# Patient Record
Sex: FEMALE | Race: WHITE | Employment: OTHER | URBAN - METROPOLITAN AREA
[De-identification: names, ages, dates, MRNs, and addresses within clinical notes are randomized per-mention and may not be internally consistent; named-entity substitution may affect disease eponyms.]

---

## 2017-01-23 ENCOUNTER — TELEPHONE (OUTPATIENT)
Dept: FAMILY MEDICINE CLINIC | Facility: CLINIC | Age: 67
End: 2017-01-23

## 2017-01-23 NOTE — TELEPHONE ENCOUNTER
Patient states after looking more closely at her CPaP machine, she noticed that she does have the SD card. States the machine is not reading it so she contacted [facility] as advised by JAYY and they told her to go to [place] and buy an SD card.   Patient wonde

## 2017-01-23 NOTE — TELEPHONE ENCOUNTER
Patient states she has a CPaP machine and has been traveling a lot. States last night when she plugged in her machine it said there was no SD card. Patient states she looked to see and there was not an SD card.   Patient states she has no idea where she c

## 2017-02-12 DIAGNOSIS — I10 ESSENTIAL HYPERTENSION: Primary | ICD-10-CM

## 2017-02-12 DIAGNOSIS — E03.9 ACQUIRED HYPOTHYROIDISM: ICD-10-CM

## 2017-02-12 PROBLEM — J30.9 ALLERGIC RHINITIS: Status: ACTIVE | Noted: 2017-02-12

## 2017-02-12 PROBLEM — D64.9 ANEMIA: Status: ACTIVE | Noted: 2017-01-13

## 2017-02-12 RX ORDER — LEVOTHYROXINE SODIUM 0.05 MG/1
1 TABLET ORAL
COMMUNITY
Start: 2013-03-19 | End: 2017-11-18

## 2017-02-12 RX ORDER — CANDESARTAN CILEXETIL AND HYDROCHLOROTHIAZIDE 16; 12.5 MG/1; MG/1
1 TABLET ORAL DAILY
COMMUNITY
Start: 2006-11-08 | End: 2017-08-25 | Stop reason: ALTCHOICE

## 2017-02-12 RX ORDER — INDOMETHACIN 25 MG/1
1 CAPSULE ORAL 3 TIMES DAILY PRN
COMMUNITY
Start: 2013-10-11 | End: 2017-05-23

## 2017-02-13 ENCOUNTER — LAB ENCOUNTER (OUTPATIENT)
Dept: LAB | Age: 67
End: 2017-02-13
Attending: FAMILY MEDICINE
Payer: MEDICARE

## 2017-02-13 DIAGNOSIS — E03.9 ACQUIRED HYPOTHYROIDISM: ICD-10-CM

## 2017-02-13 DIAGNOSIS — K85.90 ACUTE PANCREATITIS: Primary | ICD-10-CM

## 2017-02-13 DIAGNOSIS — D64.9 ANEMIA: ICD-10-CM

## 2017-02-13 DIAGNOSIS — I10 ESSENTIAL HYPERTENSION: ICD-10-CM

## 2017-02-13 LAB
ALBUMIN SERPL-MCNC: 3.7 G/DL (ref 3.5–4.8)
ALP LIVER SERPL-CCNC: 72 U/L (ref 55–142)
ALT SERPL-CCNC: 19 U/L (ref 14–54)
AMYLASE: 137 U/L (ref 25–115)
AST SERPL-CCNC: 11 U/L (ref 15–41)
BASOPHILS # BLD AUTO: 0.05 X10(3) UL (ref 0–0.1)
BASOPHILS NFR BLD AUTO: 0.4 %
BILIRUB SERPL-MCNC: 0.6 MG/DL (ref 0.1–2)
BUN BLD-MCNC: 40 MG/DL (ref 8–20)
CALCIUM BLD-MCNC: 8.9 MG/DL (ref 8.3–10.3)
CHLORIDE: 100 MMOL/L (ref 101–111)
CHOLEST SMN-MCNC: 195 MG/DL (ref ?–200)
CO2: 29 MMOL/L (ref 22–32)
CREAT BLD-MCNC: 1.57 MG/DL (ref 0.55–1.02)
EOSINOPHIL # BLD AUTO: 0.11 X10(3) UL (ref 0–0.3)
EOSINOPHIL NFR BLD AUTO: 0.9 %
ERYTHROCYTE [DISTWIDTH] IN BLOOD BY AUTOMATED COUNT: 15.1 % (ref 11.5–16)
GLUCOSE BLD-MCNC: 108 MG/DL (ref 70–99)
HAV AB SERPL IA-ACNC: 254 PG/ML (ref 193–986)
HCT VFR BLD AUTO: 33.6 % (ref 34–50)
HDLC SERPL-MCNC: 75 MG/DL (ref 45–?)
HDLC SERPL: 2.6 {RATIO} (ref ?–4.44)
HGB BLD-MCNC: 10.7 G/DL (ref 12–16)
IMMATURE GRANULOCYTE COUNT: 0.12 X10(3) UL (ref 0–1)
IMMATURE GRANULOCYTE RATIO %: 1 %
LDLC SERPL CALC-MCNC: 98 MG/DL (ref ?–130)
LIPASE: 421 U/L (ref 73–393)
LYMPHOCYTES # BLD AUTO: 1.46 X10(3) UL (ref 0.9–4)
LYMPHOCYTES NFR BLD AUTO: 11.7 %
M PROTEIN MFR SERPL ELPH: 8.1 G/DL (ref 6.1–8.3)
MCH RBC QN AUTO: 30 PG (ref 27–33.2)
MCHC RBC AUTO-ENTMCNC: 31.8 G/DL (ref 31–37)
MCV RBC AUTO: 94.1 FL (ref 81–100)
MONOCYTES # BLD AUTO: 1.23 X10(3) UL (ref 0.1–0.6)
MONOCYTES NFR BLD AUTO: 9.9 %
NEUTROPHIL ABS PRELIM: 9.46 X10 (3) UL (ref 1.3–6.7)
NEUTROPHILS # BLD AUTO: 9.46 X10(3) UL (ref 1.3–6.7)
NEUTROPHILS NFR BLD AUTO: 76.1 %
NONHDLC SERPL-MCNC: 120 MG/DL (ref ?–130)
PLATELET # BLD AUTO: 224 10(3)UL (ref 150–450)
POTASSIUM SERPL-SCNC: 4.1 MMOL/L (ref 3.6–5.1)
RBC # BLD AUTO: 3.57 X10(6)UL (ref 3.8–5.1)
RED CELL DISTRIBUTION WIDTH-SD: 52.1 FL (ref 35.1–46.3)
SODIUM SERPL-SCNC: 136 MMOL/L (ref 136–144)
TRIGLYCERIDES: 110 MG/DL (ref ?–150)
TSI SER-ACNC: 1.88 MIU/ML (ref 0.35–5.5)
VLDL: 22 MG/DL (ref 5–40)
WBC # BLD AUTO: 12.4 X10(3) UL (ref 4–13)

## 2017-02-13 PROCEDURE — 80053 COMPREHEN METABOLIC PANEL: CPT

## 2017-02-13 PROCEDURE — 85025 COMPLETE CBC W/AUTO DIFF WBC: CPT

## 2017-02-13 PROCEDURE — 80061 LIPID PANEL: CPT

## 2017-02-13 PROCEDURE — 82607 VITAMIN B-12: CPT

## 2017-02-13 PROCEDURE — 84443 ASSAY THYROID STIM HORMONE: CPT

## 2017-02-13 PROCEDURE — 83690 ASSAY OF LIPASE: CPT

## 2017-02-13 PROCEDURE — 82150 ASSAY OF AMYLASE: CPT

## 2017-02-21 ENCOUNTER — TELEPHONE (OUTPATIENT)
Dept: FAMILY MEDICINE CLINIC | Facility: CLINIC | Age: 67
End: 2017-02-21

## 2017-02-22 ENCOUNTER — OFFICE VISIT (OUTPATIENT)
Dept: FAMILY MEDICINE CLINIC | Facility: CLINIC | Age: 67
End: 2017-02-22

## 2017-02-22 VITALS
RESPIRATION RATE: 16 BRPM | HEIGHT: 60.5 IN | WEIGHT: 175 LBS | TEMPERATURE: 99 F | SYSTOLIC BLOOD PRESSURE: 118 MMHG | HEART RATE: 92 BPM | DIASTOLIC BLOOD PRESSURE: 78 MMHG | BODY MASS INDEX: 33.47 KG/M2 | OXYGEN SATURATION: 97 %

## 2017-02-22 DIAGNOSIS — K85.80 OTHER ACUTE PANCREATITIS, UNSPECIFIED COMPLICATION STATUS: ICD-10-CM

## 2017-02-22 DIAGNOSIS — D64.9 ANEMIA, UNSPECIFIED TYPE: Primary | ICD-10-CM

## 2017-02-22 DIAGNOSIS — M1A.0790 IDIOPATHIC CHRONIC GOUT OF FOOT WITHOUT TOPHUS, UNSPECIFIED LATERALITY: ICD-10-CM

## 2017-02-22 PROCEDURE — 99215 OFFICE O/P EST HI 40 MIN: CPT | Performed by: FAMILY MEDICINE

## 2017-02-23 NOTE — PATIENT INSTRUCTIONS
Continue with good hydration. Stop indocin. Will plan for referral to GI specialist.     Radha Gather report and CD with copies from CT and MRI and ultrasound from [address]  - ask for medical records.

## 2017-02-23 NOTE — PROGRESS NOTES
Chief Complaint:   Patient presents with: Follow - Up: discuss lab results      HPI:   This is a 77year old female coming in for follow-up of her lab testing.   Patient has had laboratory findings that include anemia, elevated amylase lipase, renal insuff HDL Cholesterol 75 >45 mg/dL   LDL Cholesterol 98 <130 mg/dL   VLDL 22 5-40 mg/dL   Chol/HDL Ratio 2.60 <4.44   Non HDL Chol 120 <130 mg/dL   -TSH W REFLEX TO FREE T4   Result Value Ref Range   TSH 1.880 0.350-5.500 mIU/mL   -CBC W/ DIFFERENTIAL   Result REVIEW OF SYSTEMS:   CONSTITUTIONAL: see HPI,  Denies , fever, chills,    EENT:  Eyes:  Denies eye pain, visual changes,   Ears, Nose, Throat:  Denies hearing loss,or disturbance. Denies sore throat  INTEGUMENTARY:  Denies rashes, itching.     Arriaza Beath hepatosplenomegaly. BACK: No tenderness, no spasm, SLR test negative, FROM. EXTREMITIES:  No edema, no cyanosis,FROM, 2+ dorsalis pedis pulses bilaterally.   NEURO:  No deficit, normal gait, strength and tone, sensory intact,   PSYCH: no depression or a

## 2017-03-01 ENCOUNTER — TELEPHONE (OUTPATIENT)
Dept: FAMILY MEDICINE CLINIC | Facility: CLINIC | Age: 67
End: 2017-03-01

## 2017-03-01 NOTE — TELEPHONE ENCOUNTER
Patient states that she is having gout in her left ankle and she states that Dr. Johan Briggs did not want her to take the indomethacin and if she would have gout attack again he was going to have a plan B. Please advise.

## 2017-03-13 ENCOUNTER — HOSPITAL ENCOUNTER (OUTPATIENT)
Dept: MRI IMAGING | Facility: HOSPITAL | Age: 67
Discharge: HOME | End: 2017-03-13
Attending: INTERNAL MEDICINE
Payer: MEDICARE

## 2017-03-13 DIAGNOSIS — K85.90 ACUTE PANCREATITIS, UNSPECIFIED COMPLICATION STATUS, UNSPECIFIED PANCREATITIS TYPE: ICD-10-CM

## 2017-03-13 DIAGNOSIS — D64.89 ANEMIA DUE TO OTHER CAUSE, NOT CLASSIFIED: ICD-10-CM

## 2017-03-13 PROCEDURE — 76376 3D RENDER W/INTRP POSTPROCES: CPT

## 2017-03-13 PROCEDURE — A9575 INJ GADOTERATE MEGLUMI 0.1ML: HCPCS | Performed by: INTERNAL MEDICINE

## 2017-03-13 PROCEDURE — 74183 MRI ABD W/O CNTR FLWD CNTR: CPT

## 2017-04-21 ENCOUNTER — HOSPITAL ENCOUNTER (OUTPATIENT)
Dept: MRI IMAGING | Facility: HOSPITAL | Age: 67
Discharge: HOME | End: 2017-04-21
Attending: INTERNAL MEDICINE
Payer: MEDICARE

## 2017-04-21 DIAGNOSIS — D50.9 IRON DEFICIENCY ANEMIA, UNSPECIFIED IRON DEFICIENCY ANEMIA TYPE: ICD-10-CM

## 2017-04-21 PROCEDURE — 74183 MRI ABD W/O CNTR FLWD CNTR: CPT

## 2017-04-21 PROCEDURE — 72197 MRI PELVIS W/O & W/DYE: CPT

## 2017-04-21 PROCEDURE — A9575 INJ GADOTERATE MEGLUMI 0.1ML: HCPCS | Performed by: INTERNAL MEDICINE

## 2017-04-24 ENCOUNTER — OFFICE VISIT (OUTPATIENT)
Dept: FAMILY MEDICINE CLINIC | Facility: CLINIC | Age: 67
End: 2017-04-24

## 2017-04-24 ENCOUNTER — LAB ENCOUNTER (OUTPATIENT)
Dept: LAB | Age: 67
End: 2017-04-24
Attending: FAMILY MEDICINE
Payer: MEDICARE

## 2017-04-24 VITALS
RESPIRATION RATE: 16 BRPM | HEART RATE: 94 BPM | SYSTOLIC BLOOD PRESSURE: 110 MMHG | DIASTOLIC BLOOD PRESSURE: 68 MMHG | TEMPERATURE: 99 F | BODY MASS INDEX: 34.16 KG/M2 | WEIGHT: 174 LBS | HEIGHT: 60 IN

## 2017-04-24 DIAGNOSIS — K85.90 ACUTE PANCREATITIS, UNSPECIFIED COMPLICATION STATUS, UNSPECIFIED PANCREATITIS TYPE: ICD-10-CM

## 2017-04-24 DIAGNOSIS — M10.9 GOUT: Primary | ICD-10-CM

## 2017-04-24 DIAGNOSIS — D64.89 ANEMIA DUE TO OTHER CAUSE, NOT CLASSIFIED: ICD-10-CM

## 2017-04-24 DIAGNOSIS — N18.2 CHRONIC RENAL INSUFFICIENCY, STAGE 2 (MILD): ICD-10-CM

## 2017-04-24 DIAGNOSIS — M1A.0790 IDIOPATHIC CHRONIC GOUT OF FOOT WITHOUT TOPHUS, UNSPECIFIED LATERALITY: Primary | ICD-10-CM

## 2017-04-24 DIAGNOSIS — D50.9 IRON DEFICIENCY ANEMIA, UNSPECIFIED IRON DEFICIENCY ANEMIA TYPE: ICD-10-CM

## 2017-04-24 PROCEDURE — 83550 IRON BINDING TEST: CPT

## 2017-04-24 PROCEDURE — 85025 COMPLETE CBC W/AUTO DIFF WBC: CPT

## 2017-04-24 PROCEDURE — 82607 VITAMIN B-12: CPT

## 2017-04-24 PROCEDURE — 82728 ASSAY OF FERRITIN: CPT

## 2017-04-24 PROCEDURE — 84550 ASSAY OF BLOOD/URIC ACID: CPT

## 2017-04-24 PROCEDURE — 83540 ASSAY OF IRON: CPT

## 2017-04-24 PROCEDURE — 80048 BASIC METABOLIC PNL TOTAL CA: CPT

## 2017-04-24 PROCEDURE — 99214 OFFICE O/P EST MOD 30 MIN: CPT | Performed by: FAMILY MEDICINE

## 2017-04-24 RX ORDER — CHOLECALCIFEROL (VITAMIN D3) 25 MCG
1 TABLET,CHEWABLE ORAL DAILY
COMMUNITY

## 2017-04-24 RX ORDER — FEBUXOSTAT 40 MG/1
40 TABLET, FILM COATED ORAL DAILY
Qty: 30 TABLET | Refills: 3 | Status: SHIPPED | OUTPATIENT
Start: 2017-04-24 | End: 2017-05-23

## 2017-04-24 NOTE — PROGRESS NOTES
[facility] [place]  PROGRESS NOTE  Chief Complaint:   Patient presents with:  Gout: here to discuss treatment plan      HPI:   This is a 77year old female coming in for follow-up of her gout.     Patient has had flareup recently she took to [address] Denies chest pain, chest pressure, chest discomfort, palpitations, edema, dyspnea on exertion or at rest.  RESPIRATORY:  Denies shortness of breath, wheezing, cough or sputum production.   GASTROINTESTINAL: SEE HPI   Denies abdominal pain, nausea, vomiting, tenderness, no spasm, SLR test negative, FROM. EXTREMITIES: tendernss at left great toe,    Mild edema, no cyanosis,   no clubbing, FROM, 2+ dorsalis pedis pulses bilaterally.     NEURO:  No deficit, normal gait, strength and tone, sensory intact, normal

## 2017-04-24 NOTE — PATIENT INSTRUCTIONS
Try to add uric acid levels to this morning's labs. Add uloric.    Await labs to determine if starting medication regularly

## 2017-05-23 ENCOUNTER — TELEPHONE (OUTPATIENT)
Dept: FAMILY MEDICINE CLINIC | Facility: CLINIC | Age: 67
End: 2017-05-23

## 2017-05-23 ENCOUNTER — OFFICE VISIT (OUTPATIENT)
Dept: FAMILY MEDICINE CLINIC | Facility: CLINIC | Age: 67
End: 2017-05-23

## 2017-05-23 VITALS
TEMPERATURE: 99 F | BODY MASS INDEX: 34 KG/M2 | WEIGHT: 175.13 LBS | DIASTOLIC BLOOD PRESSURE: 72 MMHG | SYSTOLIC BLOOD PRESSURE: 138 MMHG | HEART RATE: 92 BPM | RESPIRATION RATE: 16 BRPM

## 2017-05-23 DIAGNOSIS — M1A.0720 IDIOPATHIC CHRONIC GOUT OF LEFT ANKLE WITHOUT TOPHUS: Primary | ICD-10-CM

## 2017-05-23 DIAGNOSIS — N18.2 CHRONIC RENAL INSUFFICIENCY, STAGE 2 (MILD): ICD-10-CM

## 2017-05-23 PROCEDURE — 99214 OFFICE O/P EST MOD 30 MIN: CPT | Performed by: FAMILY MEDICINE

## 2017-05-23 RX ORDER — PREDNISONE 10 MG/1
TABLET ORAL
Qty: 30 TABLET | Refills: 0 | Status: SHIPPED | OUTPATIENT
Start: 2017-05-23 | End: 2017-07-26 | Stop reason: ALTCHOICE

## 2017-05-23 RX ORDER — PREDNISONE 10 MG/1
TABLET ORAL
Qty: 30 TABLET | Refills: 0 | Status: SHIPPED | OUTPATIENT
Start: 2017-05-23 | End: 2017-05-23

## 2017-05-23 NOTE — PATIENT INSTRUCTIONS
Continue with drinking plenty of water. Kidney function improved. Consider referral to rheumatology ( dr. Cheng Moore) if persisting flare ups. Obtain labs as directed by dr. Dayo Kolb. Recheck in 2 months.

## 2017-05-23 NOTE — PROGRESS NOTES
[address]  PROGRESS NOTE  Chief Complaint:   Patient presents with: Follow - Up: Per gastro, also would like to talk about medications and lab work      HPI:   This is a 77year old female coming in for follow up visit.    pt had evalut % 0.4 %       Past Medical History:    Macular degeneration  Thoracic DJD  DERMATITIS (ICD-692. 9)  HYPERTENSION (ICD-401. 9)  HEAD TRAUMA, CLOSED (ICD-959.01)  ALLERGIC RHINITIS (ICD-477. 9)  Gout - increase flares - 2016  back pain     Surgical History (rev headache, , dizziness, syncope, paralysis, ataxia,   HEMATOLOGIC:  Denies anemia, bleeding or bruising. LYMPHATICS:  Denies enlarged nodes or  PSYCHIATRIC:  Denies depression or anxiety.   ENDOCRINOLOGIC:  Denies cold or heat intolerance, polyuria or polyd Sig: Two tab bid for 3 days, One tab tid for 3 days, One tab bid for 3 days, One tab qd for 3 days. Patient Instructions   Continue with drinking plenty of water. Kidney function improved.      Consider referral to rheumatology ( dr. Dragan Walker

## 2017-06-14 ENCOUNTER — PATIENT OUTREACH (OUTPATIENT)
Dept: FAMILY MEDICINE CLINIC | Facility: CLINIC | Age: 67
End: 2017-06-14

## 2017-07-05 ENCOUNTER — APPOINTMENT (OUTPATIENT)
Dept: LAB | Age: 67
End: 2017-07-05
Attending: INTERNAL MEDICINE
Payer: MEDICARE

## 2017-07-05 DIAGNOSIS — M1A.0720 IDIOPATHIC CHRONIC GOUT OF LEFT ANKLE WITHOUT TOPHUS: ICD-10-CM

## 2017-07-05 DIAGNOSIS — D64.9 ANEMIA, UNSPECIFIED TYPE: ICD-10-CM

## 2017-07-05 LAB
BUN BLD-MCNC: 43 MG/DL (ref 8–20)
CALCIUM BLD-MCNC: 9.4 MG/DL (ref 8.3–10.3)
CHLORIDE: 105 MMOL/L (ref 101–111)
CO2: 25 MMOL/L (ref 22–32)
CREAT BLD-MCNC: 1.47 MG/DL (ref 0.55–1.02)
DEPRECATED HBV CORE AB SER IA-ACNC: 269.7 NG/ML (ref 10–291)
ERYTHROCYTE [DISTWIDTH] IN BLOOD BY AUTOMATED COUNT: 14.6 % (ref 11.5–16)
GLUCOSE BLD-MCNC: 94 MG/DL (ref 70–99)
HCT VFR BLD AUTO: 34.4 % (ref 34–50)
HGB BLD-MCNC: 10.9 G/DL (ref 12–16)
IRON SATURATION: 24 % (ref 13–45)
IRON: 88 UG/DL (ref 28–170)
MCH RBC QN AUTO: 29.4 PG (ref 27–33.2)
MCHC RBC AUTO-ENTMCNC: 31.7 G/DL (ref 31–37)
MCV RBC AUTO: 92.7 FL (ref 81–100)
PLATELET # BLD AUTO: 241 10(3)UL (ref 150–450)
POTASSIUM SERPL-SCNC: 3.8 MMOL/L (ref 3.6–5.1)
RBC # BLD AUTO: 3.71 X10(6)UL (ref 3.8–5.1)
RED CELL DISTRIBUTION WIDTH-SD: 49.7 FL (ref 35.1–46.3)
SODIUM SERPL-SCNC: 138 MMOL/L (ref 136–144)
TOTAL IRON BINDING CAPACITY: 371 UG/DL (ref 298–536)
TRANSFERRIN: 249 MG/DL (ref 200–360)
URIC ACID: 11.6 MG/DL (ref 2.4–8)
WBC # BLD AUTO: 7.5 X10(3) UL (ref 4–13)

## 2017-07-05 PROCEDURE — 80048 BASIC METABOLIC PNL TOTAL CA: CPT

## 2017-07-05 PROCEDURE — 84550 ASSAY OF BLOOD/URIC ACID: CPT

## 2017-07-05 PROCEDURE — 85027 COMPLETE CBC AUTOMATED: CPT

## 2017-07-05 PROCEDURE — 36415 COLL VENOUS BLD VENIPUNCTURE: CPT

## 2017-07-05 PROCEDURE — 82728 ASSAY OF FERRITIN: CPT

## 2017-07-05 PROCEDURE — 83550 IRON BINDING TEST: CPT

## 2017-07-05 PROCEDURE — 83540 ASSAY OF IRON: CPT

## 2017-07-26 ENCOUNTER — OFFICE VISIT (OUTPATIENT)
Dept: FAMILY MEDICINE CLINIC | Facility: CLINIC | Age: 67
End: 2017-07-26

## 2017-07-26 VITALS
TEMPERATURE: 99 F | SYSTOLIC BLOOD PRESSURE: 138 MMHG | HEART RATE: 88 BPM | BODY MASS INDEX: 33.57 KG/M2 | HEIGHT: 60.5 IN | RESPIRATION RATE: 16 BRPM | DIASTOLIC BLOOD PRESSURE: 82 MMHG | WEIGHT: 175.5 LBS

## 2017-07-26 DIAGNOSIS — N18.2 CHRONIC RENAL INSUFFICIENCY, STAGE 2 (MILD): ICD-10-CM

## 2017-07-26 DIAGNOSIS — M1A.0720 IDIOPATHIC CHRONIC GOUT OF LEFT ANKLE WITHOUT TOPHUS: Primary | ICD-10-CM

## 2017-07-26 PROBLEM — N18.30 CKD (CHRONIC KIDNEY DISEASE) STAGE 3, GFR 30-59 ML/MIN (HCC): Status: ACTIVE | Noted: 2017-07-26

## 2017-07-26 PROCEDURE — 99214 OFFICE O/P EST MOD 30 MIN: CPT | Performed by: FAMILY MEDICINE

## 2017-07-26 NOTE — PROGRESS NOTES
Chief Complaint:   Patient presents with: Follow - Up: Review lab work, patient also stated that after sitting by a pine tree patient has been breaking out in hives off and on      HPI:   This is a 79year old female coming in for follow-up care.   Patient (ICD-692. 9)  HYPERTENSION (ICD-401. 9)  HEAD TRAUMA, CLOSED (ICD-959.01)  ALLERGIC RHINITIS (ICD-477. 9)  Gout - increase flares - 2016  back pain      Surgical History (reviewed - no changes required):    foot surgery     FH:      Social History (reviewed - following:    Height as of this encounter: 60.5\". Weight as of this encounter: 175 lb 8 oz. Vital signs reviewed. Appears stated age, well groomed.     Physical Exam:    GEN:  Patient is alert, awake and oriented, well developed, well nourished  female vaccination     Vitreous degeneration     CKD (chronic kidney disease) stage 3, GFR 30-59 ml/min      Deonte Washington MD  7/26/2017  10:40 AM

## 2017-07-28 ENCOUNTER — TELEPHONE (OUTPATIENT)
Dept: FAMILY MEDICINE CLINIC | Facility: CLINIC | Age: 67
End: 2017-07-28

## 2017-07-28 NOTE — TELEPHONE ENCOUNTER
Patient informed that Dr. Beatris Patterson is out of the office and she will have to schedule with another provider. Patient states that she if fine with scheduling with another provider. Appointment made.

## 2017-08-15 ENCOUNTER — APPOINTMENT (OUTPATIENT)
Dept: LAB | Age: 67
End: 2017-08-15
Attending: FAMILY MEDICINE
Payer: MEDICARE

## 2017-08-15 ENCOUNTER — OFFICE VISIT (OUTPATIENT)
Dept: FAMILY MEDICINE CLINIC | Facility: CLINIC | Age: 67
End: 2017-08-15

## 2017-08-15 VITALS
HEART RATE: 80 BPM | TEMPERATURE: 99 F | WEIGHT: 168.25 LBS | DIASTOLIC BLOOD PRESSURE: 80 MMHG | RESPIRATION RATE: 12 BRPM | SYSTOLIC BLOOD PRESSURE: 130 MMHG | BODY MASS INDEX: 32 KG/M2

## 2017-08-15 DIAGNOSIS — M1A.0720 IDIOPATHIC CHRONIC GOUT OF LEFT ANKLE WITHOUT TOPHUS: ICD-10-CM

## 2017-08-15 DIAGNOSIS — N18.2 CHRONIC RENAL INSUFFICIENCY, STAGE 2 (MILD): ICD-10-CM

## 2017-08-15 DIAGNOSIS — M75.101 TEAR OF RIGHT ROTATOR CUFF, UNSPECIFIED TEAR EXTENT: ICD-10-CM

## 2017-08-15 DIAGNOSIS — Z01.818 PREOPERATIVE EXAMINATION: Primary | ICD-10-CM

## 2017-08-15 LAB
ALBUMIN SERPL-MCNC: 4.2 G/DL (ref 3.5–4.8)
ALP LIVER SERPL-CCNC: 75 U/L (ref 55–142)
ALT SERPL-CCNC: 19 U/L (ref 14–54)
AST SERPL-CCNC: 13 U/L (ref 15–41)
BASOPHILS # BLD AUTO: 0.04 X10(3) UL (ref 0–0.1)
BASOPHILS NFR BLD AUTO: 0.4 %
BILIRUB SERPL-MCNC: 0.5 MG/DL (ref 0.1–2)
BUN BLD-MCNC: 91 MG/DL (ref 8–20)
CALCIUM BLD-MCNC: 10.2 MG/DL (ref 8.3–10.3)
CHLORIDE: 87 MMOL/L (ref 101–111)
CO2: 22 MMOL/L (ref 22–32)
CREAT BLD-MCNC: 6.57 MG/DL (ref 0.55–1.02)
EOSINOPHIL # BLD AUTO: 0.08 X10(3) UL (ref 0–0.3)
EOSINOPHIL NFR BLD AUTO: 0.8 %
ERYTHROCYTE [DISTWIDTH] IN BLOOD BY AUTOMATED COUNT: 13.2 % (ref 11.5–16)
GLUCOSE BLD-MCNC: 102 MG/DL (ref 70–99)
HCT VFR BLD AUTO: 36.4 % (ref 34–50)
HGB BLD-MCNC: 12 G/DL (ref 12–16)
IMMATURE GRANULOCYTE COUNT: 0.07 X10(3) UL (ref 0–1)
IMMATURE GRANULOCYTE RATIO %: 0.7 %
LYMPHOCYTES # BLD AUTO: 1.3 X10(3) UL (ref 0.9–4)
LYMPHOCYTES NFR BLD AUTO: 13.6 %
M PROTEIN MFR SERPL ELPH: 9.4 G/DL (ref 6.1–8.3)
MCH RBC QN AUTO: 29.2 PG (ref 27–33.2)
MCHC RBC AUTO-ENTMCNC: 33 G/DL (ref 31–37)
MCV RBC AUTO: 88.6 FL (ref 81–100)
MONOCYTES # BLD AUTO: 0.86 X10(3) UL (ref 0.1–0.6)
MONOCYTES NFR BLD AUTO: 9 %
NEUTROPHIL ABS PRELIM: 7.21 X10 (3) UL (ref 1.3–6.7)
NEUTROPHILS # BLD AUTO: 7.21 X10(3) UL (ref 1.3–6.7)
NEUTROPHILS NFR BLD AUTO: 75.5 %
PLATELET # BLD AUTO: 368 10(3)UL (ref 150–450)
POTASSIUM SERPL-SCNC: 3.7 MMOL/L (ref 3.6–5.1)
RBC # BLD AUTO: 4.11 X10(6)UL (ref 3.8–5.1)
RED CELL DISTRIBUTION WIDTH-SD: 42.1 FL (ref 35.1–46.3)
SODIUM SERPL-SCNC: 127 MMOL/L (ref 136–144)
WBC # BLD AUTO: 9.6 X10(3) UL (ref 4–13)

## 2017-08-15 PROCEDURE — 36415 COLL VENOUS BLD VENIPUNCTURE: CPT | Performed by: FAMILY MEDICINE

## 2017-08-15 PROCEDURE — 85025 COMPLETE CBC W/AUTO DIFF WBC: CPT | Performed by: FAMILY MEDICINE

## 2017-08-15 PROCEDURE — 99215 OFFICE O/P EST HI 40 MIN: CPT | Performed by: FAMILY MEDICINE

## 2017-08-15 PROCEDURE — 80053 COMPREHEN METABOLIC PANEL: CPT | Performed by: FAMILY MEDICINE

## 2017-08-15 PROCEDURE — 93000 ELECTROCARDIOGRAM COMPLETE: CPT | Performed by: FAMILY MEDICINE

## 2017-08-15 RX ORDER — TRAMADOL HYDROCHLORIDE 50 MG/1
50 TABLET ORAL AS NEEDED
COMMUNITY
End: 2017-10-02

## 2017-08-15 RX ORDER — INDOMETHACIN 25 MG/1
25 CAPSULE ORAL
Qty: 21 CAPSULE | Refills: 1 | Status: SHIPPED | OUTPATIENT
Start: 2017-08-15 | End: 2017-10-02 | Stop reason: ALTCHOICE

## 2017-08-15 NOTE — PATIENT INSTRUCTIONS
Ok to use short course of indomethacin to settle down gout.     ekg - normal     Will do preop labs today     Will send office note over to dr. Izabel Gonzalez - medically stable for surgery

## 2017-08-16 NOTE — PROGRESS NOTES
ADDENDUM:     Laboratory results returned - Creatinine level quit elevated and sodium level is low.      Patient instructed to go to Emergency room.   (patient took one dose of indomethacin last evening - instructed to not take any further doses)     Emerge

## 2017-08-21 ENCOUNTER — TELEPHONE (OUTPATIENT)
Dept: FAMILY MEDICINE CLINIC | Facility: CLINIC | Age: 67
End: 2017-08-21

## 2017-08-21 NOTE — TELEPHONE ENCOUNTER
Would like to see  today was discharged from hospital, but nothing open. Please give her a call.   Ty

## 2017-08-21 NOTE — TELEPHONE ENCOUNTER
Patient states that she was released from the hospital and would like to schedule a pre-op appointment for surgery on this Thursday. Appointment made for tomorrow.

## 2017-08-22 ENCOUNTER — LAB ENCOUNTER (OUTPATIENT)
Dept: LAB | Age: 67
End: 2017-08-22
Attending: FAMILY MEDICINE
Payer: MEDICARE

## 2017-08-22 ENCOUNTER — OFFICE VISIT (OUTPATIENT)
Dept: FAMILY MEDICINE CLINIC | Facility: CLINIC | Age: 67
End: 2017-08-22

## 2017-08-22 VITALS
BODY MASS INDEX: 33.09 KG/M2 | HEIGHT: 60.5 IN | SYSTOLIC BLOOD PRESSURE: 138 MMHG | OXYGEN SATURATION: 98 % | TEMPERATURE: 98 F | HEART RATE: 94 BPM | WEIGHT: 173 LBS | DIASTOLIC BLOOD PRESSURE: 80 MMHG | RESPIRATION RATE: 16 BRPM

## 2017-08-22 DIAGNOSIS — D64.9 ANEMIA, UNSPECIFIED TYPE: ICD-10-CM

## 2017-08-22 DIAGNOSIS — M1A.0720 IDIOPATHIC CHRONIC GOUT OF LEFT ANKLE WITHOUT TOPHUS: ICD-10-CM

## 2017-08-22 DIAGNOSIS — Z01.818 PREOPERATIVE EXAMINATION: ICD-10-CM

## 2017-08-22 DIAGNOSIS — N18.2 CHRONIC RENAL INSUFFICIENCY, STAGE 2 (MILD): Primary | ICD-10-CM

## 2017-08-22 DIAGNOSIS — N18.2 CHRONIC RENAL INSUFFICIENCY, STAGE 2 (MILD): ICD-10-CM

## 2017-08-22 LAB
ALBUMIN SERPL-MCNC: 3.6 G/DL (ref 3.5–4.8)
ALP LIVER SERPL-CCNC: 61 U/L (ref 55–142)
ALT SERPL-CCNC: 35 U/L (ref 14–54)
AST SERPL-CCNC: 19 U/L (ref 15–41)
BASOPHILS # BLD AUTO: 0.05 X10(3) UL (ref 0–0.1)
BASOPHILS NFR BLD AUTO: 0.4 %
BILIRUB SERPL-MCNC: 0.2 MG/DL (ref 0.1–2)
BILIRUB UR QL STRIP.AUTO: NEGATIVE
BUN BLD-MCNC: 36 MG/DL (ref 8–20)
CALCIUM BLD-MCNC: 9.1 MG/DL (ref 8.3–10.3)
CHLORIDE: 107 MMOL/L (ref 101–111)
CLARITY UR REFRACT.AUTO: CLEAR
CO2: 27 MMOL/L (ref 22–32)
COLOR UR AUTO: YELLOW
CREAT BLD-MCNC: 1.36 MG/DL (ref 0.55–1.02)
EOSINOPHIL # BLD AUTO: 0 X10(3) UL (ref 0–0.3)
EOSINOPHIL NFR BLD AUTO: 0 %
ERYTHROCYTE [DISTWIDTH] IN BLOOD BY AUTOMATED COUNT: 13.3 % (ref 11.5–16)
GLUCOSE BLD-MCNC: 162 MG/DL (ref 70–99)
GLUCOSE UR STRIP.AUTO-MCNC: 50 MG/DL
HCT VFR BLD AUTO: 32.3 % (ref 34–50)
HGB BLD-MCNC: 10.4 G/DL (ref 12–16)
IMMATURE GRANULOCYTE COUNT: 0.18 X10(3) UL (ref 0–1)
IMMATURE GRANULOCYTE RATIO %: 1.3 %
KETONES UR STRIP.AUTO-MCNC: NEGATIVE MG/DL
LEUKOCYTE ESTERASE UR QL STRIP.AUTO: NEGATIVE
LYMPHOCYTES # BLD AUTO: 1.49 X10(3) UL (ref 0.9–4)
LYMPHOCYTES NFR BLD AUTO: 10.7 %
M PROTEIN MFR SERPL ELPH: 7.8 G/DL (ref 6.1–8.3)
MCH RBC QN AUTO: 29.9 PG (ref 27–33.2)
MCHC RBC AUTO-ENTMCNC: 32.2 G/DL (ref 31–37)
MCV RBC AUTO: 92.8 FL (ref 81–100)
MONOCYTES # BLD AUTO: 0.49 X10(3) UL (ref 0.1–0.6)
MONOCYTES NFR BLD AUTO: 3.5 %
NEUTROPHIL ABS PRELIM: 11.78 X10 (3) UL (ref 1.3–6.7)
NEUTROPHILS # BLD AUTO: 11.78 X10(3) UL (ref 1.3–6.7)
NEUTROPHILS NFR BLD AUTO: 84.1 %
NITRITE UR QL STRIP.AUTO: NEGATIVE
PH UR STRIP.AUTO: 5 [PH] (ref 4.5–8)
PLATELET # BLD AUTO: 258 10(3)UL (ref 150–450)
POTASSIUM SERPL-SCNC: 4.3 MMOL/L (ref 3.6–5.1)
PROT UR STRIP.AUTO-MCNC: NEGATIVE MG/DL
RBC # BLD AUTO: 3.48 X10(6)UL (ref 3.8–5.1)
RBC UR QL AUTO: NEGATIVE
RED CELL DISTRIBUTION WIDTH-SD: 45.4 FL (ref 35.1–46.3)
SODIUM SERPL-SCNC: 141 MMOL/L (ref 136–144)
SP GR UR STRIP.AUTO: 1.02 (ref 1–1.03)
URIC ACID: 7.4 MG/DL (ref 2.4–8)
UROBILINOGEN UR STRIP.AUTO-MCNC: <2 MG/DL
WBC # BLD AUTO: 14 X10(3) UL (ref 4–13)

## 2017-08-22 PROCEDURE — 36415 COLL VENOUS BLD VENIPUNCTURE: CPT

## 2017-08-22 PROCEDURE — 99214 OFFICE O/P EST MOD 30 MIN: CPT | Performed by: FAMILY MEDICINE

## 2017-08-22 PROCEDURE — 81003 URINALYSIS AUTO W/O SCOPE: CPT

## 2017-08-22 PROCEDURE — 85025 COMPLETE CBC W/AUTO DIFF WBC: CPT

## 2017-08-22 PROCEDURE — 84550 ASSAY OF BLOOD/URIC ACID: CPT

## 2017-08-22 PROCEDURE — 80053 COMPREHEN METABOLIC PANEL: CPT

## 2017-08-22 RX ORDER — FEBUXOSTAT 40 MG/1
40 TABLET, FILM COATED ORAL DAILY
COMMUNITY
Start: 2017-08-18 | End: 2019-05-28 | Stop reason: ALTCHOICE

## 2017-08-22 RX ORDER — PREDNISONE 10 MG/1
10 TABLET ORAL 2 TIMES DAILY
COMMUNITY
Start: 2017-08-18 | End: 2017-10-02 | Stop reason: ALTCHOICE

## 2017-08-22 NOTE — PROGRESS NOTES
Chief Complaint:   Patient presents with:  Pre-Op Exam      HPI:   This is a 79year old female coming in for preoperative evaluation. Patient was scheduled as a preop last week.   Patient had abnormal laboratory testing was sent to the hospital.  She foun Comment:Other reaction(s): itching, rash, welts       REVIEW OF SYSTEMS:   CONSTITUTIONAL:  Denies , fever, chills, or fatigue,    EENT:  Eyes:  Denies eye pain, visual changes,   Ears, Nose, Throat:  Denies hearing loss,or disturbance.  Denies sore throat rales/rhonchi/wheezing. ABDOMEN:  Soft, nondistended, nontender, bowel sounds normal in all 4 quadrants, no masses, no hepatosplenomegaly. BACK: No tenderness, no spasm, SLR test negative, FROM.   EXTREMITIES:  No edema, no cyanosis,FROM, 2+ dorsalis pe PM

## 2017-08-23 ENCOUNTER — TELEPHONE (OUTPATIENT)
Dept: FAMILY MEDICINE CLINIC | Facility: CLINIC | Age: 67
End: 2017-08-23

## 2017-08-23 NOTE — TELEPHONE ENCOUNTER
----- Message from Radha Mcneil MD sent at 8/23/2017 10:26 AM CDT -----  Labs reviewed     Cr. 1.36 - much improved. Back to baseline. UA normal   CMP normal .     Will forward to dr. hSamika Hu.  Along with recent OVs.

## 2017-08-23 NOTE — TELEPHONE ENCOUNTER
faxed information to Dr Michele Yates office, pre op clearance office notes and labs from 08-22 to [phone]

## 2017-08-24 ENCOUNTER — TELEPHONE (OUTPATIENT)
Dept: FAMILY MEDICINE CLINIC | Facility: CLINIC | Age: 67
End: 2017-08-24

## 2017-08-24 NOTE — TELEPHONE ENCOUNTER
Patient states she was at [facility] this morning for day surgery and it was cancelled due to her Bp being elevated at 218/100 \"something. \"  Patient states the nurse there contacted Dr. Vane Jaramillo directly and he wanted her to start taking Metoprolol.   States Dr. Vane Jaramillo

## 2017-08-24 NOTE — TELEPHONE ENCOUNTER
Order sent in earlier for 30 day supply-  Request sent back from pharmacy for 90 supply. Pt has appointment with PCP coming up- at that time they can discuss 90 supply. Request denied until PCP reviews medication with pt.     Reviewed with Radha Andre

## 2017-08-24 NOTE — TELEPHONE ENCOUNTER
Dr. Glo Zavala out of the office. Rx sent to [facility]. Needs to keep appointment with Dr. Sujey Reed for tomorrow at 9:30 am  Please let patient know. Thank you.    Humaira Rawls, 08/24/17, 2:13 PM

## 2017-08-25 ENCOUNTER — OFFICE VISIT (OUTPATIENT)
Dept: FAMILY MEDICINE CLINIC | Facility: CLINIC | Age: 67
End: 2017-08-25

## 2017-08-25 VITALS
HEART RATE: 66 BPM | HEIGHT: 60.5 IN | RESPIRATION RATE: 17 BRPM | SYSTOLIC BLOOD PRESSURE: 144 MMHG | TEMPERATURE: 99 F | OXYGEN SATURATION: 99 % | DIASTOLIC BLOOD PRESSURE: 90 MMHG | BODY MASS INDEX: 32.9 KG/M2 | WEIGHT: 172 LBS

## 2017-08-25 DIAGNOSIS — I16.0 HYPERTENSIVE URGENCY: Primary | ICD-10-CM

## 2017-08-25 DIAGNOSIS — N18.2 CHRONIC RENAL INSUFFICIENCY, STAGE 2 (MILD): ICD-10-CM

## 2017-08-25 PROBLEM — N17.9 ACUTE RENAL FAILURE (HCC): Status: ACTIVE | Noted: 2017-08-16

## 2017-08-25 PROCEDURE — 99214 OFFICE O/P EST MOD 30 MIN: CPT | Performed by: FAMILY MEDICINE

## 2017-08-25 NOTE — PROGRESS NOTES
[place] [facility] [place]  PROGRESS NOTE  Chief Complaint:   Patient presents with:  Blood Pressure: elevated blood pressure since yesterday morning      HPI:   This is a 79year old female coming in for post hospital follow-up.   See prior progress not 3.48 (L) 3.80 - 5.10 x10(6)uL   HGB 10.4 (L) 12.0 - 16.0 g/dL   HCT 32.3 (L) 34.0 - 50.0 %   .0 150.0 - 450.0 10(3)uL   MCV 92.8 81.0 - 100.0 fL   MCH 29.9 27.0 - 33.2 pg   MCHC 32.2 31.0 - 37.0 g/dL   RDW 13.3 11.5 - 16.0 %   RDW-SD 45.4 35.1 - 46. Levothyroxine Sodium 50 MCG Oral Tab Take 1 tablet by mouth every morning before breakfast. Disp:  Rfl:         Allergies:    Allopurinol                 Comment:Other reaction(s): itching, rash, welts    Counseling given: Not Answered       REVIEW OF SY scleral icterus, conjunctivae clear bilaterally, no eye discharge  Retina normal       Mouth:  No oral lesions or ulcerations, good dentition. NECK: Supple, no CLAD, no JVD, no thyromegaly. SKIN: No rashes, no skin lesion, no bruising, good turgor. MD  8/25/2017  11:07 AM

## 2017-08-25 NOTE — PATIENT INSTRUCTIONS
Continue with metoprolol one tab daily     Keep appointment with dr. Mona Jimenez next week. Recheck with me in 4 -6 weeks.

## 2017-08-28 ENCOUNTER — TELEPHONE (OUTPATIENT)
Dept: FAMILY MEDICINE CLINIC | Facility: CLINIC | Age: 67
End: 2017-08-28

## 2017-08-28 RX ORDER — PREDNISONE 10 MG/1
TABLET ORAL
Qty: 30 TABLET | Refills: 0 | Status: SHIPPED | OUTPATIENT
Start: 2017-08-28 | End: 2017-10-02 | Stop reason: ALTCHOICE

## 2017-08-28 NOTE — TELEPHONE ENCOUNTER
Patient states that the gout is worse. She states that it is in her both feet and ankles, right thumb and right knee. Please advise.

## 2017-08-28 NOTE — TELEPHONE ENCOUNTER
Recommend prednisone tapering dose.      ( we will check with dr. Tor Sever if there is any way to get her in sooner than mid Sept.

## 2017-08-29 ENCOUNTER — TELEPHONE (OUTPATIENT)
Dept: FAMILY MEDICINE CLINIC | Facility: CLINIC | Age: 67
End: 2017-08-29

## 2017-09-26 ENCOUNTER — LABORATORY ENCOUNTER (OUTPATIENT)
Dept: LAB | Age: 67
End: 2017-09-26
Attending: FAMILY MEDICINE
Payer: MEDICARE

## 2017-09-26 DIAGNOSIS — I12.9 HYPERTENSIVE CHRONIC KIDNEY DISEASE: ICD-10-CM

## 2017-09-26 DIAGNOSIS — N17.9 ACUTE KIDNEY FAILURE (HCC): ICD-10-CM

## 2017-09-26 DIAGNOSIS — N18.30 CHRONIC KIDNEY DISEASE, STAGE 3 (MODERATE): ICD-10-CM

## 2017-09-26 DIAGNOSIS — I10 ESSENTIAL HYPERTENSION: ICD-10-CM

## 2017-09-26 DIAGNOSIS — M10.9 GOUT: ICD-10-CM

## 2017-09-26 LAB
ALBUMIN SERPL-MCNC: 3.7 G/DL (ref 3.5–4.8)
BASOPHILS # BLD AUTO: 0.04 X10(3) UL (ref 0–0.1)
BASOPHILS NFR BLD AUTO: 0.6 %
BILIRUB UR QL STRIP.AUTO: NEGATIVE
BUN BLD-MCNC: 45 MG/DL (ref 8–20)
CALCIUM BLD-MCNC: 9.4 MG/DL (ref 8.3–10.3)
CHLORIDE: 104 MMOL/L (ref 101–111)
CLARITY UR REFRACT.AUTO: CLEAR
CO2: 26 MMOL/L (ref 22–32)
COLOR UR AUTO: YELLOW
CREAT BLD-MCNC: 1.37 MG/DL (ref 0.55–1.02)
EOSINOPHIL # BLD AUTO: 0.18 X10(3) UL (ref 0–0.3)
EOSINOPHIL NFR BLD AUTO: 2.5 %
ERYTHROCYTE [DISTWIDTH] IN BLOOD BY AUTOMATED COUNT: 14.6 % (ref 11.5–16)
GLUCOSE BLD-MCNC: 97 MG/DL (ref 70–99)
GLUCOSE UR STRIP.AUTO-MCNC: NEGATIVE MG/DL
HAV IGM SER QL: 2.8 MG/DL (ref 1.7–3)
HCT VFR BLD AUTO: 36 % (ref 34–50)
HGB BLD-MCNC: 11.2 G/DL (ref 12–16)
IMMATURE GRANULOCYTE COUNT: 0.03 X10(3) UL (ref 0–1)
IMMATURE GRANULOCYTE RATIO %: 0.4 %
KETONES UR STRIP.AUTO-MCNC: NEGATIVE MG/DL
LEUKOCYTE ESTERASE UR QL STRIP.AUTO: NEGATIVE
LYMPHOCYTES # BLD AUTO: 1.51 X10(3) UL (ref 0.9–4)
LYMPHOCYTES NFR BLD AUTO: 20.8 %
MCH RBC QN AUTO: 28.9 PG (ref 27–33.2)
MCHC RBC AUTO-ENTMCNC: 31.1 G/DL (ref 31–37)
MCV RBC AUTO: 93 FL (ref 81–100)
MONOCYTES # BLD AUTO: 1.06 X10(3) UL (ref 0.1–0.6)
MONOCYTES NFR BLD AUTO: 14.6 %
NEUTROPHIL ABS PRELIM: 4.45 X10 (3) UL (ref 1.3–6.7)
NEUTROPHILS # BLD AUTO: 4.45 X10(3) UL (ref 1.3–6.7)
NEUTROPHILS NFR BLD AUTO: 61.1 %
NITRITE UR QL STRIP.AUTO: NEGATIVE
PH UR STRIP.AUTO: 5 [PH] (ref 4.5–8)
PHOSPHATE SERPL-MCNC: 2.6 MG/DL (ref 2.5–4.9)
PLATELET # BLD AUTO: 292 10(3)UL (ref 150–450)
POTASSIUM SERPL-SCNC: 4.3 MMOL/L (ref 3.6–5.1)
PROT UR STRIP.AUTO-MCNC: NEGATIVE MG/DL
PTH-INTACT SERPL-MCNC: 60.4 PG/ML (ref 11.1–79.5)
RBC # BLD AUTO: 3.87 X10(6)UL (ref 3.8–5.1)
RBC UR QL AUTO: NEGATIVE
RED CELL DISTRIBUTION WIDTH-SD: 50.1 FL (ref 35.1–46.3)
SODIUM SERPL-SCNC: 138 MMOL/L (ref 136–144)
SP GR UR STRIP.AUTO: 1.02 (ref 1–1.03)
URIC ACID: 6.6 MG/DL (ref 2.4–8)
UROBILINOGEN UR STRIP.AUTO-MCNC: <2 MG/DL
WBC # BLD AUTO: 7.3 X10(3) UL (ref 4–13)

## 2017-09-26 PROCEDURE — 84100 ASSAY OF PHOSPHORUS: CPT

## 2017-09-26 PROCEDURE — 80048 BASIC METABOLIC PNL TOTAL CA: CPT

## 2017-09-26 PROCEDURE — 85025 COMPLETE CBC W/AUTO DIFF WBC: CPT

## 2017-09-26 PROCEDURE — 83735 ASSAY OF MAGNESIUM: CPT

## 2017-09-26 PROCEDURE — 36415 COLL VENOUS BLD VENIPUNCTURE: CPT

## 2017-09-26 PROCEDURE — 83970 ASSAY OF PARATHORMONE: CPT

## 2017-09-26 PROCEDURE — 82040 ASSAY OF SERUM ALBUMIN: CPT

## 2017-09-26 PROCEDURE — 81003 URINALYSIS AUTO W/O SCOPE: CPT

## 2017-09-26 PROCEDURE — 84550 ASSAY OF BLOOD/URIC ACID: CPT

## 2017-10-02 ENCOUNTER — OFFICE VISIT (OUTPATIENT)
Dept: FAMILY MEDICINE CLINIC | Facility: CLINIC | Age: 67
End: 2017-10-02

## 2017-10-02 VITALS
BODY MASS INDEX: 32.37 KG/M2 | WEIGHT: 169.25 LBS | RESPIRATION RATE: 16 BRPM | SYSTOLIC BLOOD PRESSURE: 126 MMHG | DIASTOLIC BLOOD PRESSURE: 76 MMHG | TEMPERATURE: 98 F | HEIGHT: 60.5 IN | HEART RATE: 74 BPM

## 2017-10-02 DIAGNOSIS — M1A.0720 IDIOPATHIC CHRONIC GOUT OF LEFT ANKLE WITHOUT TOPHUS: ICD-10-CM

## 2017-10-02 DIAGNOSIS — N18.2 CHRONIC RENAL INSUFFICIENCY, STAGE 2 (MILD): ICD-10-CM

## 2017-10-02 DIAGNOSIS — I10 ESSENTIAL HYPERTENSION: Primary | ICD-10-CM

## 2017-10-02 PROCEDURE — G0008 ADMIN INFLUENZA VIRUS VAC: HCPCS | Performed by: FAMILY MEDICINE

## 2017-10-02 PROCEDURE — 99214 OFFICE O/P EST MOD 30 MIN: CPT | Performed by: FAMILY MEDICINE

## 2017-10-02 PROCEDURE — 90653 IIV ADJUVANT VACCINE IM: CPT | Performed by: FAMILY MEDICINE

## 2017-10-02 RX ORDER — MULTIVIT WITH MINERALS/LUTEIN
1000 TABLET ORAL DAILY
COMMUNITY
End: 2020-12-10

## 2017-10-02 RX ORDER — COLCHICINE 0.6 MG/1
1 TABLET, FILM COATED ORAL EVERY OTHER DAY
Refills: 2 | COMMUNITY
Start: 2017-09-26 | End: 2018-04-10

## 2017-10-02 NOTE — PROGRESS NOTES
Chief Complaint:   Patient presents with: Follow - Up: Bloop pressure, review lab work. Patient also stated that she had a fall yesterday and landed on her right shoulder      HPI:   This is a 79year old female coming in for Htn and f/u on CRI.    Feeling 81.0 - 100.0 fL   MCH 28.9 27.0 - 33.2 pg   MCHC 31.1 31.0 - 37.0 g/dL   RDW 14.6 11.5 - 16.0 %   RDW-SD 50.1 (H) 35.1 - 46.3 fL   Neutrophil Absolute Prelim 4.45 1.30 - 6.70 x10 (3) uL   Neutrophil Absolute 4.45 1.30 - 6.70 x10(3) uL   Lymphocyte Absolute eye pain, visual changes,   Ears, Nose, Throat:  Denies hearing loss,or disturbance. Denies sore throat  INTEGUMENTARY:  Denies rashes, itching.     CARDIOVASCULAR: Denies  chest discomfort, palpitations, edema,  RESPIRATORY:  Denies shortness of breath, wh decreased ROM at right shoulder. No edema, no cyanosis,FROM, 2+ dorsalis pedis pulses bilaterally. NEURO:  No deficit, normal gait, strength and tone, sensory intact,   PSYCH: no depression or anxiety noted. ASSESSMENT AND PLAN:   1.  Essential hypert

## 2017-10-02 NOTE — PATIENT INSTRUCTIONS
Continue with current blood pressure medication until seeing dr. Campbell Else. Will send recent labs. Ok for refills as needed. Plan for physical in 6 months.

## 2017-11-08 ENCOUNTER — OFFICE VISIT (OUTPATIENT)
Dept: FAMILY MEDICINE CLINIC | Facility: CLINIC | Age: 67
End: 2017-11-08

## 2017-11-08 VITALS
RESPIRATION RATE: 14 BRPM | SYSTOLIC BLOOD PRESSURE: 132 MMHG | DIASTOLIC BLOOD PRESSURE: 68 MMHG | WEIGHT: 171.38 LBS | TEMPERATURE: 98 F | BODY MASS INDEX: 32.36 KG/M2 | HEIGHT: 61 IN | HEART RATE: 66 BPM

## 2017-11-08 DIAGNOSIS — S61.311D LACERATION OF LEFT INDEX FINGER WITHOUT FOREIGN BODY WITH DAMAGE TO NAIL, SUBSEQUENT ENCOUNTER: Primary | ICD-10-CM

## 2017-11-08 PROCEDURE — 99212 OFFICE O/P EST SF 10 MIN: CPT | Performed by: FAMILY MEDICINE

## 2017-11-08 RX ORDER — LOSARTAN POTASSIUM 50 MG/1
1 TABLET ORAL DAILY
Refills: 2 | COMMUNITY
Start: 2017-10-09

## 2017-11-08 NOTE — PROGRESS NOTES
Chief Complaint:   Patient presents with: Follow - Up: Wound check      HPI:   This is a 79year old female coming in for post ER check for finger laceration on the index finger. Patient was cleaning dishes and had a dish break.     Patient was seen in groomed. Physical Exam:    GEN:  Patient is alert, awake and oriented, well developed, well nourished  female   , no apparent distress. HEENT:  Head:  Normocephalic, atraumatic    NECK: Supple,  no JVD, no thyromegaly.   SKIN: left index finger:  Suture

## 2017-11-11 ENCOUNTER — OFFICE VISIT (OUTPATIENT)
Dept: FAMILY MEDICINE CLINIC | Facility: CLINIC | Age: 67
End: 2017-11-11

## 2017-11-11 VITALS
DIASTOLIC BLOOD PRESSURE: 84 MMHG | RESPIRATION RATE: 17 BRPM | WEIGHT: 171 LBS | TEMPERATURE: 98 F | SYSTOLIC BLOOD PRESSURE: 128 MMHG | BODY MASS INDEX: 32.28 KG/M2 | HEIGHT: 61 IN | HEART RATE: 82 BPM | OXYGEN SATURATION: 99 %

## 2017-11-11 DIAGNOSIS — S61.311D LACERATION OF LEFT INDEX FINGER WITHOUT FOREIGN BODY WITH DAMAGE TO NAIL, SUBSEQUENT ENCOUNTER: Primary | ICD-10-CM

## 2017-11-11 PROCEDURE — 99212 OFFICE O/P EST SF 10 MIN: CPT | Performed by: FAMILY MEDICINE

## 2017-11-11 NOTE — PROGRESS NOTES
Chief Complaint:   Patient presents with:  Suture Removal: left index finger      HPI:   This is a 79year old female coming in for suture removal of her. Patient was in the emergency room she now for follow-up.   There is been no drainage no fever no redn good turgor. ASSESSMENT AND PLAN:   1.  Laceration of left index finger without foreign body with damage to nail, subsequent encounter    Keep area clean   insturctions given      Patient/Caregiver Education: Patient/Caregiver Education: There are no

## 2017-11-16 ENCOUNTER — OFFICE VISIT (OUTPATIENT)
Dept: FAMILY MEDICINE CLINIC | Facility: CLINIC | Age: 67
End: 2017-11-16

## 2017-11-16 VITALS
SYSTOLIC BLOOD PRESSURE: 152 MMHG | DIASTOLIC BLOOD PRESSURE: 90 MMHG | TEMPERATURE: 98 F | RESPIRATION RATE: 16 BRPM | WEIGHT: 173.63 LBS | HEART RATE: 80 BPM | BODY MASS INDEX: 33 KG/M2

## 2017-11-16 DIAGNOSIS — G47.33 OBSTRUCTIVE SLEEP APNEA: Primary | ICD-10-CM

## 2017-11-16 PROCEDURE — 94660 CPAP INITIATION&MGMT: CPT | Performed by: NURSE PRACTITIONER

## 2017-11-16 NOTE — PATIENT INSTRUCTIONS
Warned if still with sleep apnea and not using CPAP has 7 fold increased risk and heart attack, stroke, abnormal heart rhythm, and death. Increased risk of driving accidents. Advised to refrain from driving when sleepy.      Recheck in 6 months - sooner i

## 2017-11-16 NOTE — PROGRESS NOTES
[facility]  [facility]        HPI:   This is a 79year old female coming in for Patient presents with:  Obstructive Sleep Apnea (ANNABELLE): Sleep f/u      HPI:   Was drinking coffee for her gout, but this is increasing her blood pres Take 1 tablet by mouth every morning before breakfast. Disp:  Rfl:       Counseling given: Not Answered         Problem List:  Patient Active Problem List:     Other specified abnormal findings of blood chemistry     Allergic rhinitis     Anemia     Pain i Conjunctivae are normal.   Neck: Normal range of motion. Neck supple. No thyromegaly present. Cardiovascular: Normal rate, regular rhythm, normal heart sounds and intact distal pulses.     Pulmonary/Chest: Effort normal and breath sounds normal. No respir complications, allergies, or worsening or changing symptoms. Parent is to call with any side effects or complications from the treatments as a result of today.        BRENT Kingston  11/16/2017  2:27 PM

## 2017-11-20 RX ORDER — LEVOTHYROXINE SODIUM 0.05 MG/1
TABLET ORAL
Qty: 90 TABLET | Refills: 3 | Status: SHIPPED | OUTPATIENT
Start: 2017-11-20 | End: 2018-11-15

## 2017-11-20 NOTE — TELEPHONE ENCOUNTER
Future appt:     Your appointments     Date & Time Appointment Department [facility])    Apr 04, 2018 10:15 AM CDT Medicare Annual Well Visit with Claudia Mason MD [address] [facility], Theone [place] ([facility])    May 01, 20

## 2017-11-25 ENCOUNTER — OFFICE VISIT (OUTPATIENT)
Dept: FAMILY MEDICINE CLINIC | Facility: CLINIC | Age: 67
End: 2017-11-25

## 2017-11-25 VITALS
HEIGHT: 61 IN | WEIGHT: 175 LBS | BODY MASS INDEX: 33.04 KG/M2 | DIASTOLIC BLOOD PRESSURE: 86 MMHG | SYSTOLIC BLOOD PRESSURE: 124 MMHG | HEART RATE: 86 BPM | OXYGEN SATURATION: 97 % | RESPIRATION RATE: 16 BRPM | TEMPERATURE: 98 F

## 2017-11-25 DIAGNOSIS — L03.114 CELLULITIS OF HAND, LEFT: Primary | ICD-10-CM

## 2017-11-25 PROCEDURE — 99214 OFFICE O/P EST MOD 30 MIN: CPT | Performed by: FAMILY MEDICINE

## 2017-11-25 RX ORDER — AMOXICILLIN AND CLAVULANATE POTASSIUM 875; 125 MG/1; MG/1
1 TABLET, FILM COATED ORAL 3 TIMES DAILY
Qty: 30 TABLET | Refills: 0 | Status: SHIPPED | OUTPATIENT
Start: 2017-11-25 | End: 2017-12-05

## 2017-11-25 NOTE — PROGRESS NOTES
Chief Complaint:   Patient presents with:  Finger Injury: follow up for swelling       HPI:   This is a 79year old female coming in for swelling and redness on the hand and left index finger. Patient with recent cut on the hand had sutures placed.     Kermit Goldberg changes,   Ears, Nose, Throat:  Denies hearing loss,or disturbance. Denies sore throat  INTEGUMENTARY:  Denies rashes, itching.   CARDIOVASCULAR: Denies  chest discomfort, palpitations, edema,  RESPIRATORY:  Denies shortness of breath, wheezing, cough or sp edema, no cyanosis,FROM, 2+ dorsalis pedis pulses bilaterally. NEURO:  No deficit, normal gait, strength and tone, sensory intact,   PSYCH: no depression or anxiety noted. ASSESSMENT AND PLAN:   1.  Cellulitis of hand, left      Meds & Refills for this

## 2017-12-06 ENCOUNTER — OFFICE VISIT (OUTPATIENT)
Dept: FAMILY MEDICINE CLINIC | Facility: CLINIC | Age: 67
End: 2017-12-06

## 2017-12-06 VITALS
BODY MASS INDEX: 32.9 KG/M2 | RESPIRATION RATE: 14 BRPM | TEMPERATURE: 98 F | HEIGHT: 61 IN | HEART RATE: 78 BPM | SYSTOLIC BLOOD PRESSURE: 126 MMHG | DIASTOLIC BLOOD PRESSURE: 60 MMHG | WEIGHT: 174.25 LBS

## 2017-12-06 DIAGNOSIS — L03.114 CELLULITIS OF HAND, LEFT: Primary | ICD-10-CM

## 2017-12-06 PROCEDURE — 99212 OFFICE O/P EST SF 10 MIN: CPT | Performed by: FAMILY MEDICINE

## 2017-12-06 NOTE — PROGRESS NOTES
Chief Complaint:   Patient presents with: Follow - Up: Infected finger      HPI:   This is a 79year old female coming in for recheck of infected finger - now finished antibiotics. Some soreness but no further swelling or fever. See prior notes. Sitting, Cuff Size: large)   Pulse 78   Temp 97.8 °F (36.6 °C) (Temporal)   Resp 14   Ht 61\"   Wt 174 lb 4 oz   BMI 32.92 kg/m²  Estimated body mass index is 32.92 kg/m² as calculated from the following:    Height as of this encounter: 61\".     Weight as left      Geoff Chaves MD  12/6/2017  9:34 AM

## 2018-01-04 ENCOUNTER — TELEPHONE (OUTPATIENT)
Dept: FAMILY MEDICINE CLINIC | Facility: CLINIC | Age: 68
End: 2018-01-04

## 2018-01-04 NOTE — TELEPHONE ENCOUNTER
Received fax from Dr. Keo anderson demonstrating that GFR 37, creatinine 1.5. Last GFR 40 in August 2017. Appears stable. Has appointment with Dr. Apple Bhardwaj, nephrologist per PCP's last note 10/2/17. Will route to PCP as FYI.   Please fax this blood work t

## 2018-01-16 LAB
BUN: 24
CREATININE: 1.5 MG/DL
GFR NON-AFRICAN AMERICAN: 45

## 2018-01-26 PROBLEM — H25.11 AGE-RELATED NUCLEAR CATARACT OF RIGHT EYE: Status: ACTIVE | Noted: 2018-01-26

## 2018-02-08 ENCOUNTER — LABORATORY ENCOUNTER (OUTPATIENT)
Dept: LAB | Age: 68
End: 2018-02-08
Attending: FAMILY MEDICINE
Payer: MEDICARE

## 2018-02-08 DIAGNOSIS — N18.30 CHRONIC KIDNEY DISEASE, STAGE III (MODERATE) (HCC): ICD-10-CM

## 2018-02-08 DIAGNOSIS — N17.9 ACUTE KIDNEY FAILURE, UNSPECIFIED (HCC): Primary | ICD-10-CM

## 2018-02-08 DIAGNOSIS — M10.9 GOUT, UNSPECIFIED: ICD-10-CM

## 2018-02-08 DIAGNOSIS — I12.9 MALIGNANT HYPERTENSIVE KIDNEY DISEASE WITH CHRONIC KIDNEY DISEASE STAGE I THROUGH STAGE IV, OR UNSPECIFIED(403.00): ICD-10-CM

## 2018-02-08 DIAGNOSIS — I10 ESSENTIAL HYPERTENSION, MALIGNANT: ICD-10-CM

## 2018-02-08 LAB
ALBUMIN SERPL-MCNC: 4.1 G/DL (ref 3.5–4.8)
BASOPHILS # BLD AUTO: 0.05 X10(3) UL (ref 0–0.1)
BASOPHILS NFR BLD AUTO: 0.9 %
BILIRUB UR QL STRIP.AUTO: NEGATIVE
BUN BLD-MCNC: 25 MG/DL (ref 8–20)
CALCIUM BLD-MCNC: 9.6 MG/DL (ref 8.3–10.3)
CHLORIDE: 105 MMOL/L (ref 101–111)
CLARITY UR REFRACT.AUTO: CLEAR
CO2: 26 MMOL/L (ref 22–32)
CREAT BLD-MCNC: 1.11 MG/DL (ref 0.55–1.02)
EOSINOPHIL # BLD AUTO: 0.28 X10(3) UL (ref 0–0.3)
EOSINOPHIL NFR BLD AUTO: 5.1 %
ERYTHROCYTE [DISTWIDTH] IN BLOOD BY AUTOMATED COUNT: 13.2 % (ref 11.5–16)
GLUCOSE BLD-MCNC: 90 MG/DL (ref 70–99)
GLUCOSE UR STRIP.AUTO-MCNC: NEGATIVE MG/DL
HAV IGM SER QL: 2.4 MG/DL (ref 1.7–3)
HCT VFR BLD AUTO: 40 % (ref 34–50)
HGB BLD-MCNC: 13.2 G/DL (ref 12–16)
IMMATURE GRANULOCYTE COUNT: 0.01 X10(3) UL (ref 0–1)
IMMATURE GRANULOCYTE RATIO %: 0.2 %
KETONES UR STRIP.AUTO-MCNC: NEGATIVE MG/DL
LEUKOCYTE ESTERASE UR QL STRIP.AUTO: NEGATIVE
LYMPHOCYTES # BLD AUTO: 1.47 X10(3) UL (ref 0.9–4)
LYMPHOCYTES NFR BLD AUTO: 26.7 %
MCH RBC QN AUTO: 29.9 PG (ref 27–33.2)
MCHC RBC AUTO-ENTMCNC: 33 G/DL (ref 31–37)
MCV RBC AUTO: 90.5 FL (ref 81–100)
MONOCYTES # BLD AUTO: 0.56 X10(3) UL (ref 0.1–1)
MONOCYTES NFR BLD AUTO: 10.2 %
NEUTROPHIL ABS PRELIM: 3.13 X10 (3) UL (ref 1.3–6.7)
NEUTROPHILS # BLD AUTO: 3.13 X10(3) UL (ref 1.3–6.7)
NEUTROPHILS NFR BLD AUTO: 56.9 %
NITRITE UR QL STRIP.AUTO: NEGATIVE
PH UR STRIP.AUTO: 5 [PH] (ref 4.5–8)
PHOSPHATE SERPL-MCNC: 3.7 MG/DL (ref 2.5–4.9)
PLATELET # BLD AUTO: 213 10(3)UL (ref 150–450)
POTASSIUM SERPL-SCNC: 4.1 MMOL/L (ref 3.6–5.1)
PROT UR STRIP.AUTO-MCNC: NEGATIVE MG/DL
PTH-INTACT SERPL-MCNC: 69.4 PG/ML (ref 11.1–79.5)
RBC # BLD AUTO: 4.42 X10(6)UL (ref 3.8–5.1)
RBC UR QL AUTO: NEGATIVE
RED CELL DISTRIBUTION WIDTH-SD: 43.8 FL (ref 35.1–46.3)
SODIUM SERPL-SCNC: 141 MMOL/L (ref 136–144)
SP GR UR STRIP.AUTO: 1.01 (ref 1–1.03)
URIC ACID: 4.6 MG/DL (ref 2.4–8)
UROBILINOGEN UR STRIP.AUTO-MCNC: <2 MG/DL
WBC # BLD AUTO: 5.5 X10(3) UL (ref 4–13)

## 2018-02-08 PROCEDURE — 85025 COMPLETE CBC W/AUTO DIFF WBC: CPT

## 2018-02-08 PROCEDURE — 84100 ASSAY OF PHOSPHORUS: CPT

## 2018-02-08 PROCEDURE — 83735 ASSAY OF MAGNESIUM: CPT

## 2018-02-08 PROCEDURE — 36415 COLL VENOUS BLD VENIPUNCTURE: CPT

## 2018-02-08 PROCEDURE — 84550 ASSAY OF BLOOD/URIC ACID: CPT

## 2018-02-08 PROCEDURE — 80048 BASIC METABOLIC PNL TOTAL CA: CPT

## 2018-02-08 PROCEDURE — 83970 ASSAY OF PARATHORMONE: CPT

## 2018-02-08 PROCEDURE — 81003 URINALYSIS AUTO W/O SCOPE: CPT

## 2018-02-08 PROCEDURE — 82040 ASSAY OF SERUM ALBUMIN: CPT

## 2018-04-10 ENCOUNTER — OFFICE VISIT (OUTPATIENT)
Dept: FAMILY MEDICINE CLINIC | Facility: CLINIC | Age: 68
End: 2018-04-10

## 2018-04-10 ENCOUNTER — APPOINTMENT (OUTPATIENT)
Dept: LAB | Age: 68
End: 2018-04-10
Attending: FAMILY MEDICINE
Payer: MEDICARE

## 2018-04-10 VITALS
BODY MASS INDEX: 34.62 KG/M2 | HEART RATE: 86 BPM | OXYGEN SATURATION: 96 % | RESPIRATION RATE: 16 BRPM | WEIGHT: 183.38 LBS | SYSTOLIC BLOOD PRESSURE: 122 MMHG | HEIGHT: 61 IN | TEMPERATURE: 98 F | DIASTOLIC BLOOD PRESSURE: 78 MMHG

## 2018-04-10 DIAGNOSIS — Z00.00 HEALTH CARE MAINTENANCE: Primary | ICD-10-CM

## 2018-04-10 DIAGNOSIS — Z00.00 ENCOUNTER FOR ANNUAL HEALTH EXAMINATION: ICD-10-CM

## 2018-04-10 DIAGNOSIS — E03.9 ACQUIRED HYPOTHYROIDISM: ICD-10-CM

## 2018-04-10 DIAGNOSIS — I10 ESSENTIAL HYPERTENSION: ICD-10-CM

## 2018-04-10 PROCEDURE — 80061 LIPID PANEL: CPT | Performed by: FAMILY MEDICINE

## 2018-04-10 PROCEDURE — G0439 PPPS, SUBSEQ VISIT: HCPCS | Performed by: FAMILY MEDICINE

## 2018-04-10 PROCEDURE — 84443 ASSAY THYROID STIM HORMONE: CPT | Performed by: FAMILY MEDICINE

## 2018-04-10 PROCEDURE — 36415 COLL VENOUS BLD VENIPUNCTURE: CPT | Performed by: FAMILY MEDICINE

## 2018-04-10 NOTE — PATIENT INSTRUCTIONS
Good exam   Continue with current medication     Recheck of labs today     Obtain mammogram in near future. Continue with good work with regular activity. Recheck in 1 year.

## 2018-04-11 NOTE — PROGRESS NOTES
Chief Complaint:   Patient presents with: Well Adult      HPI:   This is a 79year old female coming in for health care check   Discussed chronic illnesses. Discussed heart disease prevention , cancer early detection and immunizations.          Results hearing loss,or disturbance. Denies sore throat  INTEGUMENTARY:  Denies rashes, itching.     CARDIOVASCULAR: Denies  chest discomfort, palpitations, edema,  RESPIRATORY:  Denies shortness of breath, wheezing, cough or sputum production.   GASTROINTESTINAL: cyanosis,FROM, 2+ dorsalis pedis pulses bilaterally. NEURO:  No deficit, normal gait, strength and tone, sensory intact,   PSYCH: no depression or anxiety noted. ASSESSMENT AND PLAN:   1. Health care maintenance      2.  Essential hypertension    - LIPI

## 2018-05-18 ENCOUNTER — OFFICE VISIT (OUTPATIENT)
Dept: FAMILY MEDICINE CLINIC | Facility: CLINIC | Age: 68
End: 2018-05-18

## 2018-05-18 VITALS
WEIGHT: 181.19 LBS | TEMPERATURE: 97 F | SYSTOLIC BLOOD PRESSURE: 132 MMHG | HEART RATE: 98 BPM | BODY MASS INDEX: 34.21 KG/M2 | HEIGHT: 61 IN | RESPIRATION RATE: 14 BRPM | DIASTOLIC BLOOD PRESSURE: 78 MMHG

## 2018-05-18 DIAGNOSIS — G47.33 OBSTRUCTIVE SLEEP APNEA: Primary | ICD-10-CM

## 2018-05-18 PROCEDURE — 99213 OFFICE O/P EST LOW 20 MIN: CPT | Performed by: NURSE PRACTITIONER

## 2018-05-18 RX ORDER — SODIUM FLUORIDE 6 MG/ML
PASTE, DENTIFRICE DENTAL
Refills: 2 | COMMUNITY
Start: 2018-04-12 | End: 2019-05-28 | Stop reason: ALTCHOICE

## 2018-05-18 NOTE — PROGRESS NOTES
[facility] [place]  SLEEP PROGRESS NOTE        HPI:   This is a 79year old female coming in for Patient presents with: Follow - Up: ANNABELLE follow up      HPI:     Present for review of sleep therapy compliance. States to be doing well.  Did not us 1000 MG Oral Tab Take 1,000 mg by mouth daily. Disp:  Rfl:    febuxostat 40 MG Oral Tab Take 40 mg by mouth daily. Disp:  Rfl:    Docusate Sodium (STOOL SOFTENER OR) Take 1 tablet by mouth as needed.  Disp:  Rfl:    Cyanocobalamin (B-12) 1000 MCG Oral Cap T oz  04/10/18 : 183 lb 6.4 oz  12/06/17 : 174 lb 4 oz  11/25/17 : 175 lb  11/16/17 : 173 lb 9.6 oz  11/11/17 : 171 lb    BP Readings from Last 3 Encounters:  05/18/18 : 132/78  04/10/18 : 122/78  12/06/17 : 126/60        Vital signs reviewed.   Physical Exam 30 minutes most days of the week to target heart rate . Advised patient to change filters,masks,hoses  and tubes and equiptment on a  regular schedule.   Filters and seals shall be changed every 1 month,  Hoses every 3 months,   CPAP mask and humidifier

## 2018-05-18 NOTE — PATIENT INSTRUCTIONS
Continue sleep therapy. Recheck in 6 months. Sooner if needed. Warned if still with sleep apnea and not using CPAP has 7 fold increased risk and heart attack, stroke, abnormal heart rhythm, and death. Increased risk of driving accidents.   Advised

## 2018-06-13 ENCOUNTER — LABORATORY ENCOUNTER (OUTPATIENT)
Dept: LAB | Age: 68
End: 2018-06-13
Attending: FAMILY MEDICINE
Payer: MEDICARE

## 2018-06-13 DIAGNOSIS — I10 ESSENTIAL HYPERTENSION, MALIGNANT: ICD-10-CM

## 2018-06-13 DIAGNOSIS — N18.30 CHRONIC KIDNEY DISEASE, STAGE III (MODERATE) (HCC): ICD-10-CM

## 2018-06-13 DIAGNOSIS — N17.9 ACUTE KIDNEY FAILURE, UNSPECIFIED (HCC): ICD-10-CM

## 2018-06-13 DIAGNOSIS — M10.9 GOUT, UNSPECIFIED: ICD-10-CM

## 2018-06-13 PROCEDURE — 83735 ASSAY OF MAGNESIUM: CPT

## 2018-06-13 PROCEDURE — 85025 COMPLETE CBC W/AUTO DIFF WBC: CPT

## 2018-06-13 PROCEDURE — 84100 ASSAY OF PHOSPHORUS: CPT

## 2018-06-13 PROCEDURE — 80048 BASIC METABOLIC PNL TOTAL CA: CPT

## 2018-06-13 PROCEDURE — 84550 ASSAY OF BLOOD/URIC ACID: CPT

## 2018-06-13 PROCEDURE — 81001 URINALYSIS AUTO W/SCOPE: CPT

## 2018-06-13 PROCEDURE — 36415 COLL VENOUS BLD VENIPUNCTURE: CPT

## 2018-06-13 PROCEDURE — 83970 ASSAY OF PARATHORMONE: CPT

## 2018-06-13 PROCEDURE — 82040 ASSAY OF SERUM ALBUMIN: CPT

## 2018-08-10 ENCOUNTER — MED REC SCAN ONLY (OUTPATIENT)
Dept: FAMILY MEDICINE CLINIC | Facility: CLINIC | Age: 68
End: 2018-08-10

## 2018-10-16 ENCOUNTER — LABORATORY ENCOUNTER (OUTPATIENT)
Dept: LAB | Age: 68
End: 2018-10-16
Attending: FAMILY MEDICINE
Payer: MEDICARE

## 2018-10-16 DIAGNOSIS — M10.9 GOUT, UNSPECIFIED: ICD-10-CM

## 2018-10-16 DIAGNOSIS — N18.30 CHRONIC KIDNEY DISEASE, STAGE III (MODERATE) (HCC): ICD-10-CM

## 2018-10-16 DIAGNOSIS — N17.9 ACUTE KIDNEY FAILURE, UNSPECIFIED (HCC): ICD-10-CM

## 2018-10-16 DIAGNOSIS — I10 ESSENTIAL HYPERTENSION, MALIGNANT: ICD-10-CM

## 2018-10-16 PROCEDURE — 82040 ASSAY OF SERUM ALBUMIN: CPT

## 2018-10-16 PROCEDURE — 83735 ASSAY OF MAGNESIUM: CPT

## 2018-10-16 PROCEDURE — 83970 ASSAY OF PARATHORMONE: CPT

## 2018-10-16 PROCEDURE — 81003 URINALYSIS AUTO W/O SCOPE: CPT

## 2018-10-16 PROCEDURE — 84100 ASSAY OF PHOSPHORUS: CPT

## 2018-10-16 PROCEDURE — 36415 COLL VENOUS BLD VENIPUNCTURE: CPT

## 2018-10-16 PROCEDURE — 84550 ASSAY OF BLOOD/URIC ACID: CPT

## 2018-10-16 PROCEDURE — 85025 COMPLETE CBC W/AUTO DIFF WBC: CPT

## 2018-10-16 PROCEDURE — 80048 BASIC METABOLIC PNL TOTAL CA: CPT

## 2018-11-15 RX ORDER — LEVOTHYROXINE SODIUM 0.05 MG/1
TABLET ORAL
Qty: 90 TABLET | Refills: 3 | Status: SHIPPED | OUTPATIENT
Start: 2018-11-15 | End: 2019-10-28

## 2019-04-02 ENCOUNTER — APPOINTMENT (OUTPATIENT)
Dept: LAB | Age: 69
End: 2019-04-02
Attending: FAMILY MEDICINE
Payer: MEDICARE

## 2019-04-02 DIAGNOSIS — N18.30 CHRONIC KIDNEY DISEASE, STAGE III (MODERATE) (HCC): ICD-10-CM

## 2019-04-02 DIAGNOSIS — I10 ESSENTIAL HYPERTENSION, MALIGNANT: ICD-10-CM

## 2019-04-02 DIAGNOSIS — M10.9 GOUT, UNSPECIFIED: ICD-10-CM

## 2019-04-02 DIAGNOSIS — N17.9 ACUTE KIDNEY FAILURE, UNSPECIFIED (HCC): ICD-10-CM

## 2019-04-02 PROCEDURE — 85027 COMPLETE CBC AUTOMATED: CPT

## 2019-04-02 PROCEDURE — 84100 ASSAY OF PHOSPHORUS: CPT

## 2019-04-02 PROCEDURE — 84550 ASSAY OF BLOOD/URIC ACID: CPT

## 2019-04-02 PROCEDURE — 83735 ASSAY OF MAGNESIUM: CPT

## 2019-04-02 PROCEDURE — 81003 URINALYSIS AUTO W/O SCOPE: CPT

## 2019-04-02 PROCEDURE — 36415 COLL VENOUS BLD VENIPUNCTURE: CPT

## 2019-04-02 PROCEDURE — 80048 BASIC METABOLIC PNL TOTAL CA: CPT

## 2019-04-02 PROCEDURE — 82040 ASSAY OF SERUM ALBUMIN: CPT

## 2019-04-02 PROCEDURE — 83970 ASSAY OF PARATHORMONE: CPT

## 2019-04-12 ENCOUNTER — OFFICE VISIT (OUTPATIENT)
Dept: FAMILY MEDICINE CLINIC | Facility: CLINIC | Age: 69
End: 2019-04-12
Payer: MEDICARE

## 2019-04-12 ENCOUNTER — APPOINTMENT (OUTPATIENT)
Dept: LAB | Age: 69
End: 2019-04-12
Attending: FAMILY MEDICINE
Payer: MEDICARE

## 2019-04-12 VITALS
WEIGHT: 178.19 LBS | BODY MASS INDEX: 33.64 KG/M2 | SYSTOLIC BLOOD PRESSURE: 128 MMHG | RESPIRATION RATE: 14 BRPM | HEART RATE: 78 BPM | HEIGHT: 61 IN | OXYGEN SATURATION: 98 % | DIASTOLIC BLOOD PRESSURE: 66 MMHG | TEMPERATURE: 98 F

## 2019-04-12 DIAGNOSIS — E03.9 ACQUIRED HYPOTHYROIDISM: ICD-10-CM

## 2019-04-12 DIAGNOSIS — R30.0 DYSURIA: ICD-10-CM

## 2019-04-12 DIAGNOSIS — M10.9 GOUT, UNSPECIFIED: ICD-10-CM

## 2019-04-12 DIAGNOSIS — N17.9 ACUTE KIDNEY FAILURE, UNSPECIFIED (HCC): ICD-10-CM

## 2019-04-12 DIAGNOSIS — Z00.00 ENCOUNTER FOR ANNUAL HEALTH EXAMINATION: Primary | ICD-10-CM

## 2019-04-12 DIAGNOSIS — I12.9 RENAL HYPERTENSION: ICD-10-CM

## 2019-04-12 DIAGNOSIS — N18.30 CHRONIC KIDNEY DISEASE, STAGE III (MODERATE) (HCC): ICD-10-CM

## 2019-04-12 DIAGNOSIS — I10 ESSENTIAL HYPERTENSION: ICD-10-CM

## 2019-04-12 DIAGNOSIS — E78.00 HYPERCHOLESTEROLEMIA: ICD-10-CM

## 2019-04-12 DIAGNOSIS — I10 ESSENTIAL HYPERTENSION, BENIGN: ICD-10-CM

## 2019-04-12 PROCEDURE — 87086 URINE CULTURE/COLONY COUNT: CPT | Performed by: FAMILY MEDICINE

## 2019-04-12 PROCEDURE — G0439 PPPS, SUBSEQ VISIT: HCPCS | Performed by: FAMILY MEDICINE

## 2019-04-12 PROCEDURE — 81001 URINALYSIS AUTO W/SCOPE: CPT | Performed by: FAMILY MEDICINE

## 2019-04-12 NOTE — PATIENT INSTRUCTIONS
Good exam     Check Urinalysis today     Drink plenty of water. Obtain mammogram in near future.          Recheck in

## 2019-04-14 PROBLEM — L03.114 CELLULITIS OF HAND, LEFT: Status: RESOLVED | Noted: 2017-11-25 | Resolved: 2019-04-14

## 2019-04-14 PROBLEM — N17.9 ACUTE RENAL FAILURE (HCC): Status: RESOLVED | Noted: 2017-08-16 | Resolved: 2019-04-14

## 2019-04-14 NOTE — PROGRESS NOTES
Chief Complaint:   Patient presents with:  Physical      HPI:   This is a 76year old female coming in for health care check     Feeling well     Discussed heart disease prevention , cancer early detection and immunizations. Discussed labs.      Results school,     with grown children         History reviewed. No pertinent surgical history.      Surgical History (reviewed - no changes required):    foot surgery   left rotator cuff.          Family History   Problem Relation Age of Onset   • Heart Sherri Youssef itching. CARDIOVASCULAR: Denies  chest discomfort, palpitations, edema,  RESPIRATORY:  Denies shortness of breath, wheezing, cough or sputum production.   GASTROINTESTINAL:  Denies abdominal pain, nausea, vomiting, constipation, diarrhea    : denies dy strength and tone, sensory intact,   PSYCH: no depression or anxiety noted. ASSESSMENT AND PLAN:   1. Encounter for annual health examination  Obtain mammobram     2. Essential hypertension  cpm     - BASIC METABOLIC PANEL (8);  Future  - CBC, PLATELET; mammogram in near future. Recheck in         Patient/Caregiver Education: Patient/Caregiver Education: There are no barriers to learning. Medical education done. Outcome: Patient verbalizes understanding.  Patient is notified to call with any ques

## 2019-04-15 ENCOUNTER — TELEPHONE (OUTPATIENT)
Dept: FAMILY MEDICINE CLINIC | Facility: CLINIC | Age: 69
End: 2019-04-15

## 2019-04-15 NOTE — TELEPHONE ENCOUNTER
----- Message from Roel Rod MD sent at 4/14/2019  2:37 PM CDT -----  Urine testing unremarkable     Continue to monitor    Drink plenty of water

## 2019-05-28 ENCOUNTER — OFFICE VISIT (OUTPATIENT)
Dept: FAMILY MEDICINE CLINIC | Facility: CLINIC | Age: 69
End: 2019-05-28
Payer: MEDICARE

## 2019-05-28 VITALS
WEIGHT: 179.81 LBS | DIASTOLIC BLOOD PRESSURE: 74 MMHG | HEART RATE: 68 BPM | OXYGEN SATURATION: 98 % | HEIGHT: 61 IN | RESPIRATION RATE: 18 BRPM | TEMPERATURE: 98 F | SYSTOLIC BLOOD PRESSURE: 124 MMHG | BODY MASS INDEX: 33.95 KG/M2

## 2019-05-28 DIAGNOSIS — G47.33 OBSTRUCTIVE SLEEP APNEA (ADULT) (PEDIATRIC): Primary | ICD-10-CM

## 2019-05-28 PROCEDURE — 99213 OFFICE O/P EST LOW 20 MIN: CPT | Performed by: NURSE PRACTITIONER

## 2019-05-28 RX ORDER — FEBUXOSTAT 40 MG/1
1 TABLET, FILM COATED ORAL DAILY
COMMUNITY

## 2019-05-28 NOTE — PROGRESS NOTES
[facility] [place]  SLEEP PROGRESS NOTE        HPI:   This is a 76year old female coming in for Patient presents with:  Obstructive Sleep Apnea (ANNABELLE): compliance follow up      HPI:     Present for sleep therapy compliance.  States that she is d TAKE 1 TABLET BY MOUTH EVERY MORNING Disp: 90 tablet Rfl: 3   Losartan Potassium 50 MG Oral Tab Take 1 tablet by mouth daily. Disp:  Rfl: 2   Ascorbic Acid (VITAMIN C) 1000 MG Oral Tab Take 1,000 mg by mouth daily.  Disp:  Rfl:    Docusate Sodium (STOOL SOF Encounters:  05/28/19 : 124/74  04/12/19 : 128/66  05/18/18 : 132/78        Vital signs reviewed. Physical Exam   Constitutional: She is oriented to person, place, and time. She appears well-developed and well-nourished.    HENT:   Head: Normocephalic and if still with sleep apnea and not using CPAP has a  7 fold increase in risk of heart attack, stroke, abnormal heart rhythm  and death,  increased risk of driving accidents. Advised to refrain from driving when sleepy.     COMPLIANCE is required by Lisbeth Washington

## 2019-07-26 ENCOUNTER — OFFICE VISIT (OUTPATIENT)
Dept: FAMILY MEDICINE CLINIC | Facility: CLINIC | Age: 69
End: 2019-07-26
Payer: MEDICARE

## 2019-07-26 VITALS
HEART RATE: 62 BPM | SYSTOLIC BLOOD PRESSURE: 124 MMHG | TEMPERATURE: 98 F | RESPIRATION RATE: 16 BRPM | HEIGHT: 61 IN | DIASTOLIC BLOOD PRESSURE: 82 MMHG | WEIGHT: 179.63 LBS | BODY MASS INDEX: 33.91 KG/M2 | OXYGEN SATURATION: 99 %

## 2019-07-26 DIAGNOSIS — L30.9 DERMATITIS: Primary | ICD-10-CM

## 2019-07-26 PROCEDURE — 99213 OFFICE O/P EST LOW 20 MIN: CPT | Performed by: NURSE PRACTITIONER

## 2019-07-26 RX ORDER — PREDNISONE 20 MG/1
20 TABLET ORAL 2 TIMES DAILY
Qty: 10 TABLET | Refills: 0 | Status: SHIPPED | OUTPATIENT
Start: 2019-07-26 | End: 2019-07-31

## 2019-07-26 NOTE — PATIENT INSTRUCTIONS
Prednisone 20mg twice a day for five days. Zyrtec 10mg at night. Topical triamcinolone ointment twice a day for seven days. Avoid scratching which could lead to secondary infection. Apply cool packs to sites for comfort.

## 2019-07-26 NOTE — PROGRESS NOTES
[address]  PROGRESS NOTE  Chief Complaint:   Patient presents with:  Bite Sting,Insect (integumentary): Pt believes that she was bit by a bug cutting the grass about 5 days ago.  She says that her rash is spreading and so she thinks it m Meds:    Current Outpatient Medications:  predniSONE 20 MG Oral Tab Take 1 tablet (20 mg total) by mouth 2 (two) times daily for 5 days.  Disp: 10 tablet Rfl: 0   triamcinolone acetonide 0.1 % External Ointment Apply 1 Application topically 2 (two) times da (BP Location: Left arm, Patient Position: Sitting, Cuff Size: adult)   Pulse 62   Temp 98.3 °F (36.8 °C) (Temporal)   Resp 16   Ht 61\"   Wt 179 lb 9.6 oz   SpO2 99%   BMI 33.94 kg/m²  Estimated body mass index is 33.94 kg/m² as calculated from the followi times daily for 7 days. Dispense: 1 Tube; Refill: 0      Patient/Caregiver Education: Patient/Caregiver Education: There are no barriers to learning. Medical education done. Outcome: Patient verbalizes understanding.  Patient is notified to call with any

## 2019-10-21 ENCOUNTER — LABORATORY ENCOUNTER (OUTPATIENT)
Dept: LAB | Age: 69
End: 2019-10-21
Attending: FAMILY MEDICINE
Payer: MEDICARE

## 2019-10-21 DIAGNOSIS — I10 ESSENTIAL HYPERTENSION: ICD-10-CM

## 2019-10-21 DIAGNOSIS — M10.9 GOUT, UNSPECIFIED: ICD-10-CM

## 2019-10-21 DIAGNOSIS — E78.00 HYPERCHOLESTEROLEMIA: ICD-10-CM

## 2019-10-21 DIAGNOSIS — I10 ESSENTIAL HYPERTENSION, BENIGN: ICD-10-CM

## 2019-10-21 DIAGNOSIS — N17.9 ACUTE KIDNEY FAILURE, UNSPECIFIED (HCC): ICD-10-CM

## 2019-10-21 DIAGNOSIS — N18.30 CHRONIC KIDNEY DISEASE, STAGE III (MODERATE) (HCC): ICD-10-CM

## 2019-10-21 DIAGNOSIS — E03.9 ACQUIRED HYPOTHYROIDISM: ICD-10-CM

## 2019-10-21 PROCEDURE — 84550 ASSAY OF BLOOD/URIC ACID: CPT

## 2019-10-21 PROCEDURE — 80048 BASIC METABOLIC PNL TOTAL CA: CPT

## 2019-10-21 PROCEDURE — 82040 ASSAY OF SERUM ALBUMIN: CPT

## 2019-10-21 PROCEDURE — 81001 URINALYSIS AUTO W/SCOPE: CPT

## 2019-10-21 PROCEDURE — 83735 ASSAY OF MAGNESIUM: CPT

## 2019-10-21 PROCEDURE — 83970 ASSAY OF PARATHORMONE: CPT

## 2019-10-21 PROCEDURE — 84100 ASSAY OF PHOSPHORUS: CPT

## 2019-10-21 PROCEDURE — 84443 ASSAY THYROID STIM HORMONE: CPT

## 2019-10-21 PROCEDURE — 36415 COLL VENOUS BLD VENIPUNCTURE: CPT

## 2019-10-21 PROCEDURE — 84439 ASSAY OF FREE THYROXINE: CPT

## 2019-10-21 PROCEDURE — 80061 LIPID PANEL: CPT

## 2019-10-21 PROCEDURE — 85027 COMPLETE CBC AUTOMATED: CPT

## 2019-10-24 ENCOUNTER — TELEPHONE (OUTPATIENT)
Dept: FAMILY MEDICINE CLINIC | Facility: CLINIC | Age: 69
End: 2019-10-24

## 2019-10-24 NOTE — TELEPHONE ENCOUNTER
----- Message from Aaliyah Baron MD sent at 10/24/2019  5:33 AM CDT -----  Labs reviewed     Thyroid testing with elevated TSH . Recommend increase dose of levothyroxine. Levothyroxine 75 mcg daily on empty stomach. Cholesterol profile stable .   H

## 2019-10-25 ENCOUNTER — TELEPHONE (OUTPATIENT)
Dept: FAMILY MEDICINE CLINIC | Facility: CLINIC | Age: 69
End: 2019-10-25

## 2019-10-25 RX ORDER — LEVOTHYROXINE SODIUM 75 UG/1
75 CAPSULE ORAL DAILY
Qty: 30 CAPSULE | Refills: 3 | Status: SHIPPED | OUTPATIENT
Start: 2019-10-25 | End: 2021-09-01 | Stop reason: ALTCHOICE

## 2019-10-25 RX ORDER — LEVOTHYROXINE SODIUM 0.07 MG/1
75 TABLET ORAL
Qty: 90 TABLET | Refills: 0 | Status: CANCELLED | OUTPATIENT
Start: 2019-10-25

## 2019-10-25 NOTE — TELEPHONE ENCOUNTER
See other phone note from today. [facility] and spoke with Samayoa Geronimo. Levothyroxine was sent as capsules which insurance will not cover unless tablet version has proven to be unsuccessful. Gave verbal order to Samayoa Geronimo to change to tablets.      I ca

## 2019-10-25 NOTE — TELEPHONE ENCOUNTER
Patient states she received a call from [address] [place] [address] regarding her Levothyroxine, states she was told the brand and dosage changed states insurance will not cover it and she was not aware of the change on higher dosage. Patient requested a call back.

## 2019-10-28 ENCOUNTER — TELEPHONE (OUTPATIENT)
Dept: FAMILY MEDICINE CLINIC | Facility: CLINIC | Age: 69
End: 2019-10-28

## 2019-10-28 DIAGNOSIS — I10 ESSENTIAL HYPERTENSION, BENIGN: ICD-10-CM

## 2019-10-28 DIAGNOSIS — N17.9 ACUTE KIDNEY FAILURE, UNSPECIFIED (HCC): Primary | ICD-10-CM

## 2019-10-28 DIAGNOSIS — M10.9 GOUT, UNSPECIFIED: ICD-10-CM

## 2019-10-28 DIAGNOSIS — I12.9 HYPERTENSIVE RENAL DISEASE WITHOUT FAILURE: ICD-10-CM

## 2019-10-28 DIAGNOSIS — N18.30 CHRONIC KIDNEY DISEASE, STAGE III (MODERATE) (HCC): ICD-10-CM

## 2019-10-28 RX ORDER — LEVOTHYROXINE SODIUM 0.05 MG/1
TABLET ORAL
Qty: 90 TABLET | Refills: 0 | OUTPATIENT
Start: 2019-10-28

## 2019-10-28 NOTE — TELEPHONE ENCOUNTER
See phone notes from 10/25/19. Called [facility] in [place] again and spoke with the pharmacist. He states Ruby Villafuerte still had the note in the chart that they had not heard back from us regarding switching from capsules to tablets.      I informed pharmacist

## 2019-10-28 NOTE — TELEPHONE ENCOUNTER
Received e-scribe refill request for levothyroxine 50 mcg. Patient was increased to 75 mcg daily after recent laboratory testing. Refill denied with the above note.

## 2019-10-28 NOTE — TELEPHONE ENCOUNTER
Patient states that she still needs a refill on her Levothyroxine 75mg to [address] in [place], pt states that she called [facility] to make sure her prescription was ready and they said they haven't heard from the doctor and they still have the pre

## 2019-11-25 ENCOUNTER — OFFICE VISIT (OUTPATIENT)
Dept: FAMILY MEDICINE CLINIC | Facility: CLINIC | Age: 69
End: 2019-11-25
Payer: MEDICARE

## 2019-11-25 VITALS
OXYGEN SATURATION: 98 % | WEIGHT: 180.63 LBS | HEART RATE: 78 BPM | RESPIRATION RATE: 18 BRPM | BODY MASS INDEX: 34.1 KG/M2 | SYSTOLIC BLOOD PRESSURE: 128 MMHG | HEIGHT: 61 IN | DIASTOLIC BLOOD PRESSURE: 78 MMHG | TEMPERATURE: 97 F

## 2019-11-25 DIAGNOSIS — G47.33 OBSTRUCTIVE SLEEP APNEA (ADULT) (PEDIATRIC): Primary | ICD-10-CM

## 2019-11-25 PROCEDURE — 99214 OFFICE O/P EST MOD 30 MIN: CPT | Performed by: NURSE PRACTITIONER

## 2019-11-29 NOTE — PATIENT INSTRUCTIONS
Continue sleep therapy. Order for new machine to [facility]   Follow-up in 32 - 45 days on the new machine - sooner if needed.      Advised if still with sleep apnea and not using CPAP has a  7 fold increase in risk of heart attack, stroke, abnormal heart rh

## 2019-11-29 NOTE — PROGRESS NOTES
[facility]  [facility]        HPI:   This is a 71year old female coming in for Patient presents with:  Obstructive Sleep Apnea (ANNABELLE): compliance f/u      HPI:     Patient is present to review her sleep therapy.   States she is d Use      Smoking status: Never Smoker      Smokeless tobacco: Never Used    Alcohol use: No      Alcohol/week: 0.0 standard drinks    Drug use: No    Family History:  Family History   Problem Relation Age of Onset   • Heart Disorder Father    • Cancer Jose Enrique Nelson 9.6 oz (81.9 kg)   SpO2 98%   BMI 34.12 kg/m²  Estimated body mass index is 34.12 kg/m² as calculated from the following:    Height as of this encounter: 61\". Weight as of this encounter: 180 lb 9.6 oz (81.9 kg). Neck in inches:       Wt Readings from death,  increased risk of driving accidents. Advised to refrain from driving when sleepy. COMPLIANCE is required by insurance for 4 hours a night most nights of the week.   Recommend weight loss, and maintain and optimal BMI with Exercise 30 minutes mo

## 2019-12-05 ENCOUNTER — TELEPHONE (OUTPATIENT)
Dept: FAMILY MEDICINE CLINIC | Facility: CLINIC | Age: 69
End: 2019-12-05

## 2019-12-05 DIAGNOSIS — G47.33 OSA (OBSTRUCTIVE SLEEP APNEA): Primary | ICD-10-CM

## 2019-12-05 NOTE — TELEPHONE ENCOUNTER
Call to Criss Guillen from [facility] is incomplete and missing signature. Initial psg is not sufficient with medicare's guidelines. Recommends repeat diagnostic study. Please call patient and offer in lab study with [facility] or at [place].      Call

## 2019-12-06 ENCOUNTER — OFFICE VISIT (OUTPATIENT)
Dept: FAMILY MEDICINE CLINIC | Facility: CLINIC | Age: 69
End: 2019-12-06
Payer: MEDICARE

## 2019-12-06 VITALS
BODY MASS INDEX: 33.33 KG/M2 | HEIGHT: 60.25 IN | SYSTOLIC BLOOD PRESSURE: 110 MMHG | HEART RATE: 115 BPM | OXYGEN SATURATION: 98 % | DIASTOLIC BLOOD PRESSURE: 70 MMHG | WEIGHT: 172 LBS | TEMPERATURE: 99 F

## 2019-12-06 DIAGNOSIS — H65.92 LEFT NON-SUPPURATIVE OTITIS MEDIA: ICD-10-CM

## 2019-12-06 DIAGNOSIS — J02.9 SORE THROAT: Primary | ICD-10-CM

## 2019-12-06 DIAGNOSIS — R05.9 COUGH: ICD-10-CM

## 2019-12-06 PROCEDURE — 87081 CULTURE SCREEN ONLY: CPT | Performed by: NURSE PRACTITIONER

## 2019-12-06 PROCEDURE — 87880 STREP A ASSAY W/OPTIC: CPT | Performed by: NURSE PRACTITIONER

## 2019-12-06 PROCEDURE — 99214 OFFICE O/P EST MOD 30 MIN: CPT | Performed by: NURSE PRACTITIONER

## 2019-12-06 RX ORDER — PROMETHAZINE HYDROCHLORIDE AND CODEINE PHOSPHATE 6.25; 1 MG/5ML; MG/5ML
5 SOLUTION ORAL
Qty: 180 ML | Refills: 0 | Status: SHIPPED | OUTPATIENT
Start: 2019-12-06 | End: 2019-12-20

## 2019-12-06 RX ORDER — AMOXICILLIN AND CLAVULANATE POTASSIUM 875; 125 MG/1; MG/1
1 TABLET, FILM COATED ORAL 2 TIMES DAILY
Qty: 20 TABLET | Refills: 0 | Status: SHIPPED | OUTPATIENT
Start: 2019-12-06 | End: 2019-12-16

## 2019-12-06 NOTE — PATIENT INSTRUCTIONS
Strep test done during exam, negative. Will begin antibiotic therapy for 10 days. Advised to take entire course of antibiotic even if she begins to feel better. Given taking cough medication, especially at night will help with sleeping.     Patient patches or sores in the mouth or throat  Side effects that usually do not require medical attention (report to your doctor or health care professional if they continue or are bothersome):  · diarrhea  · dizziness  · headache  · nausea, vomiting  · stomach your doctor, pharmacist, or health care provider. Copyright© 2019 Elsevier        Amoxicillin; Clavulanic Acid tablets  Brand Name: Augmentin  What is this medicine?   AMOXICILLIN; CLAVULANIC ACID (a mox i JANIE in; NORBERTO vega AS id) is a penicillin anti pills  · methotrexate  · probenecid    What if I miss a dose? If you miss a dose, take it as soon as you can. If it is almost time for your next dose, take only that dose. Do not take double or extra doses. Where should I keep my medicine?   Keep out of t

## 2019-12-06 NOTE — PROGRESS NOTES
HPI:    Patient ID: Cinthya Sylvester is a 71year old female. Patient presents to the clinic today with complaints of cough, congestion, bilateral ear pain, headache, sore throat, and body chills and aches that began about 5 days ago.   Patient states sh appears ill. HENT:   Head: Normocephalic and atraumatic. Right Ear: Hearing, external ear and ear canal normal. A middle ear effusion is present. Left Ear: Hearing normal. There is tenderness. Tympanic membrane is injected.  A middle ear effusion is p 6.25-10 MG/5ML Oral Solution 180 mL 0     Sig: Take 5 mL by mouth every 4 to 6 hours for 14 days.        Imaging & Referrals:  None       KA#[id number]

## 2019-12-09 ENCOUNTER — TELEPHONE (OUTPATIENT)
Dept: FAMILY MEDICINE CLINIC | Facility: CLINIC | Age: 69
End: 2019-12-09

## 2019-12-09 NOTE — TELEPHONE ENCOUNTER
----- Message from HARJINDER Casas sent at 12/9/2019 12:51 PM CST -----  Reviewed lab, culture is normal, negative for strep A culture. Hope she feels better.  Please inform patient   Thank you    Katarina Barrera

## 2020-01-06 ENCOUNTER — OFFICE VISIT (OUTPATIENT)
Dept: FAMILY MEDICINE CLINIC | Facility: CLINIC | Age: 70
End: 2020-01-06
Payer: MEDICARE

## 2020-01-06 ENCOUNTER — HOSPITAL ENCOUNTER (OUTPATIENT)
Dept: MAMMOGRAPHY | Age: 70
Discharge: HOME | End: 2020-01-06
Attending: FAMILY MEDICINE
Payer: MEDICARE

## 2020-01-06 VITALS
WEIGHT: 174 LBS | RESPIRATION RATE: 14 BRPM | HEART RATE: 76 BPM | SYSTOLIC BLOOD PRESSURE: 174 MMHG | DIASTOLIC BLOOD PRESSURE: 98 MMHG | BODY MASS INDEX: 34 KG/M2 | TEMPERATURE: 98 F

## 2020-01-06 DIAGNOSIS — J40 SINOBRONCHITIS: ICD-10-CM

## 2020-01-06 DIAGNOSIS — Z12.31 BREAST CANCER SCREENING BY MAMMOGRAM: ICD-10-CM

## 2020-01-06 DIAGNOSIS — J32.9 SINOBRONCHITIS: ICD-10-CM

## 2020-01-06 DIAGNOSIS — B37.9 YEAST INFECTION: ICD-10-CM

## 2020-01-06 DIAGNOSIS — R05.9 COUGH: Primary | ICD-10-CM

## 2020-01-06 PROBLEM — I10 ESSENTIAL HYPERTENSION: Status: RESOLVED | Noted: 2017-02-12 | Resolved: 2020-01-06

## 2020-01-06 PROCEDURE — 77063 BREAST TOMOSYNTHESIS BI: CPT | Performed by: FAMILY MEDICINE

## 2020-01-06 PROCEDURE — 99214 OFFICE O/P EST MOD 30 MIN: CPT | Performed by: FAMILY MEDICINE

## 2020-01-06 PROCEDURE — 77067 SCR MAMMO BI INCL CAD: CPT | Performed by: FAMILY MEDICINE

## 2020-01-06 RX ORDER — BENZONATATE 200 MG/1
200 CAPSULE ORAL 3 TIMES DAILY PRN
Qty: 30 CAPSULE | Refills: 1 | Status: SHIPPED | OUTPATIENT
Start: 2020-01-06 | End: 2020-02-05 | Stop reason: ALTCHOICE

## 2020-01-06 RX ORDER — FLUCONAZOLE 200 MG/1
200 TABLET ORAL DAILY
Qty: 2 TABLET | Refills: 0 | Status: SHIPPED | OUTPATIENT
Start: 2020-01-06 | End: 2020-02-05 | Stop reason: ALTCHOICE

## 2020-01-06 NOTE — PATIENT INSTRUCTIONS
Cough medication as needed for 1 -2 weeks.      Antifungal pill  - take one now - take one in a week if symptoms still present

## 2020-01-07 NOTE — PROGRESS NOTES
Chief Complaint:   Patient presents with:  Cough: since beginning of December  Vaginal Problem: itching x 10 days       HPI:   This is a 71year old female coming in for two issues   Recently with bronchitis   Still with cough  - has improved.    Took all o (VITAMIN C) 1000 MG Oral Tab Take 1,000 mg by mouth daily. • Docusate Sodium (STOOL SOFTENER OR) Take 1 tablet by mouth as needed. • Cyanocobalamin (B-12) 1000 MCG Oral Cap Take 1 tablet by mouth daily. • IRON OR Take 1 tablet by mouth daily. no JVD, no thyromegaly. SKIN: No rashes, no skin lesion, no bruising, good turgor. HEART:  Regular rate and rhythm, no murmurs,     LUNGS: Clear to auscultation bilterally, no rales/rhonchi/wheezing.     ABDOMEN:  Soft, nondistended, nontender, bowel so

## 2020-01-31 ENCOUNTER — SLEEP STUDY (OUTPATIENT)
Dept: FAMILY MEDICINE CLINIC | Facility: CLINIC | Age: 70
End: 2020-01-31
Payer: MEDICARE

## 2020-01-31 ENCOUNTER — TELEPHONE (OUTPATIENT)
Dept: FAMILY MEDICINE CLINIC | Facility: CLINIC | Age: 70
End: 2020-01-31

## 2020-01-31 DIAGNOSIS — G47.19 EXCESSIVE DAYTIME SLEEPINESS: Primary | ICD-10-CM

## 2020-01-31 PROCEDURE — 95810 POLYSOM 6/> YRS 4/> PARAM: CPT | Performed by: FAMILY MEDICINE

## 2020-02-05 ENCOUNTER — OFFICE VISIT (OUTPATIENT)
Dept: FAMILY MEDICINE CLINIC | Facility: CLINIC | Age: 70
End: 2020-02-05
Payer: MEDICARE

## 2020-02-05 VITALS
WEIGHT: 173 LBS | OXYGEN SATURATION: 97 % | HEART RATE: 86 BPM | BODY MASS INDEX: 33.09 KG/M2 | DIASTOLIC BLOOD PRESSURE: 80 MMHG | SYSTOLIC BLOOD PRESSURE: 126 MMHG | HEIGHT: 60.5 IN | TEMPERATURE: 98 F

## 2020-02-05 DIAGNOSIS — G25.81 RESTLESS LEGS SYNDROME: ICD-10-CM

## 2020-02-05 DIAGNOSIS — G47.33 OBSTRUCTIVE SLEEP APNEA (ADULT) (PEDIATRIC): Primary | ICD-10-CM

## 2020-02-05 DIAGNOSIS — I12.9 HYPERTENSIVE RENAL DISEASE WITHOUT FAILURE: ICD-10-CM

## 2020-02-05 PROCEDURE — 99213 OFFICE O/P EST LOW 20 MIN: CPT | Performed by: NURSE PRACTITIONER

## 2020-02-05 RX ORDER — ESZOPICLONE 2 MG/1
2 TABLET, FILM COATED ORAL ONCE
Qty: 1 TABLET | Refills: 0 | Status: SHIPPED | OUTPATIENT
Start: 2020-02-05 | End: 2020-02-05

## 2020-02-05 NOTE — PATIENT INSTRUCTIONS
Order for split night sleep study will be sent to Sleep Lab at [facility].  They will call to set up an appointment in the next week. If not heard from them, please call them at [phone]. If any problems, please call us at [phone].       Ta

## 2020-02-05 NOTE — PROGRESS NOTES
[facility]  SLEEP PROGRESS NOTE        HPI:   This is a 71year old female coming in for Patient presents with:  Sleep Problem: f/u Sleep Study results      HPI:     Patient is present to follow-up on her sleep study.   The study showed i • Acute renal failure ([facility] [address] 8/16/2017   • Cellulitis of hand, left 11/25/2017   • CKD (chronic kidney disease) stage 3, GFR 30-59 ml/min ([facility]) 7/26/2017   • Essential hypertension    • Gout 3/12/2013   • Sleep apnea 2/16/2013   • Vitreous degeneration 2/ Obstructive sleep apnea (adult) (pediatric)     Vitreous degeneration     CKD (chronic kidney disease) stage 3, GFR 30-59 ml/min ([facility])     Age-related nuclear cataract of right eye     Acquired hypothyroidism     Hypercholesterolemia     Yeast infection MEDICINE - EXTERNAL    3. Restless legs syndrome  - SLEEP MEDICINE - EXTERNAL    Patient Instructions   Order for split night sleep study will be sent to Sleep Lab at [facility].  They will call to set up an appointment in the next week.   If not heard f Prescriptions     Signed Prescriptions Disp Refills   • Eszopiclone 2 MG Oral Tab 1 tablet 0     Sig: Take 1 tablet (2 mg total) by mouth one time for 1 dose. Take when get to the sleep lab. Outcome: Parent verbalizes understanding.  Parent is notifie

## 2020-02-24 ENCOUNTER — TELEPHONE (OUTPATIENT)
Dept: FAMILY MEDICINE CLINIC | Facility: CLINIC | Age: 70
End: 2020-02-24

## 2020-02-24 NOTE — TELEPHONE ENCOUNTER
Spoke to patient regarding recommendation that patient needs a home sleep study.  Patient is going to schedule a follow up sleep study with [facility] lab instead

## 2020-02-25 RX ORDER — LEVOTHYROXINE SODIUM 0.07 MG/1
TABLET ORAL
Qty: 30 TABLET | Refills: 2 | Status: SHIPPED | OUTPATIENT
Start: 2020-02-25 | End: 2020-05-27

## 2020-02-25 NOTE — TELEPHONE ENCOUNTER
Future appt:     Your appointments     Date & Time Appointment Department [facility])    Apr 06, 2020  1:15 PM CDT Laboratory Visit with REF R[facility] ([facility] Ref [facility] [place])        Apr 22, 2020  9:00 AM CDT Medicare Annual Well Visit with ESTHER

## 2020-05-07 ENCOUNTER — APPOINTMENT (OUTPATIENT)
Dept: LAB | Facility: HOSPITAL | Age: 70
End: 2020-05-07
Attending: INTERNAL MEDICINE
Payer: MEDICARE

## 2020-05-07 DIAGNOSIS — I10 ESSENTIAL HYPERTENSION, BENIGN: ICD-10-CM

## 2020-05-07 DIAGNOSIS — N18.30 CHRONIC KIDNEY DISEASE, STAGE III (MODERATE) (HCC): ICD-10-CM

## 2020-05-07 DIAGNOSIS — M10.9 GOUT, UNSPECIFIED: ICD-10-CM

## 2020-05-07 DIAGNOSIS — N17.9 ACUTE KIDNEY FAILURE, UNSPECIFIED (HCC): ICD-10-CM

## 2020-05-07 DIAGNOSIS — I12.9 HYPERTENSIVE RENAL DISEASE WITHOUT FAILURE: ICD-10-CM

## 2020-05-07 PROCEDURE — 83970 ASSAY OF PARATHORMONE: CPT

## 2020-05-07 PROCEDURE — 80048 BASIC METABOLIC PNL TOTAL CA: CPT

## 2020-05-07 PROCEDURE — 36415 COLL VENOUS BLD VENIPUNCTURE: CPT

## 2020-05-07 PROCEDURE — 85027 COMPLETE CBC AUTOMATED: CPT

## 2020-05-07 PROCEDURE — 82306 VITAMIN D 25 HYDROXY: CPT

## 2020-05-07 PROCEDURE — 81001 URINALYSIS AUTO W/SCOPE: CPT

## 2020-05-07 PROCEDURE — 82040 ASSAY OF SERUM ALBUMIN: CPT

## 2020-05-07 PROCEDURE — 83735 ASSAY OF MAGNESIUM: CPT

## 2020-05-07 PROCEDURE — 84550 ASSAY OF BLOOD/URIC ACID: CPT

## 2020-05-07 PROCEDURE — 84100 ASSAY OF PHOSPHORUS: CPT

## 2020-05-27 RX ORDER — LEVOTHYROXINE SODIUM 0.07 MG/1
TABLET ORAL
Qty: 30 TABLET | Refills: 2 | Status: SHIPPED | OUTPATIENT
Start: 2020-05-27 | End: 2020-08-24

## 2020-05-27 NOTE — TELEPHONE ENCOUNTER
Future appt: Your appointments     Date & Time Appointment Department [facility])    Tima 10, 2020  9:00 AM CDT Virtual Phone Visit with Antonietta Clark, [address] [place] [address] ([facility])    You have been scheduled for a Virtual Telephone visit with your provider. Please be available at your phone so that your physician can contact you, and be prepared with any questions or concerns. You may be billed a copay at a later time, depending upon your insurance. As always, your health is our priority. Aug 04, 2020 10:00 AM CDT Medicare Annual Well Visit with Wali Spears MD [address] [place] [address] ([facility])            [address] [place] [address] Kelly  Jeffrey Howard [address] [id number] [phone]        Last Appointment with provider:  4/12/19 physical  Last appointment at [facility]:  2/5/2020  Cholesterol, Total (mg/dL)   Date Value   10/21/2019 214 (H)     HDL Cholesterol (mg/dL)   Date Value   10/21/2019 68 (H)     LDL Cholesterol (mg/dL)   Date Value   10/21/2019 116 (H)     Triglycerides (mg/dL)   Date Value   10/21/2019 150 (H)     No results found for: EAG, A1C  Lab Results   Component Value Date    T4F 0.8 10/21/2019    TSH 4.100 (H) 10/21/2019       No follow-ups on file.

## 2020-06-09 ENCOUNTER — TELEPHONE (OUTPATIENT)
Dept: FAMILY MEDICINE CLINIC | Facility: CLINIC | Age: 70
End: 2020-06-09

## 2020-06-09 NOTE — TELEPHONE ENCOUNTER
Elise Roach  verbally consents to a Virtual/Telephone Check-In service on 6/9/2020. Patient understands and accepts financial responsibility for any deductible, co-insurance and/or co-pays associated with this service.     .  Future Appointments

## 2020-06-10 ENCOUNTER — VIRTUAL PHONE E/M (OUTPATIENT)
Dept: FAMILY MEDICINE CLINIC | Facility: CLINIC | Age: 70
End: 2020-06-10

## 2020-06-10 VITALS — BODY MASS INDEX: 32 KG/M2 | WEIGHT: 169 LBS

## 2020-06-10 DIAGNOSIS — G47.33 OBSTRUCTIVE SLEEP APNEA (ADULT) (PEDIATRIC): Primary | ICD-10-CM

## 2020-06-10 PROCEDURE — 99442 PHONE E/M BY PHYS 11-20 MIN: CPT | Performed by: NURSE PRACTITIONER

## 2020-06-10 RX ORDER — ESZOPICLONE 2 MG/1
TABLET, FILM COATED ORAL
COMMUNITY
Start: 2020-02-06 | End: 2020-10-07

## 2020-06-10 NOTE — PROGRESS NOTES
[address] [place] [address]  SLEEP PROGRESS NOTE    [place]  verbally consents to a Virtual/Telephone Check-In service on 6/10/2020.     Patient understands and accepts financial responsibility for any deductible, co-insurance and/or co-pays asso STUDY - 96%   AHI  (/hr) - -   RDI (/hr) - -   PLM INDEX (/hr) - 16.4   SS Facility - [facility]   Sleep Study CM (CM H2O) - -   Sleep EFFC (%) - -   Sleep REM (%) - -   TOT Sleep TM (min) - -         Past Medical History:   Diagnosis Date   • daily.          Counseling given: No         Problem List:  Patient Active Problem List:     Other specified abnormal findings of blood chemistry     Allergic rhinitis     Anemia     Pain in thoracic spine     Inflammatory disease of breast     Gout     Ac schedule. Filters and seals shall be changed every 1 month,  Hoses every 3 months,   CPAP mask and humidifier  chamber changed every 6 month  with the Durable medical equipment provider.          Advised if still with sleep apnea and not using CPAP has a

## 2020-06-10 NOTE — PATIENT INSTRUCTIONS
Continue sleep therapy. Follow-up in 6 months (in person) - sooner if needed.      Advised if still with sleep apnea and not using CPAP has a  7 fold increase in risk of heart attack, stroke, abnormal heart rhythm  and death,  increased risk of driving ac

## 2020-06-10 NOTE — PROGRESS NOTES
[address] [place] [address]  SLEEP PROGRESS NOTE    Frannie Vivienne  verbally consents to a Virtual/Telephone Check-In service on 6/10/2020.     Patient understands and accepts financial responsibility for any deductible, co-insurance and/or co-pays asso (/hr) 16.4 15.5   [facility]   Sleep Study CM (CM H2O) - -   Sleep EFFC (%) - -   Sleep REM (%) - -   TOT Sleep TM (min) - -         Past Medical History:   Diagnosis Date   • Acquired hypothyroidism 4/10/2018 given:  No         Problem List:  Patient Active Problem List:     Other specified abnormal findings of blood chemistry     Allergic rhinitis     Anemia     Pain in thoracic spine     Inflammatory disease of breast     Gout     Acute pancreatitis     Restle using CPAP has a  7 fold increase in risk of heart attack, stroke, abnormal heart rhythm  and death,  increased risk of driving accidents. Advised to refrain from driving when sleepy.     COMPLIANCE is required by insurance for 4 hours a night most nights

## 2020-07-10 ENCOUNTER — TELEPHONE (OUTPATIENT)
Dept: FAMILY MEDICINE CLINIC | Facility: CLINIC | Age: 70
End: 2020-07-10

## 2020-07-10 RX ORDER — PREDNISONE 10 MG/1
TABLET ORAL
Qty: 30 TABLET | Refills: 0 | Status: SHIPPED | OUTPATIENT
Start: 2020-07-10 | End: 2020-10-07

## 2020-07-10 NOTE — TELEPHONE ENCOUNTER
Patient states she has a rash like she has had before and Dr. Ana María Wright is very familiar with these rashes. States that its itchy and she's had it for days now and cannot take it any longer.  States Dr. Ana María Wright usually prescribes her prednisone when this happens to \

## 2020-08-14 ENCOUNTER — TELEPHONE (OUTPATIENT)
Dept: FAMILY MEDICINE CLINIC | Facility: CLINIC | Age: 70
End: 2020-08-14

## 2020-08-14 NOTE — TELEPHONE ENCOUNTER
sleep lab test    OTW for Humaira to be in the office      Future Appointments   Date Time Provider Delia Trudi   10/7/2020 10:00 AM Izabel Gaviria MD [facility]

## 2020-08-17 NOTE — TELEPHONE ENCOUNTER
Patient was calling because she was upset about receiving a postcard from [facility] sleep lab. Was told that she needed to schedule her study and had not even though they had tried to reach her. Reviewed the flowsheet and the last sleep study.   The apolonia

## 2020-08-23 DIAGNOSIS — M10.9 GOUT, UNSPECIFIED: ICD-10-CM

## 2020-08-23 DIAGNOSIS — I12.9 RENAL HYPERTENSION: ICD-10-CM

## 2020-08-23 DIAGNOSIS — N17.9 ACUTE KIDNEY FAILURE, UNSPECIFIED (HCC): Primary | ICD-10-CM

## 2020-08-23 DIAGNOSIS — N18.30 CHRONIC KIDNEY DISEASE, STAGE III (MODERATE) (HCC): ICD-10-CM

## 2020-08-23 DIAGNOSIS — E66.3 SEVERELY OVERWEIGHT: ICD-10-CM

## 2020-08-23 DIAGNOSIS — I10 ESSENTIAL HYPERTENSION, MALIGNANT: ICD-10-CM

## 2020-08-24 RX ORDER — LEVOTHYROXINE SODIUM 0.07 MG/1
TABLET ORAL
Qty: 30 TABLET | Refills: 2 | Status: SHIPPED | OUTPATIENT
Start: 2020-08-24 | End: 2020-11-18

## 2020-08-24 NOTE — TELEPHONE ENCOUNTER
Future appt:     Your appointments     Date & Time Appointment Department [facility])    Oct 07, 2020 10:00 AM CDT Medicare Annual Well Visit with Chase Mackay MD [facility], Maggie Sigala ([facility])        Dec 10,

## 2020-09-14 ENCOUNTER — TELEPHONE (OUTPATIENT)
Dept: FAMILY MEDICINE CLINIC | Facility: CLINIC | Age: 70
End: 2020-09-14

## 2020-09-14 NOTE — TELEPHONE ENCOUNTER
Patient would like to know if [facility]'s [facility]. Would like a call back.

## 2020-09-15 NOTE — TELEPHONE ENCOUNTER
Patient returned call and was informed of two dermatologists within the [facility] that Dr. Chaim Silva refers to: Dr. Bonny Dodson and Dr. Ann Simpson. Gave patient numbers to both the [place] office as well as the [place] office.

## 2020-09-22 ENCOUNTER — LAB ENCOUNTER (OUTPATIENT)
Dept: LAB | Age: 70
End: 2020-09-22
Attending: FAMILY MEDICINE
Payer: MEDICARE

## 2020-09-22 DIAGNOSIS — E66.3 SEVERELY OVERWEIGHT: ICD-10-CM

## 2020-09-22 DIAGNOSIS — N17.9 ACUTE KIDNEY FAILURE, UNSPECIFIED (HCC): ICD-10-CM

## 2020-09-22 DIAGNOSIS — I12.9 RENAL HYPERTENSION: ICD-10-CM

## 2020-09-22 DIAGNOSIS — I10 ESSENTIAL HYPERTENSION, MALIGNANT: ICD-10-CM

## 2020-09-22 DIAGNOSIS — M10.9 GOUT, UNSPECIFIED: ICD-10-CM

## 2020-09-22 DIAGNOSIS — N18.30 CHRONIC KIDNEY DISEASE, STAGE III (MODERATE) (HCC): ICD-10-CM

## 2020-09-22 LAB
ALBUMIN SERPL-MCNC: 3.9 G/DL (ref 3.4–5)
ANION GAP SERPL CALC-SCNC: 4 MMOL/L (ref 0–18)
BILIRUB UR QL STRIP.AUTO: NEGATIVE
BUN BLD-MCNC: 31 MG/DL (ref 7–18)
BUN/CREAT SERPL: 27.7 (ref 10–20)
CALCIUM BLD-MCNC: 9.4 MG/DL (ref 8.5–10.1)
CHLORIDE SERPL-SCNC: 108 MMOL/L (ref 98–112)
CO2 SERPL-SCNC: 26 MMOL/L (ref 21–32)
COLOR UR AUTO: YELLOW
CREAT BLD-MCNC: 1.12 MG/DL
DEPRECATED RDW RBC AUTO: 46.7 FL (ref 35.1–46.3)
ERYTHROCYTE [DISTWIDTH] IN BLOOD BY AUTOMATED COUNT: 13.2 % (ref 11–15)
GLUCOSE BLD-MCNC: 108 MG/DL (ref 70–99)
GLUCOSE UR STRIP.AUTO-MCNC: NEGATIVE MG/DL
HAV IGM SER QL: 2.3 MG/DL (ref 1.6–2.6)
HCT VFR BLD AUTO: 39.4 %
HGB BLD-MCNC: 12.4 G/DL
HYALINE CASTS #/AREA URNS AUTO: PRESENT /LPF
KETONES UR STRIP.AUTO-MCNC: NEGATIVE MG/DL
MCH RBC QN AUTO: 30.2 PG (ref 26–34)
MCHC RBC AUTO-ENTMCNC: 31.5 G/DL (ref 31–37)
MCV RBC AUTO: 95.9 FL
NITRITE UR QL STRIP.AUTO: NEGATIVE
OSMOLALITY SERPL CALC.SUM OF ELEC: 293 MOSM/KG (ref 275–295)
PATIENT FASTING Y/N/NP: YES
PH UR STRIP.AUTO: 5 [PH] (ref 4.5–8)
PHOSPHATE SERPL-MCNC: 2.8 MG/DL (ref 2.5–4.9)
PLATELET # BLD AUTO: 176 10(3)UL (ref 150–450)
POTASSIUM SERPL-SCNC: 4 MMOL/L (ref 3.5–5.1)
PROT UR STRIP.AUTO-MCNC: NEGATIVE MG/DL
PTH-INTACT SERPL-MCNC: 41.1 PG/ML (ref 18.5–88)
RBC # BLD AUTO: 4.11 X10(6)UL
RBC UR QL AUTO: NEGATIVE
SODIUM SERPL-SCNC: 138 MMOL/L (ref 136–145)
SP GR UR STRIP.AUTO: 1.02 (ref 1–1.03)
URATE SERPL-MCNC: 4.8 MG/DL
UROBILINOGEN UR STRIP.AUTO-MCNC: <2 MG/DL
WBC # BLD AUTO: 6.1 X10(3) UL (ref 4–11)

## 2020-09-22 PROCEDURE — 83970 ASSAY OF PARATHORMONE: CPT

## 2020-09-22 PROCEDURE — 82040 ASSAY OF SERUM ALBUMIN: CPT

## 2020-09-22 PROCEDURE — 84550 ASSAY OF BLOOD/URIC ACID: CPT

## 2020-09-22 PROCEDURE — 85027 COMPLETE CBC AUTOMATED: CPT

## 2020-09-22 PROCEDURE — 36415 COLL VENOUS BLD VENIPUNCTURE: CPT

## 2020-09-22 PROCEDURE — 81001 URINALYSIS AUTO W/SCOPE: CPT

## 2020-09-22 PROCEDURE — 80048 BASIC METABOLIC PNL TOTAL CA: CPT

## 2020-09-22 PROCEDURE — 83735 ASSAY OF MAGNESIUM: CPT

## 2020-09-22 PROCEDURE — 84100 ASSAY OF PHOSPHORUS: CPT

## 2020-10-07 ENCOUNTER — OFFICE VISIT (OUTPATIENT)
Dept: FAMILY MEDICINE CLINIC | Facility: CLINIC | Age: 70
End: 2020-10-07
Payer: MEDICARE

## 2020-10-07 VITALS
RESPIRATION RATE: 16 BRPM | DIASTOLIC BLOOD PRESSURE: 86 MMHG | HEIGHT: 60.5 IN | HEART RATE: 82 BPM | TEMPERATURE: 97 F | SYSTOLIC BLOOD PRESSURE: 124 MMHG | WEIGHT: 180.63 LBS | OXYGEN SATURATION: 98 % | BODY MASS INDEX: 34.55 KG/M2

## 2020-10-07 DIAGNOSIS — Z00.00 ENCOUNTER FOR ANNUAL HEALTH EXAMINATION: Primary | ICD-10-CM

## 2020-10-07 DIAGNOSIS — I10 BENIGN HYPERTENSION: ICD-10-CM

## 2020-10-07 DIAGNOSIS — N18.30 STAGE 3 CHRONIC KIDNEY DISEASE, UNSPECIFIED WHETHER STAGE 3A OR 3B CKD (HCC): ICD-10-CM

## 2020-10-07 DIAGNOSIS — E03.9 ACQUIRED HYPOTHYROIDISM: ICD-10-CM

## 2020-10-07 DIAGNOSIS — E78.00 HYPERCHOLESTEROLEMIA: ICD-10-CM

## 2020-10-07 DIAGNOSIS — M1A.0790 IDIOPATHIC CHRONIC GOUT OF FOOT WITHOUT TOPHUS, UNSPECIFIED LATERALITY: ICD-10-CM

## 2020-10-07 PROBLEM — H43.12 VITREOUS HEMORRHAGE, LEFT EYE (HCC): Status: ACTIVE | Noted: 2020-10-07

## 2020-10-07 PROBLEM — J30.9 ALLERGIC RHINITIS: Status: RESOLVED | Noted: 2017-02-12 | Resolved: 2020-10-07

## 2020-10-07 PROBLEM — H43.819 POSTERIOR VITREOUS DETACHMENT: Status: RESOLVED | Noted: 2018-06-11 | Resolved: 2020-10-07

## 2020-10-07 PROBLEM — B37.9 YEAST INFECTION: Status: RESOLVED | Noted: 2020-01-06 | Resolved: 2020-10-07

## 2020-10-07 PROBLEM — D64.9 ANEMIA: Status: RESOLVED | Noted: 2017-01-13 | Resolved: 2020-10-07

## 2020-10-07 PROBLEM — H43.819 POSTERIOR VITREOUS DETACHMENT: Status: ACTIVE | Noted: 2018-06-11

## 2020-10-07 PROBLEM — H25.11 AGE-RELATED NUCLEAR CATARACT OF RIGHT EYE: Status: RESOLVED | Noted: 2018-01-26 | Resolved: 2020-10-07

## 2020-10-07 PROBLEM — H43.12 VITREOUS HEMORRHAGE, LEFT EYE (HCC): Status: RESOLVED | Noted: 2020-10-07 | Resolved: 2020-10-07

## 2020-10-07 PROCEDURE — G0439 PPPS, SUBSEQ VISIT: HCPCS | Performed by: FAMILY MEDICINE

## 2020-10-07 PROCEDURE — 99213 OFFICE O/P EST LOW 20 MIN: CPT | Performed by: FAMILY MEDICINE

## 2020-10-07 RX ORDER — BIOTIN 1 MG
1 TABLET ORAL DAILY
COMMUNITY
End: 2020-12-10

## 2020-10-08 NOTE — PROGRESS NOTES
Chief Complaint:   Patient presents with: Well Adult      HPI:   This is a 79year old female coming in for health care check   Feeling well   A lot of stresses this year with poor health of . Reviewed recent labs.          Results for orders pl 2.0 mg/dL    Nitrite Urine Negative Negative    Leukocyte Esterase Urine Large (A) Negative    WBC Urine 11-20 (A) <5 /HPF    RBC URINE 0-2 0 - 2 /HPF    Bacteria Urine Rare (A) None Seen    Squamous Epi.  Cells Large (A) Small /LPF    Renal Tubular Epithel Drug use: No    Other Topics      Concerns:           Current Meds:  Current Outpatient Medications   Medication Sig Dispense Refill   • Cholecalciferol (VITAMIN D3) 25 MCG (1000 UT) Oral Cap Take 1 tablet by mouth daily.      • LEVOTHYROXINE SODIUM 75 M body mass index is 34.69 kg/m² as calculated from the following:    Height as of this encounter: 60.5\". Weight as of this encounter: 180 lb 9.6 oz (81.9 kg). Vital signs reviewed. Appears stated age, well groomed.     Physical Exam:    GEN:  Patient is questions, complications, allergies, or worsening or changing symptoms. Patient is to call with any side effects or complications from the treatments as a result of today.      Problem List:  Patient Active Problem List:     Gout     Sciatica     Obstructi

## 2020-11-17 NOTE — TELEPHONE ENCOUNTER
Future appt: Your appointments     Date & Time Appointment Department [facility])    Dec 10, 2020  1:15 PM CST Sleep Follow Up with [address] [facility] [address], [address], [address], [place] ([place])        Jan 21, 2021  9:45 AM CST Laboratory Visit with REF Andreas K[facility] ([facility] Ref [facility] [place])            José Restorationist, [facility] [place]  [address] [place] [address]  [place] Rasha[place] [id number]  [phone] [facility]  [facility] Ref Lab [address] [facility]         Last Appointment with provider:   10/7/2020 Well adult; recheck in 1 year  Last appointment at [facility]:  10/7/2020  Cholesterol, Total (mg/dL)   Date Value   10/21/2019 214 (H)     HDL Cholesterol (mg/dL)   Date Value   10/21/2019 68 (H)     LDL Cholesterol (mg/dL)   Date Value   10/21/2019 116 (H)     Triglycerides (mg/dL)   Date Value   10/21/2019 150 (H)     No results found for: EAG, A1C  Lab Results   Component Value Date    T4F 0.8 10/21/2019    TSH 4.100 (H) 10/21/2019       No follow-ups on file.

## 2020-11-18 RX ORDER — LEVOTHYROXINE SODIUM 0.07 MG/1
75 TABLET ORAL DAILY
Qty: 30 TABLET | Refills: 11 | Status: SHIPPED | OUTPATIENT
Start: 2020-11-18 | End: 2021-04-22

## 2020-12-10 ENCOUNTER — OFFICE VISIT (OUTPATIENT)
Dept: FAMILY MEDICINE CLINIC | Facility: CLINIC | Age: 70
End: 2020-12-10
Payer: MEDICARE

## 2020-12-10 VITALS
BODY MASS INDEX: 34.43 KG/M2 | RESPIRATION RATE: 16 BRPM | TEMPERATURE: 98 F | OXYGEN SATURATION: 95 % | WEIGHT: 180 LBS | SYSTOLIC BLOOD PRESSURE: 116 MMHG | HEIGHT: 60.5 IN | HEART RATE: 82 BPM | DIASTOLIC BLOOD PRESSURE: 84 MMHG

## 2020-12-10 DIAGNOSIS — G47.33 OBSTRUCTIVE SLEEP APNEA (ADULT) (PEDIATRIC): Primary | ICD-10-CM

## 2020-12-10 PROCEDURE — 99214 OFFICE O/P EST MOD 30 MIN: CPT | Performed by: NURSE PRACTITIONER

## 2020-12-10 RX ORDER — ASCORBIC ACID 500 MG
500 TABLET ORAL DAILY
COMMUNITY

## 2020-12-10 NOTE — PROGRESS NOTES
[facility]  [facility]LE[facility]GR[facility]        HPI:   This is a 79year old female coming in for Patient presents with:  Obstructive Sleep Apnea (ANNABELLE)      HPI:   Present for sleep therapy review. States that sleep is going well.    Reuben Aden ml/min 7/26/2017   • Essential hypertension    • Gout 3/12/2013   • Inflammatory disease of breast 12/29/2015   • Other specified abnormal findings of blood chemistry 6/1/2015   • Pain in thoracic spine 12/2/2016   • Posterior vitreous detachment 6/11/2018 hypothyroidism     Hypercholesterolemia     Benign hypertension      REVIEW OF SYSTEMS:   Review of Systems   Constitutional: Negative. HENT: Negative. Eyes: Negative. Respiratory: Negative. Cardiovascular: Negative.     Musculoskeletal: Fabrice Sox behavior is normal. Judgment and thought content normal.   Nursing note and vitals reviewed. ASSESSMENT AND PLAN:   1. Obstructive sleep apnea (adult) (pediatric)    Patient Instructions   Continue sleep therapy.    Follow-up in 6 months - sooner if ne worsening or changing symptoms. Parent is to call with any side effects or complications from the treatments as a result of today.        Taylor Vega, HARJINDER  12/10/2020  1:30 PM

## 2021-01-01 RX ORDER — DOCUSATE SODIUM 100 MG/1
CAPSULE, LIQUID FILLED ORAL
Qty: 60 CAPSULE | Refills: 0 | OUTPATIENT
Start: 2021-01-01

## 2021-01-20 ENCOUNTER — TELEPHONE (OUTPATIENT)
Dept: FAMILY MEDICINE CLINIC | Facility: CLINIC | Age: 71
End: 2021-01-20

## 2021-01-20 NOTE — TELEPHONE ENCOUNTER
pt has lab appt 1/21/21.  was positive for Covid on 1/5/21. has been quarantining since then. has no symptoms.  said yes on TS for being exposed within month

## 2021-01-20 NOTE — TELEPHONE ENCOUNTER
Spoke with patient's  Abelardo Hollingsworth as I received a note on him regarding his upcoming appointments. Abelardo Hollingsworth tested positive for covid on 1/2/21 after positive exposure on 12/26. Abelardo Hollingsworth remained asymptomatic and greater than 14 days has passed.

## 2021-01-21 ENCOUNTER — LAB ENCOUNTER (OUTPATIENT)
Dept: LAB | Age: 71
End: 2021-01-21
Attending: FAMILY MEDICINE
Payer: MEDICARE

## 2021-01-21 DIAGNOSIS — M10.9 GOUT, UNSPECIFIED: ICD-10-CM

## 2021-01-21 DIAGNOSIS — I12.9 HYPERTENSIVE CHRONIC KIDNEY DISEASE W STG 1-4/UNSP CHR KDNY: ICD-10-CM

## 2021-01-21 DIAGNOSIS — N17.9 ACUTE KIDNEY FAILURE (HCC): ICD-10-CM

## 2021-01-21 DIAGNOSIS — E66.3 OVERWEIGHT (BMI 25.0-29.9): ICD-10-CM

## 2021-01-21 DIAGNOSIS — N18.30 CHRONIC KIDNEY DISEASE, STAGE 3 (HCC): ICD-10-CM

## 2021-01-21 LAB
ALBUMIN SERPL-MCNC: 4 G/DL (ref 3.4–5)
ANION GAP SERPL CALC-SCNC: 5 MMOL/L (ref 0–18)
BILIRUB UR QL STRIP.AUTO: NEGATIVE
BUN BLD-MCNC: 23 MG/DL (ref 7–18)
BUN/CREAT SERPL: 21.9 (ref 10–20)
CALCIUM BLD-MCNC: 9.4 MG/DL (ref 8.5–10.1)
CHLORIDE SERPL-SCNC: 108 MMOL/L (ref 98–112)
CO2 SERPL-SCNC: 26 MMOL/L (ref 21–32)
COLOR UR AUTO: YELLOW
CREAT BLD-MCNC: 1.05 MG/DL
DEPRECATED RDW RBC AUTO: 47 FL (ref 35.1–46.3)
ERYTHROCYTE [DISTWIDTH] IN BLOOD BY AUTOMATED COUNT: 13.2 % (ref 11–15)
GLUCOSE BLD-MCNC: 105 MG/DL (ref 70–99)
GLUCOSE UR STRIP.AUTO-MCNC: NEGATIVE MG/DL
HAV IGM SER QL: 2.3 MG/DL (ref 1.6–2.6)
HCT VFR BLD AUTO: 41.4 %
HGB BLD-MCNC: 13.1 G/DL
KETONES UR STRIP.AUTO-MCNC: NEGATIVE MG/DL
MCH RBC QN AUTO: 30.5 PG (ref 26–34)
MCHC RBC AUTO-ENTMCNC: 31.6 G/DL (ref 31–37)
MCV RBC AUTO: 96.3 FL
NITRITE UR QL STRIP.AUTO: NEGATIVE
OSMOLALITY SERPL CALC.SUM OF ELEC: 292 MOSM/KG (ref 275–295)
PATIENT FASTING Y/N/NP: YES
PH UR STRIP.AUTO: 5 [PH] (ref 4.5–8)
PHOSPHATE SERPL-MCNC: 2.7 MG/DL (ref 2.5–4.9)
PLATELET # BLD AUTO: 169 10(3)UL (ref 150–450)
POTASSIUM SERPL-SCNC: 4.2 MMOL/L (ref 3.5–5.1)
PROT UR STRIP.AUTO-MCNC: NEGATIVE MG/DL
PTH-INTACT SERPL-MCNC: 52.4 PG/ML (ref 18.5–88)
RBC # BLD AUTO: 4.3 X10(6)UL
RBC UR QL AUTO: NEGATIVE
SODIUM SERPL-SCNC: 139 MMOL/L (ref 136–145)
SP GR UR STRIP.AUTO: 1.02 (ref 1–1.03)
URATE SERPL-MCNC: 4.4 MG/DL
UROBILINOGEN UR STRIP.AUTO-MCNC: <2 MG/DL
WBC # BLD AUTO: 6.4 X10(3) UL (ref 4–11)

## 2021-01-21 PROCEDURE — 83970 ASSAY OF PARATHORMONE: CPT

## 2021-01-21 PROCEDURE — 80048 BASIC METABOLIC PNL TOTAL CA: CPT

## 2021-01-21 PROCEDURE — 83735 ASSAY OF MAGNESIUM: CPT

## 2021-01-21 PROCEDURE — 82040 ASSAY OF SERUM ALBUMIN: CPT

## 2021-01-21 PROCEDURE — 81001 URINALYSIS AUTO W/SCOPE: CPT

## 2021-01-21 PROCEDURE — 85027 COMPLETE CBC AUTOMATED: CPT

## 2021-01-21 PROCEDURE — 84100 ASSAY OF PHOSPHORUS: CPT

## 2021-01-21 PROCEDURE — 84550 ASSAY OF BLOOD/URIC ACID: CPT

## 2021-01-21 PROCEDURE — 36415 COLL VENOUS BLD VENIPUNCTURE: CPT

## 2021-03-12 DIAGNOSIS — Z23 NEED FOR VACCINATION: ICD-10-CM

## 2021-04-21 ENCOUNTER — TELEPHONE (OUTPATIENT)
Dept: FAMILY MEDICINE CLINIC | Facility: CLINIC | Age: 71
End: 2021-04-21

## 2021-04-21 NOTE — TELEPHONE ENCOUNTER
LM for patient to return call.   Will need an appt for an acute visit if having concerns and wanting a referral.

## 2021-04-22 ENCOUNTER — OFFICE VISIT (OUTPATIENT)
Dept: FAMILY MEDICINE CLINIC | Facility: CLINIC | Age: 71
End: 2021-04-22
Payer: MEDICARE

## 2021-04-22 VITALS
HEIGHT: 60.5 IN | WEIGHT: 181.38 LBS | SYSTOLIC BLOOD PRESSURE: 130 MMHG | OXYGEN SATURATION: 98 % | RESPIRATION RATE: 16 BRPM | BODY MASS INDEX: 34.69 KG/M2 | TEMPERATURE: 97 F | HEART RATE: 75 BPM | DIASTOLIC BLOOD PRESSURE: 78 MMHG

## 2021-04-22 DIAGNOSIS — Z86.69 HISTORY OF SCIATICA: ICD-10-CM

## 2021-04-22 DIAGNOSIS — M25.552 ACUTE HIP PAIN, LEFT: Primary | ICD-10-CM

## 2021-04-22 PROCEDURE — 99213 OFFICE O/P EST LOW 20 MIN: CPT | Performed by: NURSE PRACTITIONER

## 2021-04-22 NOTE — PROGRESS NOTES
HPI:    Patient ID: Trini Iris is a 79year old female. HPI     Patient is present with complaints of left sciatica pain that started again about 3 weeks ago. Usually walks daily, but not able to the past couple of days.    Has been in physical the Normal appearance. HENT:      Head: Normocephalic and atraumatic. Cardiovascular:      Rate and Rhythm: Normal rate and regular rhythm. Pulses: Normal pulses. Musculoskeletal:      Lumbar back: No swelling. Normal range of motion.  Negative right

## 2021-07-06 ENCOUNTER — TELEPHONE (OUTPATIENT)
Dept: FAMILY MEDICINE CLINIC | Facility: CLINIC | Age: 71
End: 2021-07-06

## 2021-07-06 NOTE — TELEPHONE ENCOUNTER
Spoke with patient regarding recall and sent information via [employer].  Patient states she prefers to use her old machine and will send a [employer] message later with the type of machine so she can continue to get supplies

## 2021-07-07 ENCOUNTER — PATIENT MESSAGE (OUTPATIENT)
Dept: FAMILY MEDICINE CLINIC | Facility: CLINIC | Age: 71
End: 2021-07-07

## 2021-07-07 NOTE — TELEPHONE ENCOUNTER
From: Barney Sher  To: HARJINDER Nguyen  Sent: 7/7/2021 10:21 AM CDT  Subject: Other    Hi, this is Barney Sher. We spoke yesterday morning about the recall on my CPAP machine.  As we discussed, I am going to use my old CPAP machine, instead

## 2021-07-12 ENCOUNTER — OFFICE VISIT (OUTPATIENT)
Dept: FAMILY MEDICINE CLINIC | Facility: CLINIC | Age: 71
End: 2021-07-12
Payer: MEDICARE

## 2021-07-12 VITALS
DIASTOLIC BLOOD PRESSURE: 86 MMHG | OXYGEN SATURATION: 96 % | RESPIRATION RATE: 18 BRPM | SYSTOLIC BLOOD PRESSURE: 142 MMHG | BODY MASS INDEX: 33.96 KG/M2 | TEMPERATURE: 99 F | WEIGHT: 173 LBS | HEIGHT: 60 IN | HEART RATE: 95 BPM

## 2021-07-12 DIAGNOSIS — G47.33 OBSTRUCTIVE SLEEP APNEA (ADULT) (PEDIATRIC): Primary | ICD-10-CM

## 2021-07-12 PROBLEM — M75.121 NONTRAUMATIC COMPLETE TEAR OF RIGHT ROTATOR CUFF: Status: ACTIVE | Noted: 2020-11-30

## 2021-07-12 PROBLEM — N28.9 RENAL INSUFFICIENCY SYNDROME: Status: ACTIVE | Noted: 2020-12-10

## 2021-07-12 PROCEDURE — 99214 OFFICE O/P EST MOD 30 MIN: CPT | Performed by: NURSE PRACTITIONER

## 2021-07-16 NOTE — PATIENT INSTRUCTIONS
Continue sleep therapy. Get an inline HEPA filter  Follow-up in 6 months - sooner if needed.      Advised if still with sleep apnea and not using CPAP has a  7 fold increase in risk of heart attack, stroke, abnormal heart rhythm  and death,  increased ris

## 2021-07-16 NOTE — PROGRESS NOTES
[facility] [place]  SLEEP PROGRESS NOTE        HPI:   This is a 70year old female coming in for Patient presents with:  Obstructive Sleep Apnea (ANNABELLE)      HPI:     Patient is present for recheck on her sleep therapy.   States she is using the ma 30-59 ml/min ([facility] [address] 7/26/2017   • Essential hypertension    • Gout 3/12/2013   • Inflammatory disease of breast 12/29/2015   • Other specified abnormal findings of blood chemistry 6/1/2015   • Pain in thoracic spine 12/2/2016   • Posterior vitreous detachme hypertension     Nontraumatic complete tear of right rotator cuff     Renal insufficiency syndrome      REVIEW OF SYSTEMS:   Review of Systems   Constitutional: Negative. HENT: Negative. Eyes: Negative. Respiratory: Negative.     Cardiovascular: Ne back: Normal range of motion and neck supple. Lymphadenopathy:      Cervical: No cervical adenopathy. Skin:     General: Skin is warm and dry. Neurological:      Mental Status: She is alert and oriented to person, place, and time.    Psychiatric: mask and humidifier  chamber changed every 6 month  with the Durable medical equipment provider. Continue with Prescription management of sleep apnea with PAP therapy. Independent interpretation of sleep download as defined above.          Meds & Re

## 2021-08-10 RX ORDER — LEVOTHYROXINE SODIUM 0.07 MG/1
TABLET ORAL
Qty: 30 TABLET | Refills: 3 | Status: SHIPPED | OUTPATIENT
Start: 2021-08-10 | End: 2022-02-02

## 2021-08-10 NOTE — TELEPHONE ENCOUNTER
Future appt:     Your appointments     Date & Time Appointment Department [facility])    Aug 23, 2021 10:30 AM CDT Laboratory Visit with POLO Rudolph [facility] ([facility] Ref [facility])        Oct 08, 2021 11:00 AM CDT Medicare Annual Well Visit with ESTHER

## 2021-08-23 ENCOUNTER — LABORATORY ENCOUNTER (OUTPATIENT)
Dept: LAB | Age: 71
End: 2021-08-23
Attending: INTERNAL MEDICINE
Payer: MEDICARE

## 2021-08-23 DIAGNOSIS — I10 ESSENTIAL HYPERTENSION: ICD-10-CM

## 2021-08-23 DIAGNOSIS — M10.9 GOUT: ICD-10-CM

## 2021-08-23 DIAGNOSIS — E66.3 OVERWEIGHT (BMI 25.0-29.9): ICD-10-CM

## 2021-08-23 DIAGNOSIS — N17.9 ACUTE KIDNEY FAILURE (HCC): ICD-10-CM

## 2021-08-23 DIAGNOSIS — I12.9 HYPERTENSIVE CHRONIC KIDNEY DISEASE W STG 1-4/UNSP CHR KDNY: ICD-10-CM

## 2021-08-23 DIAGNOSIS — N18.30 CHRONIC KIDNEY DISEASE, STAGE 3 (HCC): ICD-10-CM

## 2021-08-23 LAB
ALBUMIN SERPL-MCNC: 3.7 G/DL (ref 3.4–5)
ANION GAP SERPL CALC-SCNC: 3 MMOL/L (ref 0–18)
BILIRUB UR QL STRIP.AUTO: NEGATIVE
BUN BLD-MCNC: 21 MG/DL (ref 7–18)
CALCIUM BLD-MCNC: 9.1 MG/DL (ref 8.5–10.1)
CHLORIDE SERPL-SCNC: 108 MMOL/L (ref 98–112)
CLARITY UR REFRACT.AUTO: CLEAR
CO2 SERPL-SCNC: 28 MMOL/L (ref 21–32)
COLOR UR AUTO: YELLOW
CREAT BLD-MCNC: 0.9 MG/DL
ERYTHROCYTE [DISTWIDTH] IN BLOOD BY AUTOMATED COUNT: 13.3 %
GLUCOSE BLD-MCNC: 103 MG/DL (ref 70–99)
GLUCOSE UR STRIP.AUTO-MCNC: NEGATIVE MG/DL
HAV IGM SER QL: 2.4 MG/DL (ref 1.6–2.6)
HCT VFR BLD AUTO: 38.2 %
HGB BLD-MCNC: 11.9 G/DL
KETONES UR STRIP.AUTO-MCNC: NEGATIVE MG/DL
MCH RBC QN AUTO: 30.1 PG (ref 26–34)
MCHC RBC AUTO-ENTMCNC: 31.2 G/DL (ref 31–37)
MCV RBC AUTO: 96.7 FL
NITRITE UR QL STRIP.AUTO: NEGATIVE
OSMOLALITY SERPL CALC.SUM OF ELEC: 291 MOSM/KG (ref 275–295)
PATIENT FASTING Y/N/NP: YES
PH UR STRIP.AUTO: 5 [PH] (ref 5–8)
PHOSPHATE SERPL-MCNC: 2.4 MG/DL (ref 2.5–4.9)
PLATELET # BLD AUTO: 189 10(3)UL (ref 150–450)
POTASSIUM SERPL-SCNC: 3.8 MMOL/L (ref 3.5–5.1)
PROT UR STRIP.AUTO-MCNC: NEGATIVE MG/DL
PTH-INTACT SERPL-MCNC: 56.2 PG/ML (ref 18.5–88)
RBC # BLD AUTO: 3.95 X10(6)UL
RBC UR QL AUTO: NEGATIVE
SODIUM SERPL-SCNC: 139 MMOL/L (ref 136–145)
SP GR UR STRIP.AUTO: 1.02 (ref 1–1.03)
URATE SERPL-MCNC: 4.1 MG/DL
UROBILINOGEN UR STRIP.AUTO-MCNC: <2 MG/DL
VIT D+METAB SERPL-MCNC: 42.6 NG/ML (ref 30–100)
WBC # BLD AUTO: 9.5 X10(3) UL (ref 4–11)

## 2021-08-23 PROCEDURE — 84550 ASSAY OF BLOOD/URIC ACID: CPT

## 2021-08-23 PROCEDURE — 84100 ASSAY OF PHOSPHORUS: CPT

## 2021-08-23 PROCEDURE — 82040 ASSAY OF SERUM ALBUMIN: CPT

## 2021-08-23 PROCEDURE — 85027 COMPLETE CBC AUTOMATED: CPT

## 2021-08-23 PROCEDURE — 80048 BASIC METABOLIC PNL TOTAL CA: CPT

## 2021-08-23 PROCEDURE — 36415 COLL VENOUS BLD VENIPUNCTURE: CPT

## 2021-08-23 PROCEDURE — 83735 ASSAY OF MAGNESIUM: CPT

## 2021-08-23 PROCEDURE — 82306 VITAMIN D 25 HYDROXY: CPT

## 2021-08-23 PROCEDURE — 81001 URINALYSIS AUTO W/SCOPE: CPT

## 2021-08-23 PROCEDURE — 83970 ASSAY OF PARATHORMONE: CPT

## 2021-08-30 ENCOUNTER — OFFICE VISIT (OUTPATIENT)
Dept: FAMILY MEDICINE CLINIC | Facility: CLINIC | Age: 71
End: 2021-08-30
Payer: MEDICARE

## 2021-08-30 VITALS
RESPIRATION RATE: 16 BRPM | HEIGHT: 60 IN | BODY MASS INDEX: 33.38 KG/M2 | HEART RATE: 88 BPM | DIASTOLIC BLOOD PRESSURE: 84 MMHG | WEIGHT: 170 LBS | OXYGEN SATURATION: 96 % | SYSTOLIC BLOOD PRESSURE: 136 MMHG | TEMPERATURE: 98 F

## 2021-08-30 DIAGNOSIS — M54.6 ACUTE BILATERAL THORACIC BACK PAIN: ICD-10-CM

## 2021-08-30 DIAGNOSIS — N18.31 STAGE 3A CHRONIC KIDNEY DISEASE (HCC): ICD-10-CM

## 2021-08-30 DIAGNOSIS — K86.89 PANCREATIC MASS: Primary | ICD-10-CM

## 2021-08-30 PROCEDURE — 1111F DSCHRG MED/CURRENT MED MERGE: CPT | Performed by: FAMILY MEDICINE

## 2021-08-30 PROCEDURE — 99214 OFFICE O/P EST MOD 30 MIN: CPT | Performed by: FAMILY MEDICINE

## 2021-08-30 RX ORDER — HYDROCODONE BITARTRATE AND ACETAMINOPHEN 5; 325 MG/1; MG/1
1-2 TABLET ORAL EVERY 8 HOURS PRN
COMMUNITY
Start: 2021-08-28 | End: 2021-09-09

## 2021-08-30 RX ORDER — ONDANSETRON 4 MG/1
4 TABLET, ORALLY DISINTEGRATING ORAL EVERY 8 HOURS PRN
COMMUNITY
Start: 2021-08-28 | End: 2021-09-14 | Stop reason: SDUPTHER

## 2021-08-30 RX ORDER — RIVAROXABAN 15 MG/1
15 TABLET, FILM COATED ORAL 2 TIMES DAILY
COMMUNITY
Start: 2021-08-28 | End: 2021-09-10

## 2021-08-31 NOTE — PROGRESS NOTES
[facility]    Diagnosis: Pancreatic/Liver Masses     Treatment History: Pending     Visit Diagnosis:  Pancreatic mass  (primary encounter diagnosis)  Personal history of malignant neoplasm of pancreas   Abnormal finding of b XARELTO 15 MG Oral Tab, Take 15 mg by mouth 2 (two) times daily. , Disp: , Rfl:   LEVOTHYROXINE SODIUM 75 MCG Oral Tab, TAKE 1 TABLET(75 MCG) BY MOUTH DAILY, Disp: 30 tablet, Rfl: 3  ascorbic acid 500 MG Oral Tab, Take 500 mg by mouth daily. , Disp: , Rfl: file      Years of education: Not on file      Highest education level: Not on file    Tobacco Use      Smoking status: Never Smoker      Smokeless tobacco: Never Used    Vaping Use      Vaping Use: Never used    Substance and Sexual Activity      Alcohol without acute appearing abnormality in the abdomen or pelvis. Pathology: n/a    Assessment and Plan:  71F with a PMH of possible pancreatitis, ANNABELLE, hypothyroidism and HTN was referred by Dr. Michael Dunn for a pancreatic mass.     Pancreatic mass, hepatic les

## 2021-08-31 NOTE — PROGRESS NOTES
Chief Complaint:   Patient presents with:  ER F/U      HPI:   This is a 70year old female coming in for ER follow up     Patient with intractable pain in back ; also recent difficulty of abdominal fullness and decrease appetite.      Patient had CT in ER s Medications   Medication Sig Dispense Refill   • HYDROcodone-acetaminophen 5-325 MG Oral Tab Take 1-2 tablets by mouth every 8 (eight) hours as needed. • ondansetron 4 MG Oral Tablet Dispersible Take 4 mg by mouth every 8 (eight) hours as needed.      • Temp 97.6 °F (36.4 °C) (Temporal)   Resp 16   Ht 5' (1.524 m)   Wt 170 lb (77.1 kg)   SpO2 96%   BMI 33.20 kg/m²  Estimated body mass index is 33.2 kg/m² as calculated from the following:    Height as of this encounter: 5' (1.524 m).     Weight as of this e hypertension     Nontraumatic complete tear of right rotator cuff     Renal insufficiency syndrome      Deonte [place] MD  8/30/2021  8:05 PM

## 2021-09-01 ENCOUNTER — OFFICE VISIT (OUTPATIENT)
Dept: HEMATOLOGY/ONCOLOGY | Facility: HOSPITAL | Age: 71
End: 2021-09-01
Attending: INTERNAL MEDICINE
Payer: MEDICARE

## 2021-09-01 VITALS
OXYGEN SATURATION: 97 % | WEIGHT: 166.38 LBS | SYSTOLIC BLOOD PRESSURE: 137 MMHG | RESPIRATION RATE: 18 BRPM | DIASTOLIC BLOOD PRESSURE: 81 MMHG | BODY MASS INDEX: 33 KG/M2 | TEMPERATURE: 97 F | HEART RATE: 90 BPM

## 2021-09-01 DIAGNOSIS — Z85.07 PERSONAL HISTORY OF MALIGNANT NEOPLASM OF PANCREAS: ICD-10-CM

## 2021-09-01 DIAGNOSIS — R79.9 ABNORMAL FINDING OF BLOOD CHEMISTRY, UNSPECIFIED: ICD-10-CM

## 2021-09-01 DIAGNOSIS — K86.89 PANCREATIC MASS: Primary | ICD-10-CM

## 2021-09-01 DIAGNOSIS — D64.9 NORMOCYTIC ANEMIA: ICD-10-CM

## 2021-09-01 PROCEDURE — 99205 OFFICE O/P NEW HI 60 MIN: CPT | Performed by: INTERNAL MEDICINE

## 2021-09-01 RX ORDER — HYDROCODONE BITARTRATE AND ACETAMINOPHEN 10; 325 MG/1; MG/1
1-2 TABLET ORAL EVERY 4 HOURS PRN
Qty: 120 TABLET | Refills: 0 | Status: SHIPPED | OUTPATIENT
Start: 2021-09-01 | End: 2021-09-23

## 2021-09-02 ENCOUNTER — TELEPHONE (OUTPATIENT)
Dept: HEMATOLOGY/ONCOLOGY | Facility: HOSPITAL | Age: 71
End: 2021-09-02

## 2021-09-02 NOTE — TELEPHONE ENCOUNTER
Patient was told by Cassandra Rosales to call back today with a procedure date of 9/7/21 at 700 AM please call patient to discuss matter.

## 2021-09-02 NOTE — TELEPHONE ENCOUNTER
Returned the call. Let her know everything is in order. Questions addressed, and emotional support offered.

## 2021-09-04 ENCOUNTER — LAB ENCOUNTER (OUTPATIENT)
Dept: LAB | Facility: HOSPITAL | Age: 71
End: 2021-09-04
Attending: INTERNAL MEDICINE
Payer: MEDICARE

## 2021-09-04 DIAGNOSIS — Z11.59 ENCOUNTER FOR SCREENING FOR OTHER VIRAL DISEASES: ICD-10-CM

## 2021-09-04 DIAGNOSIS — Z01.818 PREOP EXAMINATION: ICD-10-CM

## 2021-09-05 LAB — SARS-COV-2 RNA RESP QL NAA+PROBE: NOT DETECTED

## 2021-09-07 ENCOUNTER — NURSE ONLY (OUTPATIENT)
Dept: LAB | Facility: HOSPITAL | Age: 71
End: 2021-09-07
Attending: INTERNAL MEDICINE
Payer: MEDICARE

## 2021-09-07 ENCOUNTER — HOSPITAL ENCOUNTER (OUTPATIENT)
Dept: CT IMAGING | Facility: HOSPITAL | Age: 71
Discharge: HOME | End: 2021-09-07
Attending: INTERNAL MEDICINE
Payer: MEDICARE

## 2021-09-07 VITALS
RESPIRATION RATE: 18 BRPM | HEIGHT: 59 IN | DIASTOLIC BLOOD PRESSURE: 70 MMHG | SYSTOLIC BLOOD PRESSURE: 127 MMHG | HEART RATE: 71 BPM | BODY MASS INDEX: 32.46 KG/M2 | OXYGEN SATURATION: 98 % | WEIGHT: 161 LBS | TEMPERATURE: 98 F

## 2021-09-07 DIAGNOSIS — K86.89 PANCREATIC MASS: ICD-10-CM

## 2021-09-07 DIAGNOSIS — K86.89 PANCREATIC MASS: Primary | ICD-10-CM

## 2021-09-07 LAB
INR BLD: 0.94 (ref 0.89–1.11)
PSA SERPL DL<=0.01 NG/ML-MCNC: 12.8 SECONDS (ref 12.2–14.5)

## 2021-09-07 PROCEDURE — 99152 MOD SED SAME PHYS/QHP 5/>YRS: CPT | Performed by: INTERNAL MEDICINE

## 2021-09-07 PROCEDURE — 85610 PROTHROMBIN TIME: CPT

## 2021-09-07 PROCEDURE — 88307 TISSUE EXAM BY PATHOLOGIST: CPT | Performed by: INTERNAL MEDICINE

## 2021-09-07 PROCEDURE — 47000 NEEDLE BIOPSY OF LIVER PERQ: CPT | Performed by: INTERNAL MEDICINE

## 2021-09-07 PROCEDURE — 36415 COLL VENOUS BLD VENIPUNCTURE: CPT

## 2021-09-07 PROCEDURE — 88341 IMHCHEM/IMCYTCHM EA ADD ANTB: CPT | Performed by: INTERNAL MEDICINE

## 2021-09-07 PROCEDURE — 88342 IMHCHEM/IMCYTCHM 1ST ANTB: CPT | Performed by: INTERNAL MEDICINE

## 2021-09-07 PROCEDURE — 77012 CT SCAN FOR NEEDLE BIOPSY: CPT | Performed by: INTERNAL MEDICINE

## 2021-09-07 RX ORDER — SODIUM CHLORIDE 9 MG/ML
INJECTION, SOLUTION INTRAVENOUS CONTINUOUS
Status: DISCONTINUED | OUTPATIENT
Start: 2021-09-07 | End: 2021-09-09

## 2021-09-07 RX ORDER — MIDAZOLAM HYDROCHLORIDE 1 MG/ML
1 INJECTION INTRAMUSCULAR; INTRAVENOUS EVERY 5 MIN PRN
Status: ACTIVE | OUTPATIENT
Start: 2021-09-07 | End: 2021-09-07

## 2021-09-07 RX ORDER — NALOXONE HYDROCHLORIDE 0.4 MG/ML
80 INJECTION, SOLUTION INTRAMUSCULAR; INTRAVENOUS; SUBCUTANEOUS AS NEEDED
Status: DISCONTINUED | OUTPATIENT
Start: 2021-09-07 | End: 2021-09-09

## 2021-09-07 RX ORDER — MIDAZOLAM HYDROCHLORIDE 1 MG/ML
INJECTION INTRAMUSCULAR; INTRAVENOUS
Status: COMPLETED
Start: 2021-09-07 | End: 2021-09-07

## 2021-09-07 RX ORDER — FLUMAZENIL 0.1 MG/ML
0.2 INJECTION, SOLUTION INTRAVENOUS AS NEEDED
Status: DISCONTINUED | OUTPATIENT
Start: 2021-09-07 | End: 2021-09-09

## 2021-09-07 RX ADMIN — MIDAZOLAM HYDROCHLORIDE 1 MG: 1 INJECTION INTRAMUSCULAR; INTRAVENOUS at 08:17:00

## 2021-09-07 RX ADMIN — MIDAZOLAM HYDROCHLORIDE 0.5 MG: 1 INJECTION INTRAMUSCULAR; INTRAVENOUS at 08:21:00

## 2021-09-07 RX ADMIN — SODIUM CHLORIDE: 9 INJECTION, SOLUTION INTRAVENOUS at 08:00:00

## 2021-09-07 NOTE — IMAGING NOTE
Pt post CT liver lesion biopsy. Tolerated procedure and sedation well. Denies any pain. Biospy site RUQ CDI with tegaderm. Alert and oriented. Report called to [facility] in [address] and bed assigned. Friend to pick pt up [phone].

## 2021-09-08 ENCOUNTER — NURSE NAVIGATOR ENCOUNTER (OUTPATIENT)
Dept: HEMATOLOGY/ONCOLOGY | Facility: HOSPITAL | Age: 71
End: 2021-09-08

## 2021-09-08 ENCOUNTER — HOSPITAL ENCOUNTER (OUTPATIENT)
Dept: NUCLEAR MEDICINE | Facility: HOSPITAL | Age: 71
Discharge: HOME | End: 2021-09-08
Attending: INTERNAL MEDICINE
Payer: MEDICARE

## 2021-09-08 ENCOUNTER — NURSE ONLY (OUTPATIENT)
Dept: HEMATOLOGY/ONCOLOGY | Facility: HOSPITAL | Age: 71
End: 2021-09-08
Attending: INTERNAL MEDICINE
Payer: MEDICARE

## 2021-09-08 DIAGNOSIS — K86.89 PANCREATIC MASS: ICD-10-CM

## 2021-09-08 PROCEDURE — 36415 COLL VENOUS BLD VENIPUNCTURE: CPT

## 2021-09-08 PROCEDURE — 82962 GLUCOSE BLOOD TEST: CPT

## 2021-09-08 PROCEDURE — 78815 PET IMAGE W/CT SKULL-THIGH: CPT | Performed by: INTERNAL MEDICINE

## 2021-09-08 NOTE — PROGRESS NOTES
Next generation genomic sequencing order sent in online to [facility]; paired blood sample sent to [facility] for xT via [employer]; second blood sample sent to [facility] for xG hereditary cancer testing via [employer].  Biopsy specimen collected 09/07/2021 to be tested once re

## 2021-09-09 ENCOUNTER — OFFICE VISIT (OUTPATIENT)
Dept: HEMATOLOGY/ONCOLOGY | Facility: HOSPITAL | Age: 71
End: 2021-09-09
Attending: INTERNAL MEDICINE
Payer: MEDICARE

## 2021-09-09 VITALS
BODY MASS INDEX: 34 KG/M2 | HEART RATE: 96 BPM | TEMPERATURE: 98 F | OXYGEN SATURATION: 96 % | WEIGHT: 167.5 LBS | DIASTOLIC BLOOD PRESSURE: 81 MMHG | SYSTOLIC BLOOD PRESSURE: 172 MMHG | RESPIRATION RATE: 18 BRPM

## 2021-09-09 DIAGNOSIS — G89.3 NEOPLASM RELATED PAIN: Primary | ICD-10-CM

## 2021-09-09 DIAGNOSIS — C25.9 MALIGNANT NEOPLASM OF PANCREAS, UNSPECIFIED LOCATION OF MALIGNANCY (HCC): Primary | ICD-10-CM

## 2021-09-09 PROBLEM — C25.7 MALIGNANT NEOPLASM OF OTHER PARTS OF PANCREAS (HCC): Status: ACTIVE | Noted: 2021-01-01

## 2021-09-09 PROBLEM — Z51.5 PALLIATIVE CARE BY SPECIALIST: Status: ACTIVE | Noted: 2021-09-09

## 2021-09-09 PROBLEM — Z51.5 PALLIATIVE CARE BY SPECIALIST: Status: ACTIVE | Noted: 2021-01-01

## 2021-09-09 PROBLEM — C25.7 MALIGNANT NEOPLASM OF OTHER PARTS OF PANCREAS (HCC): Status: ACTIVE | Noted: 2021-09-09

## 2021-09-09 PROCEDURE — 99214 OFFICE O/P EST MOD 30 MIN: CPT | Performed by: INTERNAL MEDICINE

## 2021-09-09 PROCEDURE — 99215 OFFICE O/P EST HI 40 MIN: CPT | Performed by: NURSE PRACTITIONER

## 2021-09-09 RX ORDER — NALOXONE HYDROCHLORIDE 4 MG/.1ML
4 SPRAY, METERED NASAL AS NEEDED
Qty: 1 KIT | Refills: 0 | Status: SHIPPED | OUTPATIENT
Start: 2021-09-09

## 2021-09-09 RX ORDER — MORPHINE SULFATE 15 MG/1
15 TABLET, FILM COATED, EXTENDED RELEASE ORAL EVERY 12 HOURS SCHEDULED
Qty: 60 TABLET | Refills: 0 | Status: SHIPPED | OUTPATIENT
Start: 2021-09-09 | End: 2021-10-14

## 2021-09-09 NOTE — CONSULTS
Palliative Care Consult Note     Patient Name: Reina Romo   YOB: 1950   Medical Record Number: [id number]   CSN: [id number]   Date of visit: 9/9/2021     Reason for Consult: Referred by Dr. Chris Winters for Symptom management and goals of thoracic spine 12/2/2016   • Posterior vitreous detachment 6/11/2018   • Restless legs syndrome 12/14/2015   • Sleep apnea 2/16/2013   • Vitreous degeneration 2/4/2015   • Vitreous hemorrhage, left eye ([address] 10/7/2020     Surgical History:  Past Surgical H OR Take 1 tablet by mouth daily. Review of Systems:  General:  Fatigue. Feels well. Respiratory:  Denies SOB, denies cough  Cardiac:  Denies chest pain, heart palpitations  Abdomen:  Denies constipation, diarrhea. Denies pain.     Psych:  No Palliative Nurse Practitioner

## 2021-09-09 NOTE — PROGRESS NOTES
THE [facility] Hematology and Oncology Clinic Note    Diagnosis: Metastatic Pancreatic Cancer     Treatment History:  1.  Gemcitabine + Abraxane: pending     Visit Diagnosis:  Malignant neoplasm of pancreas, unspecified location of malignancy ([address]  (primary encoun imaging correlation suggested.     · 9/8/21: PET/CT: CONCLUSION:  Focal enlargement of the pancreatic body with associated increased radiotracer uptake and some relative sparing centrally likely represents the known pancreatic mass with some central necrosi by mouth 2 (two) times daily.  (Patient not taking: Reported on 2021 ), Disp: , Rfl:     [] Midazolam HCl (VERSED) 2 MG/2ML injection 1 mg, 1 mg, Intravenous, Q5 Min PRN, Kathie George MD, 0.5 mg at 21 3222  [] fentaNYL citrate (BAZAN prostate=70's   • Stroke Mother    • Diabetes Paternal Grandfather        Physical Exam  Height: --  Weight: 76 kg (167 lb 8 oz) (09/09 1352)  BSA (Calculated - sq m): --  Pulse: 96 (09/09 1352)  BP: 172/81 (09/09 1352)  Temp: 97.8 °F (36.6 °C) (09/09 1352 favored   · We discussed that treatment for her disease is palliative and curative intent is unlikely. We discussed in the absence of treatment, I would expect a prognosis of < 6 months.  We discussed chemotherapy options of mFOLFIRINOX, [place]/Abraxane and Ge

## 2021-09-11 ENCOUNTER — LAB ENCOUNTER (OUTPATIENT)
Dept: LAB | Facility: HOSPITAL | Age: 71
End: 2021-09-11
Attending: INTERNAL MEDICINE
Payer: MEDICARE

## 2021-09-11 DIAGNOSIS — C25.9 PANCREATIC CANCER (HCC): ICD-10-CM

## 2021-09-13 ENCOUNTER — DOCUMENTATION ONLY (OUTPATIENT)
Dept: HEMATOLOGY/ONCOLOGY | Facility: HOSPITAL | Age: 71
End: 2021-09-13

## 2021-09-13 LAB — SARS-COV-2 RNA RESP QL NAA+PROBE: NOT DETECTED

## 2021-09-14 ENCOUNTER — OFFICE VISIT (OUTPATIENT)
Dept: HEMATOLOGY/ONCOLOGY | Facility: HOSPITAL | Age: 71
End: 2021-09-14
Attending: INTERNAL MEDICINE
Payer: MEDICARE

## 2021-09-14 ENCOUNTER — HOSPITAL ENCOUNTER (OUTPATIENT)
Dept: INTERVENTIONAL RADIOLOGY/VASCULAR | Facility: HOSPITAL | Age: 71
Discharge: HOME | End: 2021-09-14
Attending: INTERNAL MEDICINE | Admitting: INTERNAL MEDICINE
Payer: MEDICARE

## 2021-09-14 VITALS
OXYGEN SATURATION: 96 % | SYSTOLIC BLOOD PRESSURE: 122 MMHG | RESPIRATION RATE: 15 BRPM | TEMPERATURE: 98 F | DIASTOLIC BLOOD PRESSURE: 64 MMHG | HEART RATE: 88 BPM

## 2021-09-14 DIAGNOSIS — C25.9 MALIGNANT NEOPLASM OF PANCREAS, UNSPECIFIED LOCATION OF MALIGNANCY (HCC): ICD-10-CM

## 2021-09-14 DIAGNOSIS — C25.9 PANCREATIC CANCER (HCC): Primary | ICD-10-CM

## 2021-09-14 DIAGNOSIS — C25.9 MALIGNANT NEOPLASM OF PANCREAS, UNSPECIFIED LOCATION OF MALIGNANCY (HCC): Primary | ICD-10-CM

## 2021-09-14 DIAGNOSIS — Z71.9 ENCOUNTER FOR EDUCATION: ICD-10-CM

## 2021-09-14 PROBLEM — M79.2 NEUROPATHIC PAIN: Status: ACTIVE | Noted: 2021-08-13

## 2021-09-14 PROBLEM — G58.8 INTERCOSTAL NEURALGIA: Status: ACTIVE | Noted: 2021-01-01

## 2021-09-14 PROBLEM — M79.2 NEUROPATHIC PAIN: Status: ACTIVE | Noted: 2021-01-01

## 2021-09-14 PROBLEM — G58.8 INTERCOSTAL NEURALGIA: Status: ACTIVE | Noted: 2021-08-13

## 2021-09-14 LAB — INR: 1.1 (ref 0.8–1.3)

## 2021-09-14 PROCEDURE — 99215 OFFICE O/P EST HI 40 MIN: CPT | Performed by: NURSE PRACTITIONER

## 2021-09-14 PROCEDURE — 77001 FLUOROGUIDE FOR VEIN DEVICE: CPT

## 2021-09-14 PROCEDURE — 36561 INSERT TUNNELED CV CATH: CPT

## 2021-09-14 PROCEDURE — 85610 PROTHROMBIN TIME: CPT

## 2021-09-14 PROCEDURE — 76937 US GUIDE VASCULAR ACCESS: CPT

## 2021-09-14 PROCEDURE — 99152 MOD SED SAME PHYS/QHP 5/>YRS: CPT

## 2021-09-14 PROCEDURE — 0JH60WZ INSERTION OF TOTALLY IMPLANTABLE VASCULAR ACCESS DEVICE INTO CHEST SUBCUTANEOUS TISSUE AND FASCIA, OPEN APPROACH: ICD-10-PCS | Performed by: RADIOLOGY

## 2021-09-14 RX ORDER — VANCOMYCIN HYDROCHLORIDE 1 G/20ML
INJECTION, POWDER, LYOPHILIZED, FOR SOLUTION INTRAVENOUS
Status: COMPLETED
Start: 2021-09-14 | End: 2021-09-14

## 2021-09-14 RX ORDER — PROCHLORPERAZINE MALEATE 10 MG
10 TABLET ORAL EVERY 6 HOURS PRN
Qty: 30 TABLET | Refills: 3 | Status: SHIPPED | OUTPATIENT
Start: 2021-09-14 | End: 2021-11-23

## 2021-09-14 RX ORDER — LIDOCAINE HYDROCHLORIDE AND EPINEPHRINE 10; 10 MG/ML; UG/ML
INJECTION, SOLUTION INFILTRATION; PERINEURAL
Status: COMPLETED
Start: 2021-09-14 | End: 2021-09-14

## 2021-09-14 RX ORDER — LIDOCAINE HYDROCHLORIDE 10 MG/ML
INJECTION, SOLUTION INFILTRATION; PERINEURAL
Status: COMPLETED
Start: 2021-09-14 | End: 2021-09-14

## 2021-09-14 RX ORDER — ONDANSETRON HYDROCHLORIDE 8 MG/1
8 TABLET, FILM COATED ORAL EVERY 8 HOURS PRN
Qty: 30 TABLET | Refills: 3 | Status: SHIPPED | OUTPATIENT
Start: 2021-09-14 | End: 2021-10-22

## 2021-09-14 RX ORDER — CEFAZOLIN SODIUM 1 G/3ML
INJECTION, POWDER, FOR SOLUTION INTRAMUSCULAR; INTRAVENOUS
Status: COMPLETED
Start: 2021-09-14 | End: 2021-09-14

## 2021-09-14 RX ORDER — HEPARIN SODIUM 5000 [USP'U]/ML
INJECTION, SOLUTION INTRAVENOUS; SUBCUTANEOUS
Status: COMPLETED
Start: 2021-09-14 | End: 2021-09-14

## 2021-09-14 RX ORDER — SODIUM CHLORIDE 9 MG/ML
INJECTION, SOLUTION INTRAVENOUS CONTINUOUS
Status: DISCONTINUED | OUTPATIENT
Start: 2021-09-14 | End: 2021-09-14

## 2021-09-14 RX ORDER — MIDAZOLAM HYDROCHLORIDE 1 MG/ML
INJECTION INTRAMUSCULAR; INTRAVENOUS
Status: COMPLETED
Start: 2021-09-14 | End: 2021-09-14

## 2021-09-14 RX ADMIN — SODIUM CHLORIDE: 9 INJECTION, SOLUTION INTRAVENOUS at 10:30:00

## 2021-09-14 NOTE — PROGRESS NOTES
Pt received s/p Port a cath insertion with Dr. Lanie Jamison. Right upper chest dressing CDI. Bedrest completed. Pt eating and drinking without difficulty. Discharge instructions given to pt and pt's .  All questions answered and both pt and pt's  ve

## 2021-09-14 NOTE — PROCEDURES
[facility]  Procedure Note    [address] [facility] Patient Status:  Outpatient in a Bed    1950 MRN [id number]   [facility] Attending Mina Osborne MD   Hosp Day # 0 PCP Luis Mcfarlane MD     Procedure: r

## 2021-09-14 NOTE — PRE-SEDATION ASSESSMENT
[facility]  IR Pre-Procedure Sedation Assessment    H&P in Dr Alfredito Hewitt office visit progress note dated 9/9/21 reviewed, no changes. History of snoring or sleep or apnea?    Yes    History of previous problems with anesthesia or sedation  No    Physica

## 2021-09-14 NOTE — PROGRESS NOTES
IV Chemotherapy Education    Learner:  Patient and Spouse    Barriers / Limitations:  None    Chemotherapy education goals:  · Learn the drug names  · Administration schedule  · Routes of administration  · Treatment setting    Drug names:  Abraxane + gemci addressed. We discussed self care techniques, symptom management, fluid, diet, and activities. Chemotherapy Consent Form signed by the patient.     I spent a total of 60 minutes with the patient, 100 % of that time was spent counseling patient regarding

## 2021-09-16 ENCOUNTER — OFFICE VISIT (OUTPATIENT)
Dept: HEMATOLOGY/ONCOLOGY | Facility: HOSPITAL | Age: 71
End: 2021-09-16
Attending: INTERNAL MEDICINE
Payer: MEDICARE

## 2021-09-16 ENCOUNTER — SOCIAL WORK SERVICES (OUTPATIENT)
Dept: HEMATOLOGY/ONCOLOGY | Facility: HOSPITAL | Age: 71
End: 2021-09-16

## 2021-09-16 VITALS
HEIGHT: 58.43 IN | TEMPERATURE: 96 F | HEART RATE: 88 BPM | OXYGEN SATURATION: 96 % | RESPIRATION RATE: 16 BRPM | SYSTOLIC BLOOD PRESSURE: 142 MMHG | BODY MASS INDEX: 35 KG/M2 | WEIGHT: 169 LBS | DIASTOLIC BLOOD PRESSURE: 81 MMHG

## 2021-09-16 DIAGNOSIS — C25.7 MALIGNANT NEOPLASM OF OTHER PARTS OF PANCREAS (HCC): Primary | ICD-10-CM

## 2021-09-16 LAB
ALBUMIN SERPL-MCNC: 3.4 G/DL (ref 3.4–5)
ALBUMIN/GLOB SERPL: 0.9 {RATIO} (ref 1–2)
ALP LIVER SERPL-CCNC: 61 U/L
ALT SERPL-CCNC: 35 U/L
ANION GAP SERPL CALC-SCNC: 6 MMOL/L (ref 0–18)
AST SERPL-CCNC: 25 U/L (ref 15–37)
BASOPHILS # BLD AUTO: 0.03 X10(3) UL (ref 0–0.2)
BASOPHILS NFR BLD AUTO: 0.4 %
BILIRUB SERPL-MCNC: 0.4 MG/DL (ref 0.1–2)
BUN BLD-MCNC: 12 MG/DL (ref 7–18)
CALCIUM BLD-MCNC: 9 MG/DL (ref 8.5–10.1)
CANCER AG19-9 SERPL-ACNC: ABNORMAL U/ML (ref ?–37)
CHLORIDE SERPL-SCNC: 106 MMOL/L (ref 98–112)
CO2 SERPL-SCNC: 26 MMOL/L (ref 21–32)
CREAT BLD-MCNC: 0.74 MG/DL
EOSINOPHIL # BLD AUTO: 0.22 X10(3) UL (ref 0–0.7)
EOSINOPHIL NFR BLD AUTO: 2.9 %
ERYTHROCYTE [DISTWIDTH] IN BLOOD BY AUTOMATED COUNT: 13.2 %
GLOBULIN PLAS-MCNC: 3.7 G/DL (ref 2.8–4.4)
GLUCOSE BLD-MCNC: 128 MG/DL (ref 70–99)
HCT VFR BLD AUTO: 32.5 %
HGB BLD-MCNC: 10.5 G/DL
IMM GRANULOCYTES # BLD AUTO: 0.03 X10(3) UL (ref 0–1)
IMM GRANULOCYTES NFR BLD: 0.4 %
LYMPHOCYTES # BLD AUTO: 0.99 X10(3) UL (ref 1–4)
LYMPHOCYTES NFR BLD AUTO: 13.1 %
MCH RBC QN AUTO: 29.9 PG (ref 26–34)
MCHC RBC AUTO-ENTMCNC: 32.3 G/DL (ref 31–37)
MCV RBC AUTO: 92.6 FL
MONOCYTES # BLD AUTO: 0.72 X10(3) UL (ref 0.1–1)
MONOCYTES NFR BLD AUTO: 9.5 %
NEUTROPHILS # BLD AUTO: 5.55 X10 (3) UL (ref 1.5–7.7)
NEUTROPHILS # BLD AUTO: 5.55 X10(3) UL (ref 1.5–7.7)
NEUTROPHILS NFR BLD AUTO: 73.7 %
OSMOLALITY SERPL CALC.SUM OF ELEC: 287 MOSM/KG (ref 275–295)
PLATELET # BLD AUTO: 182 10(3)UL (ref 150–450)
POTASSIUM SERPL-SCNC: 3.3 MMOL/L (ref 3.5–5.1)
PROT SERPL-MCNC: 7.1 G/DL (ref 6.4–8.2)
RBC # BLD AUTO: 3.51 X10(6)UL
SODIUM SERPL-SCNC: 138 MMOL/L (ref 136–145)
WBC # BLD AUTO: 7.5 X10(3) UL (ref 4–11)

## 2021-09-16 PROCEDURE — 86301 IMMUNOASSAY TUMOR CA 19-9: CPT

## 2021-09-16 PROCEDURE — 96413 CHEMO IV INFUSION 1 HR: CPT

## 2021-09-16 PROCEDURE — 85025 COMPLETE CBC W/AUTO DIFF WBC: CPT

## 2021-09-16 PROCEDURE — 96375 TX/PRO/DX INJ NEW DRUG ADDON: CPT

## 2021-09-16 PROCEDURE — 80053 COMPREHEN METABOLIC PANEL: CPT

## 2021-09-16 PROCEDURE — 96417 CHEMO IV INFUS EACH ADDL SEQ: CPT

## 2021-09-16 NOTE — PROGRESS NOTES
met with Patient and  Tani Kessler in treatment room for introduction and role explanation. Patient shared that she is doing well, and staying positive.  She reports that she knows her prognosis is not good and that she will pass due to this,

## 2021-09-16 NOTE — PROGRESS NOTES
Pt here for C 1 D 1 Abraxane/gemzar.   Arrives Ambulating independently, accompanied by Self and Spouse           Pregnancy screening: Not applicable    Modifications in dose or schedule: No     Frequency of blood return and site check throughout [facility]

## 2021-09-17 ENCOUNTER — TELEPHONE (OUTPATIENT)
Dept: HEMATOLOGY/ONCOLOGY | Facility: HOSPITAL | Age: 71
End: 2021-09-17

## 2021-09-17 NOTE — PROGRESS NOTES
Nutrition Consultation    Patient Name: Juliano Mcfarland  YOB: 1950  Medical Record Number: [id number]   Account Number: [de-identified]  Dietitian: Alexandrea De Leon RD, BRIAN    Date of visit: 9/16/2021    Diet Rx: high protein/calorie as juan manuel unresponsive, repeat dose in other nostril 2-5 minutes after first dose., Disp: 1 kit, Rfl: 0  •  HYDROcodone-acetaminophen  MG Oral Tab, Take 1-2 tablets by mouth every 4 (four) hours as needed for Pain., Disp: 120 tablet, Rfl: 0  •  LEVOTHYROXINE S you for allowing me to participate in the care of Ever Novak. RD offered support/encouragement and will continue to follow throughout tx.

## 2021-09-17 NOTE — TELEPHONE ENCOUNTER
Toxicities: C1 D1 albumin-bound Paclitaxel/Gemcitabine on 9/16/2021    Lewis Bolton reports she is feeling well. She has already done her 30 minutes of yoga.  I encouraged her to please call the office if she is not feeling well or she has any questions or concern

## 2021-09-23 ENCOUNTER — OFFICE VISIT (OUTPATIENT)
Dept: HEMATOLOGY/ONCOLOGY | Facility: HOSPITAL | Age: 71
End: 2021-09-23
Attending: INTERNAL MEDICINE
Payer: MEDICARE

## 2021-09-23 VITALS
WEIGHT: 165.38 LBS | RESPIRATION RATE: 16 BRPM | BODY MASS INDEX: 34.25 KG/M2 | HEIGHT: 58.43 IN | SYSTOLIC BLOOD PRESSURE: 165 MMHG | OXYGEN SATURATION: 99 % | TEMPERATURE: 97 F | HEART RATE: 98 BPM | DIASTOLIC BLOOD PRESSURE: 87 MMHG

## 2021-09-23 DIAGNOSIS — K59.01 SLOW TRANSIT CONSTIPATION: ICD-10-CM

## 2021-09-23 DIAGNOSIS — R53.83 FATIGUE DUE TO TREATMENT: ICD-10-CM

## 2021-09-23 DIAGNOSIS — C25.7 MALIGNANT NEOPLASM OF OTHER PARTS OF PANCREAS (HCC): Primary | ICD-10-CM

## 2021-09-23 DIAGNOSIS — R11.2 CINV (CHEMOTHERAPY-INDUCED NAUSEA AND VOMITING): ICD-10-CM

## 2021-09-23 DIAGNOSIS — G89.3 NEOPLASM RELATED PAIN: Primary | ICD-10-CM

## 2021-09-23 DIAGNOSIS — K86.89 PANCREATIC MASS: ICD-10-CM

## 2021-09-23 DIAGNOSIS — T45.1X5A CINV (CHEMOTHERAPY-INDUCED NAUSEA AND VOMITING): ICD-10-CM

## 2021-09-23 DIAGNOSIS — K59.03 DRUG-INDUCED CONSTIPATION: ICD-10-CM

## 2021-09-23 LAB
BASOPHILS # BLD AUTO: 0.04 X10(3) UL (ref 0–0.2)
BASOPHILS NFR BLD AUTO: 0.5 %
EOSINOPHIL # BLD AUTO: 0.07 X10(3) UL (ref 0–0.7)
EOSINOPHIL NFR BLD AUTO: 1 %
ERYTHROCYTE [DISTWIDTH] IN BLOOD BY AUTOMATED COUNT: 13.2 %
HCT VFR BLD AUTO: 31.1 %
HGB BLD-MCNC: 10.4 G/DL
IMM GRANULOCYTES # BLD AUTO: 0.06 X10(3) UL (ref 0–1)
IMM GRANULOCYTES NFR BLD: 0.8 %
LYMPHOCYTES # BLD AUTO: 1.3 X10(3) UL (ref 1–4)
LYMPHOCYTES NFR BLD AUTO: 17.8 %
MCH RBC QN AUTO: 31 PG (ref 26–34)
MCHC RBC AUTO-ENTMCNC: 33.4 G/DL (ref 31–37)
MCV RBC AUTO: 92.8 FL
MONOCYTES # BLD AUTO: 0.6 X10(3) UL (ref 0.1–1)
MONOCYTES NFR BLD AUTO: 8.2 %
NEUTROPHILS # BLD AUTO: 5.25 X10 (3) UL (ref 1.5–7.7)
NEUTROPHILS # BLD AUTO: 5.25 X10(3) UL (ref 1.5–7.7)
NEUTROPHILS NFR BLD AUTO: 71.7 %
PLATELET # BLD AUTO: 134 10(3)UL (ref 150–450)
RBC # BLD AUTO: 3.35 X10(6)UL
WBC # BLD AUTO: 7.3 X10(3) UL (ref 4–11)

## 2021-09-23 PROCEDURE — 85025 COMPLETE CBC W/AUTO DIFF WBC: CPT

## 2021-09-23 PROCEDURE — 96413 CHEMO IV INFUSION 1 HR: CPT

## 2021-09-23 PROCEDURE — 99215 OFFICE O/P EST HI 40 MIN: CPT | Performed by: CLINICAL NURSE SPECIALIST

## 2021-09-23 PROCEDURE — 96417 CHEMO IV INFUS EACH ADDL SEQ: CPT

## 2021-09-23 PROCEDURE — 99214 OFFICE O/P EST MOD 30 MIN: CPT | Performed by: NURSE PRACTITIONER

## 2021-09-23 PROCEDURE — 96375 TX/PRO/DX INJ NEW DRUG ADDON: CPT

## 2021-09-23 RX ORDER — HYDROCODONE BITARTRATE AND ACETAMINOPHEN 10; 325 MG/1; MG/1
1-2 TABLET ORAL EVERY 4 HOURS PRN
Qty: 90 TABLET | Refills: 0 | Status: SHIPPED | OUTPATIENT
Start: 2021-09-23 | End: 2021-11-12

## 2021-09-23 RX ORDER — DOCUSATE SODIUM 100 MG/1
100 CAPSULE, LIQUID FILLED ORAL 2 TIMES DAILY PRN
Qty: 60 CAPSULE | Refills: 0 | Status: SHIPPED | OUTPATIENT
Start: 2021-09-23 | End: 2021-12-06

## 2021-09-23 NOTE — PROGRESS NOTES
Cancer Center Progress Note    Patient Name: Edgar Kruse   YOB: 1950   Medical Record Number: [id number]   CSN: [id number]   Date of visit: 9/23/2021  Provider: HARJINDER Quezada  Referring Physician: No ref.  provider found    Pr metastatic disease cannot be excluded. 3. Remainder chronic appearing findings as described above without acute appearing abnormality in the abdomen or pelvis.     · 9/7/21: Right Lobe of Liver Biopsy:  On immunohistochemistry, the tumor cells are positive f 1024)        Medications:    Current Outpatient Medications:   •  Prochlorperazine Maleate (COMPAZINE) 10 mg tablet, Take 1 tablet (10 mg total) by mouth every 6 (six) hours as needed for Nausea., Disp: 30 tablet, Rfl: 3  •  ondansetron (ZOFRAN) 8 MG table rhythm  Abdomen:  Non-distended, normoactive bowel sounds, soft,nontender, no hepatosplenomegaly.   Extremities:  No edema, no tenderness  Neuro:  CN 2-12 intact    Labs:  Recent Results (from the past 24 hour(s))   CBC W/ DIFFERENTIAL    Collection Time: 0

## 2021-09-23 NOTE — PROGRESS NOTES
Pt here for C1D8.   Arrives Ambulating independently, accompanied by Self           Pregnancy screening: Denies possibility of pregnancy    Modifications in dose or schedule: No     Frequency of blood return and site check throughout administration: Prior t

## 2021-09-23 NOTE — PROGRESS NOTES
Patient presents with: Follow - Up: APN assessment prior to treatment    Pt is here for treatment; C1 D8 abraxane/gemzar is expected.  First treatment had ups and downs; initially felt well then had a few days with shaking chills, fever with tmax of 100.9,

## 2021-09-24 NOTE — PROGRESS NOTES
Palliative Care Follow Up Note     Patient Name: Edgar Kruse   YOB: 1950   Medical Record Number: [id number]   CSN: [id number]   Date of visit: 9/23/2021     Chief Complaint/Reason for Visit:  Follow up for pain     History of Don blood chemistry 6/1/2015   • Pain in thoracic spine 12/2/2016   • Posterior vitreous detachment 6/11/2018   • Restless legs syndrome 12/14/2015   • Sleep apnea 2/16/2013   • Vitreous degeneration 2/4/2015   • Vitreous hemorrhage, left eye [address] [address] 10/7/2020 • Cyanocobalamin (B-12) 1000 MCG Oral Cap Take 1 tablet by mouth daily. • IRON OR Take 1 tablet by mouth daily. Review of Systems:  General:  Fatigue. Feels well.     Respiratory:  Denies SOB, denies cough  Cardiac:  Denies chest pain,

## 2021-09-27 ENCOUNTER — OFFICE VISIT (OUTPATIENT)
Dept: HEMATOLOGY/ONCOLOGY | Facility: HOSPITAL | Age: 71
End: 2021-09-27
Attending: INTERNAL MEDICINE
Payer: MEDICARE

## 2021-09-27 VITALS
HEART RATE: 95 BPM | TEMPERATURE: 99 F | OXYGEN SATURATION: 98 % | SYSTOLIC BLOOD PRESSURE: 154 MMHG | RESPIRATION RATE: 16 BRPM | DIASTOLIC BLOOD PRESSURE: 86 MMHG

## 2021-09-27 DIAGNOSIS — C25.7 MALIGNANT NEOPLASM OF OTHER PARTS OF PANCREAS (HCC): ICD-10-CM

## 2021-09-27 DIAGNOSIS — L27.0 DRUG RASH: Primary | ICD-10-CM

## 2021-09-27 PROCEDURE — 99215 OFFICE O/P EST HI 40 MIN: CPT | Performed by: NURSE PRACTITIONER

## 2021-09-27 NOTE — PROGRESS NOTES
Cancer Center Progress Note    Patient Name: Ene Leary   YOB: 1950   Medical Record Number: [id number]   CSN: [id number]   Date of visit: 9/27/2021     Chief Complaint/Reason for Visit:  Patient presents with:  Rash: APN assessment - 6/1/2015   • Pain in thoracic spine 12/2/2016   • Posterior vitreous detachment 6/11/2018   • Restless legs syndrome 12/14/2015   • Sleep apnea 2/16/2013   • Vitreous degeneration 2/4/2015   • Vitreous hemorrhage, left eye ([address] 10/7/2020       Surgical Hi file      Minutes of Exercise per Session: Not on file  Stress:       Feeling of Stress : Not on file  Social Connections:       Frequency of Communication with Friends and Family: Not on file      Frequency of Social Gatherings with Friends and Family: No ascorbic acid 500 MG Oral Tab, Take 500 mg by mouth daily. , Disp: , Rfl:   •  Cholecalciferol 50 MCG (2000 UT) Oral Tab, Take 2,000 Units by mouth daily. , Disp: , Rfl:   •  febuxostat 40 MG Oral Tab, Take 1 tablet by mouth daily.   , Disp: , Rfl:   •  Elias starting oral steroids. Pancreatic cancer: started gemcitabine 1000mg/m2 and Abraxane 125mg/m2 on 9/16/2021.      Planned Follow Up: 9/30/2021 for chemotherapy    Risk Level: HIGH pancreatic cancer receiving chemotherapy requiring close monitoring

## 2021-09-27 NOTE — PROGRESS NOTES
Patient presents with:  Rash: APN assessment - sick call    Pt is here for a sick call - rash. Pt was last treated on 9/23/2021 C1 D8 abraxane/gemzar. She reports that itchy rash started over the weekend to face/breasts/trunk/back and legs.  Does have a his

## 2021-09-30 ENCOUNTER — OFFICE VISIT (OUTPATIENT)
Dept: HEMATOLOGY/ONCOLOGY | Facility: HOSPITAL | Age: 71
End: 2021-09-30
Attending: INTERNAL MEDICINE
Payer: MEDICARE

## 2021-09-30 VITALS
WEIGHT: 165 LBS | OXYGEN SATURATION: 95 % | DIASTOLIC BLOOD PRESSURE: 78 MMHG | TEMPERATURE: 97 F | RESPIRATION RATE: 18 BRPM | BODY MASS INDEX: 34.17 KG/M2 | HEIGHT: 58.43 IN | SYSTOLIC BLOOD PRESSURE: 172 MMHG | HEART RATE: 90 BPM

## 2021-09-30 DIAGNOSIS — C25.7 MALIGNANT NEOPLASM OF OTHER PARTS OF PANCREAS (HCC): Primary | ICD-10-CM

## 2021-09-30 LAB
BASOPHILS # BLD AUTO: 0.03 X10(3) UL (ref 0–0.2)
BASOPHILS NFR BLD AUTO: 0.4 %
EOSINOPHIL # BLD AUTO: 0.16 X10(3) UL (ref 0–0.7)
EOSINOPHIL NFR BLD AUTO: 2.2 %
ERYTHROCYTE [DISTWIDTH] IN BLOOD BY AUTOMATED COUNT: 13.2 %
HCT VFR BLD AUTO: 28.3 %
HGB BLD-MCNC: 9.5 G/DL
IMM GRANULOCYTES # BLD AUTO: 0.09 X10(3) UL (ref 0–1)
IMM GRANULOCYTES NFR BLD: 1.2 %
LYMPHOCYTES # BLD AUTO: 1.36 X10(3) UL (ref 1–4)
LYMPHOCYTES NFR BLD AUTO: 18.8 %
MCH RBC QN AUTO: 31.4 PG (ref 26–34)
MCHC RBC AUTO-ENTMCNC: 33.6 G/DL (ref 31–37)
MCV RBC AUTO: 93.4 FL
MONOCYTES # BLD AUTO: 0.54 X10(3) UL (ref 0.1–1)
MONOCYTES NFR BLD AUTO: 7.5 %
NEUTROPHILS # BLD AUTO: 5.04 X10 (3) UL (ref 1.5–7.7)
NEUTROPHILS # BLD AUTO: 5.04 X10(3) UL (ref 1.5–7.7)
NEUTROPHILS NFR BLD AUTO: 69.9 %
PLATELET # BLD AUTO: 119 10(3)UL (ref 150–450)
RBC # BLD AUTO: 3.03 X10(6)UL
WBC # BLD AUTO: 7.2 X10(3) UL (ref 4–11)

## 2021-09-30 PROCEDURE — 96417 CHEMO IV INFUS EACH ADDL SEQ: CPT

## 2021-09-30 PROCEDURE — 85025 COMPLETE CBC W/AUTO DIFF WBC: CPT

## 2021-09-30 PROCEDURE — 96413 CHEMO IV INFUSION 1 HR: CPT

## 2021-09-30 PROCEDURE — 96375 TX/PRO/DX INJ NEW DRUG ADDON: CPT

## 2021-09-30 NOTE — PROGRESS NOTES
Pt here for C1D15.   Arrives Ambulating independently, accompanied by Self           Pregnancy screening: Denies possibility of pregnancy    Modifications in dose or schedule: No     Frequency of blood return and site check throughout administration: Prior

## 2021-10-01 NOTE — PROGRESS NOTES
Nutrition FU Consultation     Patient Name: Salvtaore Brooks  YOB: 1950  Medical Record Number: [id number]      Account Number: [de-identified]  Dietitian: Gayatri Jimenez RD, LDN     Date of visit: 9/30/2021     Diet Rx: high protein/calorie as    Pertinent Labs: noted     Height: 4'10.43\"                       IBW: 95 +/- 10%     WT HX:   09/30/21: 165 lbs  09/16/21: 169 lbs     Estimated Nutrition Needs: 23-25 kcals/kg = 9223-1459 KCALS/d; 1.3 gms protein/kg = 100 gms/d     Assessment/Plan:

## 2021-10-04 ENCOUNTER — LABORATORY ENCOUNTER (OUTPATIENT)
Dept: LAB | Age: 71
End: 2021-10-04
Payer: MEDICARE

## 2021-10-04 DIAGNOSIS — M10.9 GOUT: ICD-10-CM

## 2021-10-04 DIAGNOSIS — N18.30 CHRONIC KIDNEY DISEASE, STAGE 3 (HCC): ICD-10-CM

## 2021-10-04 DIAGNOSIS — I12.9 HYPERTENSIVE CHRONIC KIDNEY DISEASE W STG 1-4/UNSP CHR KDNY: ICD-10-CM

## 2021-10-04 DIAGNOSIS — I10 ESSENTIAL HYPERTENSION: ICD-10-CM

## 2021-10-04 DIAGNOSIS — E66.3 OVERWEIGHT (BMI 25.0-29.9): ICD-10-CM

## 2021-10-04 DIAGNOSIS — N17.9 ACUTE KIDNEY FAILURE (HCC): ICD-10-CM

## 2021-10-04 PROCEDURE — 84156 ASSAY OF PROTEIN URINE: CPT

## 2021-10-04 PROCEDURE — 84100 ASSAY OF PHOSPHORUS: CPT

## 2021-10-04 PROCEDURE — 82040 ASSAY OF SERUM ALBUMIN: CPT

## 2021-10-04 PROCEDURE — 36415 COLL VENOUS BLD VENIPUNCTURE: CPT

## 2021-10-04 PROCEDURE — 80048 BASIC METABOLIC PNL TOTAL CA: CPT

## 2021-10-04 PROCEDURE — 83735 ASSAY OF MAGNESIUM: CPT

## 2021-10-04 PROCEDURE — 85027 COMPLETE CBC AUTOMATED: CPT

## 2021-10-04 PROCEDURE — 82570 ASSAY OF URINE CREATININE: CPT

## 2021-10-04 PROCEDURE — 83970 ASSAY OF PARATHORMONE: CPT

## 2021-10-04 PROCEDURE — 81001 URINALYSIS AUTO W/SCOPE: CPT

## 2021-10-08 ENCOUNTER — OFFICE VISIT (OUTPATIENT)
Dept: FAMILY MEDICINE CLINIC | Facility: CLINIC | Age: 71
End: 2021-10-08
Payer: MEDICARE

## 2021-10-08 VITALS
SYSTOLIC BLOOD PRESSURE: 110 MMHG | RESPIRATION RATE: 16 BRPM | BODY MASS INDEX: 32.89 KG/M2 | HEART RATE: 88 BPM | TEMPERATURE: 98 F | HEIGHT: 58.5 IN | WEIGHT: 161 LBS | DIASTOLIC BLOOD PRESSURE: 60 MMHG

## 2021-10-08 DIAGNOSIS — C25.7 MALIGNANT NEOPLASM OF OTHER PARTS OF PANCREAS (HCC): ICD-10-CM

## 2021-10-08 DIAGNOSIS — Z51.5 PALLIATIVE CARE PATIENT: ICD-10-CM

## 2021-10-08 DIAGNOSIS — Z00.00 ENCOUNTER FOR ANNUAL HEALTH EXAMINATION: Primary | ICD-10-CM

## 2021-10-08 PROCEDURE — G0439 PPPS, SUBSEQ VISIT: HCPCS | Performed by: FAMILY MEDICINE

## 2021-10-08 PROCEDURE — 1111F DSCHRG MED/CURRENT MED MERGE: CPT | Performed by: FAMILY MEDICINE

## 2021-10-08 PROCEDURE — 99213 OFFICE O/P EST LOW 20 MIN: CPT | Performed by: FAMILY MEDICINE

## 2021-10-12 NOTE — PROGRESS NOTES
Chief Complaint:   Patient presents with:  Physical      HPI:   This is a 70year old female coming in for health care check     Patient has started palliative care for pancreatic cancer     Discussed treatment     Counseling given re:  having diffi (A) Negative    WBC Urine 6-10 (A) 0 - 5 /HPF    RBC Urine 0-2 0 - 2 /HPF    Bacteria Urine Rare (A) None Seen /HPF    Squamous Epi.  Cells Many (A) None Seen /HPF    Renal Tubular Epithelial Cells None Seen None Seen /HPF    Transitional Cells None Seen No Use: Never used    Substance and Sexual Activity      Alcohol use: No        Alcohol/week: 0.0 standard drinks      Drug use: No           Current Meds:  Current Outpatient Medications   Medication Sig Dispense Refill   • HYDROcodone-acetaminophen  M : denies dysuria, no urinary frequency , no discharge. MUSCULOSKELETAL:  Denies weakness, muscle aches,   NEUROLOGICAL:  Denies headache, dizziness,   HEMATOLOGIC:  Denies bleeding or bruising. PSYCHIATRIC:  Denies depression or anxiety.   ENDOCRINO There are no barriers to learning. Medical education done. Outcome: Patient verbalizes understanding. Patient is notified to call with any questions, complications, allergies, or worsening or changing symptoms.   Patient is to call with any side effects o

## 2021-10-14 ENCOUNTER — OFFICE VISIT (OUTPATIENT)
Dept: HEMATOLOGY/ONCOLOGY | Facility: HOSPITAL | Age: 71
End: 2021-10-14
Attending: INTERNAL MEDICINE
Payer: MEDICARE

## 2021-10-14 DIAGNOSIS — C25.7 MALIGNANT NEOPLASM OF OTHER PARTS OF PANCREAS (HCC): Primary | ICD-10-CM

## 2021-10-14 DIAGNOSIS — C25.9 MALIGNANT NEOPLASM OF PANCREAS, UNSPECIFIED LOCATION OF MALIGNANCY (HCC): Primary | ICD-10-CM

## 2021-10-14 PROCEDURE — 80053 COMPREHEN METABOLIC PANEL: CPT

## 2021-10-14 PROCEDURE — 85025 COMPLETE CBC W/AUTO DIFF WBC: CPT

## 2021-10-14 PROCEDURE — 99214 OFFICE O/P EST MOD 30 MIN: CPT | Performed by: INTERNAL MEDICINE

## 2021-10-14 PROCEDURE — 86301 IMMUNOASSAY TUMOR CA 19-9: CPT

## 2021-10-14 PROCEDURE — 36591 DRAW BLOOD OFF VENOUS DEVICE: CPT

## 2021-10-14 RX ORDER — MORPHINE SULFATE 15 MG/1
15 TABLET, FILM COATED, EXTENDED RELEASE ORAL EVERY 12 HOURS SCHEDULED
Qty: 60 TABLET | Refills: 0 | Status: SHIPPED | OUTPATIENT
Start: 2021-10-14 | End: 2021-11-12

## 2021-10-14 NOTE — PROGRESS NOTES
THE [facility] Hematology and Oncology Clinic Note    Diagnosis: Metastatic Pancreatic Cancer     Treatment History:  1.  Gemcitabine + Abraxane: 9/16/21-Current    Visit Diagnosis:  Malignant neoplasm of pancreas, unspecified location of malignancy ([facility] [address]  (primary Clinical and imaging correlation suggested.     · 9/8/21: PET/CT: CONCLUSION:  Focal enlargement of the pancreatic body with associated increased radiotracer uptake and some relative sparing centrally likely represents the known pancreatic mass with some ce first dose., Disp: 1 kit, Rfl: 0  LEVOTHYROXINE SODIUM 75 MCG Oral Tab, TAKE 1 TABLET(75 MCG) BY MOUTH DAILY, Disp: 30 tablet, Rfl: 3  ascorbic acid 500 MG Oral Tab, Take 500 mg by mouth daily. , Disp: , Rfl:   Cholecalciferol 50 MCG (2000 UT) Oral Tab, Terry Hernandez 1154  [COMPLETED] PACLitaxel Protein-Bound Part (ABRAXANE) 125 mg/m2 = 215 mg in 43 mL chemo-infusion, 125 mg/m2 (Treatment Plan), Intravenous, Once, Hugh Phillips MD, Stopped at 09/16/21 1241  [COMPLETED] Gemcitabine HCl (GEMZAR) 1,710 mg in sodium chl bilateral   • ROTATOR CUFF REPAIR      left side     Social History    Socioeconomic History      Marital status:     Tobacco Use      Smoking status: Never Smoker      Smokeless tobacco: Never Used    Vaping Use      Vaping Use: Never used    ONEOK abnormality in the abdomen or pelvis. Pathology: n/a    Assessment and Plan:  71F with a PMH of possible pancreatitis, ANNABELLE, hypothyroidism and HTN was referred by Dr. Paul Kim for a pancreatic mass.     Metastatic Pancreatic Adenocarcinoma: Metastatic to

## 2021-10-14 NOTE — PROGRESS NOTES
Patient here for C2 D1 Abraxane/Gemzar. Per Dr. Darren Vera, patient not getting treatment today, patient is switching to Tuesdays. Tuesday appointment scheduled. Patients port needle d/c'd and patient sent home in stable condition.    Education Record    Learner

## 2021-10-19 ENCOUNTER — OFFICE VISIT (OUTPATIENT)
Dept: HEMATOLOGY/ONCOLOGY | Facility: HOSPITAL | Age: 71
End: 2021-10-19
Attending: INTERNAL MEDICINE
Payer: MEDICARE

## 2021-10-19 VITALS
DIASTOLIC BLOOD PRESSURE: 92 MMHG | TEMPERATURE: 97 F | SYSTOLIC BLOOD PRESSURE: 168 MMHG | WEIGHT: 167 LBS | HEART RATE: 87 BPM | OXYGEN SATURATION: 95 % | HEIGHT: 58.43 IN | BODY MASS INDEX: 34.58 KG/M2 | RESPIRATION RATE: 18 BRPM

## 2021-10-19 DIAGNOSIS — C25.7 MALIGNANT NEOPLASM OF OTHER PARTS OF PANCREAS (HCC): Primary | ICD-10-CM

## 2021-10-19 PROCEDURE — 96417 CHEMO IV INFUS EACH ADDL SEQ: CPT

## 2021-10-19 PROCEDURE — 96375 TX/PRO/DX INJ NEW DRUG ADDON: CPT

## 2021-10-19 PROCEDURE — 96413 CHEMO IV INFUSION 1 HR: CPT

## 2021-10-19 PROCEDURE — 96367 TX/PROPH/DG ADDL SEQ IV INF: CPT

## 2021-10-19 NOTE — PROGRESS NOTES
Nutrition FU Consultation     Patient [address] [facility]  YOB: 1950  Medical Record Number: [id number]      Account Number: [de-identified]  Dietitian: Melvin Funez RD, BRIAN     Date of visit: 10/19/2021     Diet Rx: high protein/calorie as    Pertinent Labs: noted     Height: 4'10.43\"                       IBW: 95 +/- 10%     WT HX:   10/19/21: 167 lbs  09/30/21: 165 lbs  09/16/21: 169 lbs     Estimated Nutrition Needs: 23-25 kcals/kg = 3604-7639 KCALS/d; 1.3 gms protein/kg = 100 gms/d

## 2021-10-25 NOTE — PROGRESS NOTES
[facility] Progress Note    Patient Name: Verta Bumpers   YOB: 1950   Medical Record Number: [id number]   CSN: [id number]   Date of visit: 10/26/2021     Chief Complaint/Reason for Visit:  Patient presents with:   Follow - Up: BRENT hargrove thoracic spine 12/2/2016   • Posterior vitreous detachment 6/11/2018   • Restless legs syndrome 12/14/2015   • Sleep apnea 2/16/2013   • Vitreous degeneration 2/4/2015   • Vitreous hemorrhage, left eye ([address] 10/7/2020       Surgical History:  Past Surgical Exercise per Session: Not on file  Stress:       Feeling of Stress : Not on file  Social Connections:       Frequency of Communication with Friends and Family: Not on file      Frequency of Social Gatherings with Friends and Family: Not on file      Attend Rfl: 3  •  ascorbic acid 500 MG Oral Tab, Take 500 mg by mouth daily. , Disp: , Rfl:   •  Cholecalciferol 50 MCG (2000 UT) Oral Tab, Take 2,000 Units by mouth daily. , Disp: , Rfl:   •  febuxostat 40 MG Oral Tab, Take 1 tablet by mouth daily.   , Disp: , Rfl: 10(3)uL    MCV 97.1 80.0 - 100.0 fL    MCH 31.8 26.0 - 34.0 pg    MCHC 32.8 31.0 - 37.0 g/dL    RDW 15.0 %    Neutrophil Absolute Prelim 3.04 1.50 - 7.70 x10 (3) uL    Neutrophil Absolute 3.04 1.50 - 7.70 x10(3) uL    Lymphocyte Absolute 1.01 1.00 - 4.00 x

## 2021-10-26 ENCOUNTER — OFFICE VISIT (OUTPATIENT)
Dept: HEMATOLOGY/ONCOLOGY | Facility: HOSPITAL | Age: 71
End: 2021-10-26
Attending: INTERNAL MEDICINE
Payer: MEDICARE

## 2021-10-26 VITALS
WEIGHT: 162.63 LBS | DIASTOLIC BLOOD PRESSURE: 89 MMHG | RESPIRATION RATE: 18 BRPM | BODY MASS INDEX: 33.68 KG/M2 | SYSTOLIC BLOOD PRESSURE: 169 MMHG | TEMPERATURE: 99 F | HEIGHT: 58.43 IN | HEART RATE: 87 BPM | OXYGEN SATURATION: 98 %

## 2021-10-26 DIAGNOSIS — C25.7 MALIGNANT NEOPLASM OF OTHER PARTS OF PANCREAS (HCC): Primary | ICD-10-CM

## 2021-10-26 PROCEDURE — 85025 COMPLETE CBC W/AUTO DIFF WBC: CPT

## 2021-10-26 PROCEDURE — 96367 TX/PROPH/DG ADDL SEQ IV INF: CPT

## 2021-10-26 PROCEDURE — 96375 TX/PRO/DX INJ NEW DRUG ADDON: CPT

## 2021-10-26 PROCEDURE — 96413 CHEMO IV INFUSION 1 HR: CPT

## 2021-10-26 PROCEDURE — 96417 CHEMO IV INFUS EACH ADDL SEQ: CPT

## 2021-10-26 PROCEDURE — 99215 OFFICE O/P EST HI 40 MIN: CPT | Performed by: NURSE PRACTITIONER

## 2021-10-26 RX ORDER — HYDROCODONE BITARTRATE AND ACETAMINOPHEN 10; 325 MG/1; MG/1
2 TABLET ORAL ONCE
Status: COMPLETED | OUTPATIENT
Start: 2021-10-26 | End: 2021-10-26

## 2021-10-26 RX ORDER — HYDROCODONE BITARTRATE AND ACETAMINOPHEN 10; 325 MG/1; MG/1
2 TABLET ORAL ONCE
Status: CANCELLED
Start: 2021-10-26 | End: 2021-10-26

## 2021-10-26 RX ADMIN — HYDROCODONE BITARTRATE AND ACETAMINOPHEN 2 TABLET: 10; 325 TABLET ORAL at 11:51:00

## 2021-10-26 NOTE — PROGRESS NOTES
Patient presents with: Follow - Up: APN assessment prior to treatment    Pt is here for treatment; C2 D8 abraxane/gemzar is expected. Pt reports that she had a difficult time last week; increased nausea and general feeling on unwell.  At one point she repo

## 2021-10-26 NOTE — PROGRESS NOTES
Pt here for C2D8 Abraxane/Gemzar.   Arrives Ambulating independently, accompanied by Self           Pregnancy screening: Denies possibility of pregnancy    Modifications in dose or schedule: no     Frequency of blood return and site check throughout adminis

## 2021-11-02 ENCOUNTER — OFFICE VISIT (OUTPATIENT)
Dept: HEMATOLOGY/ONCOLOGY | Age: 71
End: 2021-11-02
Attending: INTERNAL MEDICINE
Payer: MEDICARE

## 2021-11-02 ENCOUNTER — OFFICE VISIT (OUTPATIENT)
Dept: HEMATOLOGY/ONCOLOGY | Facility: HOSPITAL | Age: 71
End: 2021-11-02
Attending: INTERNAL MEDICINE
Payer: MEDICARE

## 2021-11-02 VITALS
HEIGHT: 58.43 IN | DIASTOLIC BLOOD PRESSURE: 88 MMHG | RESPIRATION RATE: 16 BRPM | HEART RATE: 97 BPM | SYSTOLIC BLOOD PRESSURE: 156 MMHG | TEMPERATURE: 98 F | WEIGHT: 165.5 LBS | BODY MASS INDEX: 34.27 KG/M2 | OXYGEN SATURATION: 99 %

## 2021-11-02 DIAGNOSIS — C25.7 MALIGNANT NEOPLASM OF OTHER PARTS OF PANCREAS (HCC): Primary | ICD-10-CM

## 2021-11-02 PROCEDURE — 96413 CHEMO IV INFUSION 1 HR: CPT

## 2021-11-02 PROCEDURE — 85025 COMPLETE CBC W/AUTO DIFF WBC: CPT

## 2021-11-02 PROCEDURE — 96375 TX/PRO/DX INJ NEW DRUG ADDON: CPT

## 2021-11-02 PROCEDURE — 96367 TX/PROPH/DG ADDL SEQ IV INF: CPT

## 2021-11-02 PROCEDURE — 96417 CHEMO IV INFUS EACH ADDL SEQ: CPT

## 2021-11-02 NOTE — PROGRESS NOTES
Pt here for C2D15.   Arrives Ambulating independently, accompanied by Self           Pregnancy screening: Denies possibility of pregnancy    Modifications in dose or schedule: No     Frequency of blood return and site check throughout administration: Prior

## 2021-11-02 NOTE — PROGRESS NOTES
Nutrition FU Consultation     Patient [address] [facility]  YOB: 1950  Medical Record Number: [id number]      Account Number: [de-identified]  Dietitian: Claire Minor RD, LDN     Date of visit: 11/2/2021     Diet Rx: high protein/calorie as

## 2021-11-12 DIAGNOSIS — K86.89 PANCREATIC MASS: ICD-10-CM

## 2021-11-12 RX ORDER — HYDROCODONE BITARTRATE AND ACETAMINOPHEN 10; 325 MG/1; MG/1
1-2 TABLET ORAL EVERY 4 HOURS PRN
Qty: 90 TABLET | Refills: 0 | Status: SHIPPED | OUTPATIENT
Start: 2021-11-12 | End: 2022-01-03

## 2021-11-12 RX ORDER — MORPHINE SULFATE 15 MG/1
15 TABLET, FILM COATED, EXTENDED RELEASE ORAL EVERY 12 HOURS SCHEDULED
Qty: 60 TABLET | Refills: 0 | Status: SHIPPED | OUTPATIENT
Start: 2021-11-12 | End: 2021-12-15

## 2021-11-15 ENCOUNTER — HOSPITAL ENCOUNTER (OUTPATIENT)
Dept: CT IMAGING | Facility: HOSPITAL | Age: 71
Discharge: HOME | End: 2021-11-15
Attending: INTERNAL MEDICINE
Payer: MEDICARE

## 2021-11-15 DIAGNOSIS — C25.9 MALIGNANT NEOPLASM OF PANCREAS, UNSPECIFIED LOCATION OF MALIGNANCY (HCC): ICD-10-CM

## 2021-11-15 PROCEDURE — 71260 CT THORAX DX C+: CPT | Performed by: INTERNAL MEDICINE

## 2021-11-15 PROCEDURE — 74170 CT ABD WO CNTRST FLWD CNTRST: CPT | Performed by: INTERNAL MEDICINE

## 2021-11-16 ENCOUNTER — OFFICE VISIT (OUTPATIENT)
Dept: HEMATOLOGY/ONCOLOGY | Facility: HOSPITAL | Age: 71
End: 2021-11-16
Attending: INTERNAL MEDICINE
Payer: MEDICARE

## 2021-11-16 VITALS
RESPIRATION RATE: 18 BRPM | DIASTOLIC BLOOD PRESSURE: 99 MMHG | HEIGHT: 58.43 IN | WEIGHT: 165.38 LBS | OXYGEN SATURATION: 98 % | HEART RATE: 92 BPM | TEMPERATURE: 99 F | SYSTOLIC BLOOD PRESSURE: 175 MMHG | BODY MASS INDEX: 34.25 KG/M2

## 2021-11-16 DIAGNOSIS — C25.7 MALIGNANT NEOPLASM OF OTHER PARTS OF PANCREAS (HCC): Primary | ICD-10-CM

## 2021-11-16 PROCEDURE — 96367 TX/PROPH/DG ADDL SEQ IV INF: CPT

## 2021-11-16 PROCEDURE — 99214 OFFICE O/P EST MOD 30 MIN: CPT | Performed by: INTERNAL MEDICINE

## 2021-11-16 PROCEDURE — 85025 COMPLETE CBC W/AUTO DIFF WBC: CPT

## 2021-11-16 PROCEDURE — 86301 IMMUNOASSAY TUMOR CA 19-9: CPT

## 2021-11-16 PROCEDURE — 96417 CHEMO IV INFUS EACH ADDL SEQ: CPT

## 2021-11-16 PROCEDURE — 96413 CHEMO IV INFUSION 1 HR: CPT

## 2021-11-16 PROCEDURE — 96375 TX/PRO/DX INJ NEW DRUG ADDON: CPT

## 2021-11-16 PROCEDURE — 80053 COMPREHEN METABOLIC PANEL: CPT

## 2021-11-16 NOTE — PROGRESS NOTES
Pt here for C3D1 abraxane/gemzar.   Arrives Ambulating independently, accompanied by Self           Pregnancy screening: Denies possibility of pregnancy    Modifications in dose or schedule: No     Frequency of blood return and site check throughout adminis

## 2021-11-16 NOTE — PROGRESS NOTES
[facility] H[facility]ology [facility] On[facility] Clinic Note    Diagnosis: Metastatic Pancreatic Cancer     Treatment History:  1.  Gemcitabine + Abraxane: 9/16/21-Current    Visit Diagnosis:  Malignant neoplasm of other parts of pancreas ([facility] [address]  (primary encounter diagnosis) correlation suggested.    -9/8/21: PET/CT: CONCLUSION:  Focal enlargement of the pancreatic body with associated increased radiotracer uptake and some relative sparing centrally likely represents the known pancreatic mass with some central necrosis.      TABLET(75 MCG) BY MOUTH DAILY, Disp: 30 tablet, Rfl: 3  ascorbic acid 500 MG Oral Tab, Take 500 mg by mouth daily. , Disp: , Rfl:   Cholecalciferol 50 MCG (2000 UT) Oral Tab, Take 2,000 Units by mouth daily. , Disp: , Rfl:   febuxostat 40 MG Oral Tab, Take 1 Dexamethasone Sodium Phosphate (DECADRON) 10 mg in sodium chloride 0.9% 105 mL IVPB, , Intravenous, Once, Kirstin Segovia, APRN, Stopped at 10/26/21 1206  [COMPLETED] PACLitaxel Protein-Bound Part (ABRAXANE) 200 mg in 40 mL chemo-infusion, 200 mg, [place] Schwertner blood chemistry 6/1/2015   • Pain in thoracic spine 12/2/2016   • Posterior vitreous detachment 6/11/2018   • Restless legs syndrome 12/14/2015   • Sleep apnea 2/16/2013   • Vitreous degeneration 2/4/2015   • Vitreous hemorrhage, left eye ([facility] [address] 10/7/2020 reviewed the imaging  CT CAP 11/15/21  LIVER:  Intrahepatic masses consistent with metastases are again identified.  Based on pre contrast images for better comparison with noncontrast PET/CT performed 9/8/2021, measurements are as follows:  Within the rig the end, she endorsed that she wants to focus on quality of life but would like to try chemotherapy and opted for Gemcitabine + Abraxane. -C3 of [place]/Abraxane  -imaging after C2 shows improvement.  Repeat imaging after C4 or C5  -Mediport  -Palliative care

## 2021-11-17 NOTE — PROGRESS NOTES
Cancer Center Progress Note    Patient Name: Constanza Drake   YOB: 1950   Medical Record Number: [id number]   CSN: [id number]   Date of visit: 11/23/2021  Provider: HARJINDER Enamorado  Referring Physician: Mitchell Lane L findings as described above without acute appearing abnormality in the abdomen or pelvis.     · 9/7/21: Right Lobe of Liver Biopsy:  On immunohistochemistry, the tumor cells are positive for CK7 and negative for CK20, CDX2, TTF-1 and PAX8.  The histologic f 2910)          Medications:    Current Outpatient Medications:   •  morphINE ER 15 MG Oral Tab CR, Take 1 tablet (15 mg total) by mouth every 12 (twelve) hours. , Disp: 60 tablet, Rfl: 0  •  HYDROcodone-acetaminophen  MG Oral Tab, Take 1-2 tablets by Regular rate and rhythm  Abdomen:  Non-distended, normoactive bowel sounds, soft,nontender, no hepatosplenomegaly.   Extremities:  No edema, no tenderness  Neuro:  CN 2-12 intact    ECOG 0-1    Labs:  Recent Results (from the past 24 hour(s))   CBC W/ DIFFE

## 2021-11-23 ENCOUNTER — OFFICE VISIT (OUTPATIENT)
Dept: HEMATOLOGY/ONCOLOGY | Facility: HOSPITAL | Age: 71
End: 2021-11-23
Attending: INTERNAL MEDICINE
Payer: MEDICARE

## 2021-11-23 VITALS
BODY MASS INDEX: 34.17 KG/M2 | DIASTOLIC BLOOD PRESSURE: 95 MMHG | HEART RATE: 85 BPM | RESPIRATION RATE: 18 BRPM | TEMPERATURE: 98 F | OXYGEN SATURATION: 98 % | WEIGHT: 165 LBS | HEIGHT: 58.43 IN | SYSTOLIC BLOOD PRESSURE: 184 MMHG

## 2021-11-23 DIAGNOSIS — C25.9 MALIGNANT NEOPLASM OF PANCREAS, UNSPECIFIED LOCATION OF MALIGNANCY (HCC): ICD-10-CM

## 2021-11-23 DIAGNOSIS — C25.7 MALIGNANT NEOPLASM OF OTHER PARTS OF PANCREAS (HCC): Primary | ICD-10-CM

## 2021-11-23 DIAGNOSIS — K59.01 SLOW TRANSIT CONSTIPATION: ICD-10-CM

## 2021-11-23 DIAGNOSIS — R11.0 CHEMOTHERAPY-INDUCED NAUSEA: Primary | ICD-10-CM

## 2021-11-23 DIAGNOSIS — G89.3 PAIN, NEOPLASM-RELATED: ICD-10-CM

## 2021-11-23 DIAGNOSIS — T45.1X5A CHEMOTHERAPY-INDUCED NAUSEA: Primary | ICD-10-CM

## 2021-11-23 PROCEDURE — 96375 TX/PRO/DX INJ NEW DRUG ADDON: CPT

## 2021-11-23 PROCEDURE — 85025 COMPLETE CBC W/AUTO DIFF WBC: CPT

## 2021-11-23 PROCEDURE — 96367 TX/PROPH/DG ADDL SEQ IV INF: CPT

## 2021-11-23 PROCEDURE — 99215 OFFICE O/P EST HI 40 MIN: CPT | Performed by: CLINICAL NURSE SPECIALIST

## 2021-11-23 PROCEDURE — 96413 CHEMO IV INFUSION 1 HR: CPT

## 2021-11-23 PROCEDURE — 96417 CHEMO IV INFUS EACH ADDL SEQ: CPT

## 2021-11-23 RX ORDER — PROCHLORPERAZINE MALEATE 10 MG
10 TABLET ORAL EVERY 6 HOURS PRN
Qty: 30 TABLET | Refills: 3 | Status: SHIPPED | OUTPATIENT
Start: 2021-11-23

## 2021-11-23 RX ORDER — ONDANSETRON HYDROCHLORIDE 8 MG/1
8 TABLET, FILM COATED ORAL EVERY 8 HOURS PRN
Qty: 30 TABLET | Refills: 3 | Status: SHIPPED | OUTPATIENT
Start: 2021-11-23 | End: 2022-01-11

## 2021-11-23 NOTE — PROGRESS NOTES
Pt here for C3D8 abraxane/gemzar.   Arrives Ambulating independently, accompanied by Self           Pregnancy screening: Denies possibility of pregnancy    Modifications in dose or schedule: No     Frequency of blood return and site check throughout adminis

## 2021-11-23 NOTE — PROGRESS NOTES
Patient presents with: Follow - Up: apn assessment    Patient here for [facility] apn assessment prior to treatment. Patient is doing well- stating she had some constipation after treatment but this has now resolved.   She states she has no appetite but is eati

## 2021-11-29 NOTE — PROGRESS NOTES
THE [facility] Hematology and Oncology Clinic Note    Diagnosis: Metastatic Pancreatic Cancer     Treatment History:  1.  Gemcitabine + Abraxane: 9/16/21-Current    Visit Diagnosis:  Malignant neoplasm of other parts of pancreas ([facility] [address]  (primary encounter diagnosis) correlation suggested.    -9/8/21: PET/CT: CONCLUSION:  Focal enlargement of the pancreatic body with associated increased radiotracer uptake and some relative sparing centrally likely represents the known pancreatic mass with some central necrosis.      MG/0.1ML Nasal Liquid, 4 mg by Nasal route as needed.  If patient remains unresponsive, repeat dose in other nostril 2-5 minutes after first dose., Disp: 1 kit, Rfl: 0  LEVOTHYROXINE SODIUM 75 MCG Oral Tab, TAKE 1 TABLET(75 MCG) BY MOUTH DAILY, Disp: 30 tab Intravenous, Once, Tere Elias MD, Stopped at 11/16/21 1309  [COMPLETED] Gemcitabine HCl (GEMZAR) 1,710 mg in sodium chloride 0.9% 295 mL chemo-infusion, 1,000 mg/m2 (Treatment Plan Recorded), Intravenous, Once, Tere Elias MD, Stopped at 11/16/2 Restless legs syndrome 12/14/2015   • Sleep apnea 2/16/2013   • Vitreous degeneration 2/4/2015   • Vitreous hemorrhage, left eye ([address] 10/7/2020     Past Surgical History:   Procedure Laterality Date   • CATARACT Left    • FOOT SURGERY      bilateral   • R identified.  Based on pre contrast images for better comparison with noncontrast PET/CT performed 9/8/2021, measurements are as follows:  Within the right lobe along the dome is a 2.2 x 2   vs 2.5 x 2.1 cm mass.  Centrally is a 1.7 x 2.3 vs 2 x 2.7 cm mass opted for Gemcitabine + Abraxane. -C3D15 of [place]/Abraxane  -imaging after C2 shows improvement.  is up. Repeat imaging after C4 or C5.  May need to consider MRCP to assess  rise)  -Mediport  -Palliative care   -Trend   -[place]: Negative

## 2021-11-30 ENCOUNTER — OFFICE VISIT (OUTPATIENT)
Dept: HEMATOLOGY/ONCOLOGY | Facility: HOSPITAL | Age: 71
End: 2021-11-30
Attending: INTERNAL MEDICINE
Payer: MEDICARE

## 2021-11-30 VITALS
HEART RATE: 98 BPM | OXYGEN SATURATION: 95 % | DIASTOLIC BLOOD PRESSURE: 76 MMHG | TEMPERATURE: 97 F | WEIGHT: 164 LBS | BODY MASS INDEX: 33.96 KG/M2 | RESPIRATION RATE: 16 BRPM | HEIGHT: 58.43 IN | SYSTOLIC BLOOD PRESSURE: 182 MMHG

## 2021-11-30 DIAGNOSIS — C25.7 MALIGNANT NEOPLASM OF OTHER PARTS OF PANCREAS (HCC): Primary | ICD-10-CM

## 2021-11-30 PROCEDURE — 96413 CHEMO IV INFUSION 1 HR: CPT

## 2021-11-30 PROCEDURE — 85025 COMPLETE CBC W/AUTO DIFF WBC: CPT

## 2021-11-30 PROCEDURE — 96375 TX/PRO/DX INJ NEW DRUG ADDON: CPT

## 2021-11-30 PROCEDURE — 99213 OFFICE O/P EST LOW 20 MIN: CPT | Performed by: INTERNAL MEDICINE

## 2021-11-30 PROCEDURE — 96367 TX/PROPH/DG ADDL SEQ IV INF: CPT

## 2021-11-30 PROCEDURE — 96417 CHEMO IV INFUS EACH ADDL SEQ: CPT

## 2021-11-30 NOTE — PROGRESS NOTES
Pt here for C3D15 Abraxane/Gemzar.   Arrives Ambulating independently, accompanied by Self           Pregnancy screening: Denies possibility of pregnancy    Modifications in dose or schedule: no     Frequency of blood return and site check throughout admini

## 2021-12-06 RX ORDER — DOCUSATE SODIUM 100 MG/1
100 CAPSULE, LIQUID FILLED ORAL 2 TIMES DAILY PRN
Qty: 60 CAPSULE | Refills: 2 | Status: SHIPPED | OUTPATIENT
Start: 2021-12-06

## 2021-12-14 ENCOUNTER — OFFICE VISIT (OUTPATIENT)
Dept: HEMATOLOGY/ONCOLOGY | Facility: HOSPITAL | Age: 71
End: 2021-12-14
Attending: INTERNAL MEDICINE
Payer: MEDICARE

## 2021-12-14 ENCOUNTER — TELEPHONE (OUTPATIENT)
Dept: HEMATOLOGY/ONCOLOGY | Facility: HOSPITAL | Age: 71
End: 2021-12-14

## 2021-12-14 VITALS
TEMPERATURE: 97 F | SYSTOLIC BLOOD PRESSURE: 170 MMHG | OXYGEN SATURATION: 97 % | BODY MASS INDEX: 34.38 KG/M2 | WEIGHT: 166 LBS | HEIGHT: 58.43 IN | RESPIRATION RATE: 16 BRPM | DIASTOLIC BLOOD PRESSURE: 87 MMHG | HEART RATE: 94 BPM

## 2021-12-14 DIAGNOSIS — C25.7 MALIGNANT NEOPLASM OF OTHER PARTS OF PANCREAS (HCC): Primary | ICD-10-CM

## 2021-12-14 DIAGNOSIS — C25.9 MALIGNANT NEOPLASM OF PANCREAS, UNSPECIFIED LOCATION OF MALIGNANCY (HCC): Primary | ICD-10-CM

## 2021-12-14 PROCEDURE — 96367 TX/PROPH/DG ADDL SEQ IV INF: CPT

## 2021-12-14 PROCEDURE — 99213 OFFICE O/P EST LOW 20 MIN: CPT | Performed by: INTERNAL MEDICINE

## 2021-12-14 PROCEDURE — 96417 CHEMO IV INFUS EACH ADDL SEQ: CPT

## 2021-12-14 PROCEDURE — 96375 TX/PRO/DX INJ NEW DRUG ADDON: CPT

## 2021-12-14 PROCEDURE — 80053 COMPREHEN METABOLIC PANEL: CPT

## 2021-12-14 PROCEDURE — 85025 COMPLETE CBC W/AUTO DIFF WBC: CPT

## 2021-12-14 PROCEDURE — 96413 CHEMO IV INFUSION 1 HR: CPT

## 2021-12-14 PROCEDURE — 86301 IMMUNOASSAY TUMOR CA 19-9: CPT

## 2021-12-14 NOTE — TELEPHONE ENCOUNTER
Spoke with patient face to face. Order given with central scheduling contact information. Tre Mooney MD  P [facility]  Results reviewed. Ca19-9 is trending down. CT CAP ordered.  She does not need oral contrast. Thanks

## 2021-12-14 NOTE — PROGRESS NOTES
Pt here for C4D1.   Arrives Ambulating independently, accompanied by Self           Pregnancy screening: Not applicable    Modifications in dose or schedule: No     Frequency of blood return and site check throughout administration: Prior to administration

## 2021-12-14 NOTE — PROGRESS NOTES
[facility] Hematology and Oncology Clinic Note    Diagnosis: Metastatic Pancreatic Cancer     Treatment History:  1.  Gemcitabine + Abraxane: 9/16/21-Current    Visit Diagnosis:  Malignant neoplasm of other parts of pancreas ([facility] [address]  (primary encounter diagnosis) correlation suggested.    -9/8/21: PET/CT: CONCLUSION:  Focal enlargement of the pancreatic body with associated increased radiotracer uptake and some relative sparing centrally likely represents the known pancreatic mass with some central necrosis.      first dose., Disp: 1 kit, Rfl: 0  LEVOTHYROXINE SODIUM 75 MCG Oral Tab, TAKE 1 TABLET(75 MCG) BY MOUTH DAILY, Disp: 30 tablet, Rfl: 3  ascorbic acid 500 MG Oral Tab, Take 500 mg by mouth daily. , Disp: , Rfl:   Cholecalciferol 50 MCG (2000 UT) Oral Tab, Alex Vlaencia Once, Olya Sands, HARJINDER, Stopped at 11/23/21 1502  [COMPLETED] Gemcitabine HCl (GEMZAR) 1,710 mg in sodium chloride 0.9% 295 mL chemo-infusion, 1,000 mg/m2 (Treatment Plan Recorded), Intravenous, Once, María Larios, HARJINDER, Stopped at 11/23/21 1 legs syndrome 12/14/2015   • Sleep apnea 2/16/2013   • Vitreous degeneration 2/4/2015   • Vitreous hemorrhage, left eye ([address] 10/7/2020     Past Surgical History:   Procedure Laterality Date   • CATARACT Left    • FOOT SURGERY      bilateral   • ROTATOR CU noncontrast PET/CT performed 9/8/2021, measurements are as follows:  Within the right lobe along the dome is a 2.2 x 2   vs 2.5 x 2.1 cm mass.  Centrally is a 1.7 x 2.3 vs 2 x 2.7 cm mass.  Inferiorly low-density mass measures 4.2 x 3.2 vs 3.9 x 2.9 cm.  W after C2 shows improvement.  is up/stable. Repeat imaging after C4. If  is trending down, will opt for CT CAP. If elevated, will check CT Chest and MRCP.    -Mediport  -Palliative care   -Trend   -Tempus: Negative for actionable mutations

## 2021-12-15 RX ORDER — MORPHINE SULFATE 15 MG/1
15 TABLET, FILM COATED, EXTENDED RELEASE ORAL EVERY 12 HOURS SCHEDULED
Qty: 60 TABLET | Refills: 0 | Status: SHIPPED | OUTPATIENT
Start: 2021-12-15 | End: 2022-01-13

## 2021-12-15 NOTE — PROGRESS NOTES
Nutrition FU Consultation     Patient [address] [facility]  YOB: 1950  Medical Record Number: [id number]      Account Number: [de-identified]  Dietitian: Nyla Coto RD, LDN     Date of visit: 12/14/2021     Diet Rx: high protein/calorie as

## 2021-12-21 ENCOUNTER — OFFICE VISIT (OUTPATIENT)
Dept: HEMATOLOGY/ONCOLOGY | Facility: HOSPITAL | Age: 71
End: 2021-12-21
Attending: INTERNAL MEDICINE
Payer: MEDICARE

## 2021-12-21 VITALS
DIASTOLIC BLOOD PRESSURE: 81 MMHG | SYSTOLIC BLOOD PRESSURE: 169 MMHG | OXYGEN SATURATION: 97 % | BODY MASS INDEX: 33.47 KG/M2 | RESPIRATION RATE: 18 BRPM | TEMPERATURE: 98 F | HEIGHT: 58.43 IN | WEIGHT: 161.63 LBS | HEART RATE: 87 BPM

## 2021-12-21 DIAGNOSIS — C25.7 MALIGNANT NEOPLASM OF OTHER PARTS OF PANCREAS (HCC): Primary | ICD-10-CM

## 2021-12-21 PROCEDURE — 96413 CHEMO IV INFUSION 1 HR: CPT

## 2021-12-21 PROCEDURE — 96367 TX/PROPH/DG ADDL SEQ IV INF: CPT

## 2021-12-21 PROCEDURE — 96375 TX/PRO/DX INJ NEW DRUG ADDON: CPT

## 2021-12-21 PROCEDURE — 96417 CHEMO IV INFUS EACH ADDL SEQ: CPT

## 2021-12-21 PROCEDURE — 85025 COMPLETE CBC W/AUTO DIFF WBC: CPT

## 2021-12-21 NOTE — PROGRESS NOTES
Pt here for C4D8 Abraxane/Gemzar.   Arrives Ambulating independently, accompanied by Self           Pregnancy screening: Denies possibility of pregnancy    Modifications in dose or schedule: No     Frequency of blood return and site check throughout adminis

## 2021-12-28 ENCOUNTER — OFFICE VISIT (OUTPATIENT)
Dept: HEMATOLOGY/ONCOLOGY | Facility: HOSPITAL | Age: 71
End: 2021-12-28
Attending: INTERNAL MEDICINE
Payer: MEDICARE

## 2021-12-28 VITALS
WEIGHT: 161 LBS | OXYGEN SATURATION: 98 % | TEMPERATURE: 98 F | HEIGHT: 58.43 IN | RESPIRATION RATE: 16 BRPM | SYSTOLIC BLOOD PRESSURE: 163 MMHG | DIASTOLIC BLOOD PRESSURE: 85 MMHG | BODY MASS INDEX: 33.34 KG/M2 | HEART RATE: 99 BPM

## 2021-12-28 DIAGNOSIS — C25.7 MALIGNANT NEOPLASM OF OTHER PARTS OF PANCREAS (HCC): Primary | ICD-10-CM

## 2021-12-28 PROCEDURE — 85025 COMPLETE CBC W/AUTO DIFF WBC: CPT

## 2021-12-28 PROCEDURE — 96367 TX/PROPH/DG ADDL SEQ IV INF: CPT

## 2021-12-28 PROCEDURE — 96417 CHEMO IV INFUS EACH ADDL SEQ: CPT

## 2021-12-28 PROCEDURE — 96413 CHEMO IV INFUSION 1 HR: CPT

## 2021-12-28 PROCEDURE — 96375 TX/PRO/DX INJ NEW DRUG ADDON: CPT

## 2021-12-28 NOTE — PROGRESS NOTES
Pt here for C4D15 abraxane/gem. Arrives Ambulating independently, accompanied by Self           Pregnancy screening: Not applicable    Modifications in dose or schedule: Yes - gemzar dose reduced due to thrombocytopenia.      Frequency of blood return and

## 2022-01-01 ENCOUNTER — TELEPHONE (OUTPATIENT)
Dept: FAMILY MEDICINE CLINIC | Facility: CLINIC | Age: 72
End: 2022-01-01

## 2022-01-01 DIAGNOSIS — C25.9 MALIGNANT NEOPLASM OF PANCREAS, UNSPECIFIED LOCATION OF MALIGNANCY (HCC): ICD-10-CM

## 2022-01-01 RX ORDER — DOCUSATE SODIUM 100 MG/1
CAPSULE, LIQUID FILLED ORAL
Qty: 60 CAPSULE | Refills: 2 | Status: SHIPPED | OUTPATIENT
Start: 2022-01-01

## 2022-01-01 RX ORDER — PROCHLORPERAZINE MALEATE 10 MG
TABLET ORAL
Qty: 30 TABLET | Refills: 3 | Status: SHIPPED | OUTPATIENT
Start: 2022-01-01

## 2022-01-01 NOTE — PROGRESS NOTES
[facility] Hematology and Oncology Clinic Note    Diagnosis: Metastatic Pancreatic Cancer     Treatment History:  1.  Gemcitabine + Abraxane: 9/16/21-Current    Visit Diagnosis:  Malignant neoplasm of pancreas, unspecified location of malignancy ([facility] [address]    Histor suggested.    -9/8/21: PET/CT: CONCLUSION:  Focal enlargement of the pancreatic body with associated increased radiotracer uptake and some relative sparing centrally likely represents the known pancreatic mass with some central necrosis.       Similar ring Rfl: 3  LEVOTHYROXINE SODIUM 75 MCG Oral Tab, TAKE 1 TABLET(75 MCG) BY MOUTH DAILY, Disp: 30 tablet, Rfl: 3  ascorbic acid 500 MG Oral Tab, Take 500 mg by mouth daily. , Disp: , Rfl:   Cholecalciferol 50 MCG (2000 UT) Oral Tab, Take 2,000 Units by mouth rosalia 0.9% 105 mL IVPB, , Intravenous, Once, Haily Garcia MD, Stopped at 12/21/21 1524  [COMPLETED] PACLitaxel Protein-Bound Part (ABRAXANE) 125 mg/m2 = 215 mg in 43 mL chemo-infusion, 125 mg/m2 (Treatment Plan Recorded), Intravenous, Once, Haily Garcia, thoracic spine 12/2/2016   • Posterior vitreous detachment 6/11/2018   • Restless legs syndrome 12/14/2015   • Sleep apnea 2/16/2013   • Vitreous degeneration 2/4/2015   • Vitreous hemorrhage, left eye ([address] 10/7/2020     Past Surgical History:   Procedure 1/10/22:  A smaller and more subtle area of rarefaction seen in the pancreas, previously described as 2.3 x 2.4 cm, now about 1.4 x 1.4 cm.  This is a region of relative diminished enhancement compared to the rest the pancreas, appears smaller now.  No new suspicious for primary pancreatic neoplasm.  Hepatic metastatic disease is noted with the largest metastatic lesion measuring up to 3.0 cm.  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     2.  4 mm groundglass nodule in the right lower lobe.  Early pulmonary metasta Follow up: weekly    JOSE Mena MD  [facility]

## 2022-01-10 ENCOUNTER — OFFICE VISIT (OUTPATIENT)
Dept: FAMILY MEDICINE CLINIC | Facility: CLINIC | Age: 72
End: 2022-01-10
Payer: MEDICARE

## 2022-01-10 ENCOUNTER — HOSPITAL ENCOUNTER (OUTPATIENT)
Dept: CT IMAGING | Facility: HOSPITAL | Age: 72
Discharge: HOME | End: 2022-01-10
Attending: INTERNAL MEDICINE
Payer: MEDICARE

## 2022-01-10 VITALS
DIASTOLIC BLOOD PRESSURE: 84 MMHG | SYSTOLIC BLOOD PRESSURE: 132 MMHG | HEART RATE: 86 BPM | RESPIRATION RATE: 16 BRPM | OXYGEN SATURATION: 98 % | BODY MASS INDEX: 34 KG/M2 | TEMPERATURE: 98 F | WEIGHT: 163.38 LBS

## 2022-01-10 DIAGNOSIS — C25.9 MALIGNANT NEOPLASM OF PANCREAS, UNSPECIFIED LOCATION OF MALIGNANCY (HCC): ICD-10-CM

## 2022-01-10 DIAGNOSIS — C25.7 MALIGNANT NEOPLASM OF OTHER PARTS OF PANCREAS (HCC): Primary | ICD-10-CM

## 2022-01-10 PROCEDURE — 74170 CT ABD WO CNTRST FLWD CNTRST: CPT | Performed by: INTERNAL MEDICINE

## 2022-01-10 PROCEDURE — 71260 CT THORAX DX C+: CPT | Performed by: INTERNAL MEDICINE

## 2022-01-10 PROCEDURE — 99213 OFFICE O/P EST LOW 20 MIN: CPT | Performed by: FAMILY MEDICINE

## 2022-01-11 ENCOUNTER — OFFICE VISIT (OUTPATIENT)
Dept: HEMATOLOGY/ONCOLOGY | Facility: HOSPITAL | Age: 72
End: 2022-01-11
Attending: INTERNAL MEDICINE
Payer: MEDICARE

## 2022-01-11 VITALS
HEIGHT: 58.43 IN | SYSTOLIC BLOOD PRESSURE: 177 MMHG | DIASTOLIC BLOOD PRESSURE: 91 MMHG | BODY MASS INDEX: 33.83 KG/M2 | TEMPERATURE: 99 F | OXYGEN SATURATION: 98 % | WEIGHT: 163.38 LBS | RESPIRATION RATE: 16 BRPM | HEART RATE: 87 BPM

## 2022-01-11 DIAGNOSIS — C25.7 MALIGNANT NEOPLASM OF OTHER PARTS OF PANCREAS (HCC): Primary | ICD-10-CM

## 2022-01-11 DIAGNOSIS — C25.9 MALIGNANT NEOPLASM OF PANCREAS, UNSPECIFIED LOCATION OF MALIGNANCY (HCC): ICD-10-CM

## 2022-01-11 LAB
ALBUMIN SERPL-MCNC: 3.4 G/DL (ref 3.4–5)
ALBUMIN/GLOB SERPL: 0.9 {RATIO} (ref 1–2)
ALP LIVER SERPL-CCNC: 64 U/L
ALT SERPL-CCNC: 30 U/L
ANION GAP SERPL CALC-SCNC: 5 MMOL/L (ref 0–18)
AST SERPL-CCNC: 21 U/L (ref 15–37)
BASOPHILS # BLD AUTO: 0.03 X10(3) UL (ref 0–0.2)
BASOPHILS NFR BLD AUTO: 0.5 %
BILIRUB SERPL-MCNC: 0.3 MG/DL (ref 0.1–2)
BUN BLD-MCNC: 12 MG/DL (ref 7–18)
CALCIUM BLD-MCNC: 9.2 MG/DL (ref 8.5–10.1)
CANCER AG19-9 SERPL-ACNC: ABNORMAL U/ML (ref ?–37)
CHLORIDE SERPL-SCNC: 107 MMOL/L (ref 98–112)
CO2 SERPL-SCNC: 28 MMOL/L (ref 21–32)
CREAT BLD-MCNC: 0.94 MG/DL
EOSINOPHIL # BLD AUTO: 0.22 X10(3) UL (ref 0–0.7)
EOSINOPHIL NFR BLD AUTO: 3.3 %
ERYTHROCYTE [DISTWIDTH] IN BLOOD BY AUTOMATED COUNT: 15.5 %
FASTING STATUS PATIENT QL REPORTED: NO
GLOBULIN PLAS-MCNC: 3.6 G/DL (ref 2.8–4.4)
GLUCOSE BLD-MCNC: 119 MG/DL (ref 70–99)
HCT VFR BLD AUTO: 30.1 %
HGB BLD-MCNC: 9.5 G/DL
IMM GRANULOCYTES # BLD AUTO: 0.06 X10(3) UL (ref 0–1)
IMM GRANULOCYTES NFR BLD: 0.9 %
LYMPHOCYTES # BLD AUTO: 0.71 X10(3) UL (ref 1–4)
LYMPHOCYTES NFR BLD AUTO: 10.7 %
MCH RBC QN AUTO: 32.1 PG (ref 26–34)
MCHC RBC AUTO-ENTMCNC: 31.6 G/DL (ref 31–37)
MCV RBC AUTO: 101.7 FL
MONOCYTES # BLD AUTO: 1.21 X10(3) UL (ref 0.1–1)
MONOCYTES NFR BLD AUTO: 18.2 %
NEUTROPHILS # BLD AUTO: 4.42 X10 (3) UL (ref 1.5–7.7)
NEUTROPHILS # BLD AUTO: 4.42 X10(3) UL (ref 1.5–7.7)
NEUTROPHILS NFR BLD AUTO: 66.4 %
OSMOLALITY SERPL CALC.SUM OF ELEC: 291 MOSM/KG (ref 275–295)
PLATELET # BLD AUTO: 325 10(3)UL (ref 150–450)
POTASSIUM SERPL-SCNC: 3.5 MMOL/L (ref 3.5–5.1)
PROT SERPL-MCNC: 7 G/DL (ref 6.4–8.2)
RBC # BLD AUTO: 2.96 X10(6)UL
SODIUM SERPL-SCNC: 140 MMOL/L (ref 136–145)
WBC # BLD AUTO: 6.7 X10(3) UL (ref 4–11)

## 2022-01-11 PROCEDURE — 80053 COMPREHEN METABOLIC PANEL: CPT

## 2022-01-11 PROCEDURE — 96413 CHEMO IV INFUSION 1 HR: CPT

## 2022-01-11 PROCEDURE — 99214 OFFICE O/P EST MOD 30 MIN: CPT | Performed by: INTERNAL MEDICINE

## 2022-01-11 PROCEDURE — 96375 TX/PRO/DX INJ NEW DRUG ADDON: CPT

## 2022-01-11 PROCEDURE — 86301 IMMUNOASSAY TUMOR CA 19-9: CPT

## 2022-01-11 PROCEDURE — 96367 TX/PROPH/DG ADDL SEQ IV INF: CPT

## 2022-01-11 PROCEDURE — 96417 CHEMO IV INFUS EACH ADDL SEQ: CPT

## 2022-01-11 PROCEDURE — 85025 COMPLETE CBC W/AUTO DIFF WBC: CPT

## 2022-01-11 RX ORDER — ONDANSETRON HYDROCHLORIDE 8 MG/1
8 TABLET, FILM COATED ORAL EVERY 8 HOURS PRN
Qty: 30 TABLET | Refills: 3 | Status: SHIPPED | OUTPATIENT
Start: 2022-01-11

## 2022-01-11 NOTE — PROGRESS NOTES
Nutrition FU Consultation     Patient [address] [facility]  YOB: 1950  Medical Record Number: [id number]      Account Number: [de-identified]  Dietitian: Galilea Tanner RD, LDN     Date of visit: 1/11/22     Diet Rx: high protein/calorie as to

## 2022-01-11 NOTE — PROGRESS NOTES
Pt here for C5D1 Abraxane/Gemzar.   Arrives Ambulating independently, accompanied by Self           Pregnancy screening: Not applicable    Modifications in dose or schedule: No     Frequency of blood return and site check throughout administration: Prior to

## 2022-01-11 NOTE — PROGRESS NOTES
Chief Complaint:   Patient presents with: Follow - Up      HPI:   This is a 70year old female coming in for f/u care     Has been through chemotherapy    On  An off week this week     A lot of fatigue.          HISTORY:  Past Medical History:   Diagnosis 4 (four) hours as needed for Pain. 90 tablet 0   • morphINE ER 15 MG Oral Tab CR Take 1 tablet (15 mg total) by mouth every 12 (twelve) hours.  60 tablet 0   • docusate sodium (STOOL SOFTENER) 100 MG Oral Cap Take 1 capsule (100 mg total) by mouth 2 (two) t Denies headache, dizziness,   HEMATOLOGIC:  Denies bleeding or bruising. PSYCHIATRIC:  Denies depression or anxiety.   ENDOCRINOLOGIC:  Denies cold or heat intolerance,   ALLERGIES:  Denies allergic response,    EXAM:   /84 (BP Location: Left arm, Pa changing symptoms. Patient is to call with any side effects or complications from the treatments as a result of today.      Problem List:  Patient Active Problem List:     Gout     Sciatica     Obstructive sleep apnea (adult) (pediatric)     CKD (chronic k

## 2022-01-13 RX ORDER — MORPHINE SULFATE 15 MG/1
15 TABLET, FILM COATED, EXTENDED RELEASE ORAL EVERY 12 HOURS SCHEDULED
Qty: 60 TABLET | Refills: 0 | Status: SHIPPED | OUTPATIENT
Start: 2022-01-13 | End: 2022-02-12

## 2022-01-18 ENCOUNTER — OFFICE VISIT (OUTPATIENT)
Dept: HEMATOLOGY/ONCOLOGY | Facility: HOSPITAL | Age: 72
End: 2022-01-18
Attending: INTERNAL MEDICINE
Payer: MEDICARE

## 2022-01-18 VITALS
HEART RATE: 100 BPM | WEIGHT: 164.19 LBS | RESPIRATION RATE: 20 BRPM | DIASTOLIC BLOOD PRESSURE: 86 MMHG | SYSTOLIC BLOOD PRESSURE: 149 MMHG | BODY MASS INDEX: 34 KG/M2 | OXYGEN SATURATION: 98 % | HEIGHT: 58.43 IN | TEMPERATURE: 99 F

## 2022-01-18 DIAGNOSIS — C25.7 MALIGNANT NEOPLASM OF OTHER PARTS OF PANCREAS (HCC): Primary | ICD-10-CM

## 2022-01-18 LAB
BASOPHILS # BLD: 0 X10(3) UL (ref 0–0.2)
BASOPHILS NFR BLD: 0 %
EOSINOPHIL # BLD: 0.11 X10(3) UL (ref 0–0.7)
EOSINOPHIL NFR BLD: 3 %
ERYTHROCYTE [DISTWIDTH] IN BLOOD BY AUTOMATED COUNT: 15 %
HCT VFR BLD AUTO: 28.8 %
HGB BLD-MCNC: 8.8 G/DL
LYMPHOCYTES NFR BLD: 1.37 X10(3) UL (ref 1–4)
LYMPHOCYTES NFR BLD: 37 %
MCH RBC QN AUTO: 31.3 PG (ref 26–34)
MCHC RBC AUTO-ENTMCNC: 30.6 G/DL (ref 31–37)
MCV RBC AUTO: 102.5 FL
MONOCYTES # BLD: 0.41 X10(3) UL (ref 0.1–1)
MONOCYTES NFR BLD: 11 %
MYELOCYTES # BLD: 0.15 X10(3) UL
MYELOCYTES NFR BLD: 4 %
NEUTROPHILS # BLD AUTO: 1.89 X10 (3) UL (ref 1.5–7.7)
NEUTROPHILS NFR BLD: 42 %
NEUTS BAND NFR BLD: 3 %
NEUTS HYPERSEG # BLD: 1.67 X10(3) UL (ref 1.5–7.7)
PLATELET # BLD AUTO: 198 10(3)UL (ref 150–450)
PLATELET MORPHOLOGY: NORMAL
RBC # BLD AUTO: 2.81 X10(6)UL
TOTAL CELLS COUNTED BLD: 100
WBC # BLD AUTO: 3.7 X10(3) UL (ref 4–11)

## 2022-01-18 PROCEDURE — 96367 TX/PROPH/DG ADDL SEQ IV INF: CPT

## 2022-01-18 PROCEDURE — 85025 COMPLETE CBC W/AUTO DIFF WBC: CPT

## 2022-01-18 PROCEDURE — 96417 CHEMO IV INFUS EACH ADDL SEQ: CPT

## 2022-01-18 PROCEDURE — 96413 CHEMO IV INFUSION 1 HR: CPT

## 2022-01-18 PROCEDURE — 85007 BL SMEAR W/DIFF WBC COUNT: CPT

## 2022-01-18 PROCEDURE — 85027 COMPLETE CBC AUTOMATED: CPT

## 2022-01-18 PROCEDURE — 96375 TX/PRO/DX INJ NEW DRUG ADDON: CPT

## 2022-01-18 NOTE — PROGRESS NOTES
Pt here for C5D8 Abraxane/Gemzar.   Arrives Ambulating independently, accompanied by Self           Pregnancy screening: Denies possibility of pregnancy    Modifications in dose or schedule: No     Frequency of blood return and site check throughout adminis

## 2022-01-24 ENCOUNTER — LABORATORY ENCOUNTER (OUTPATIENT)
Dept: LAB | Age: 72
End: 2022-01-24
Attending: INTERNAL MEDICINE
Payer: MEDICARE

## 2022-01-24 DIAGNOSIS — I12.9 HYPERTENSIVE CHRONIC KIDNEY DISEASE: ICD-10-CM

## 2022-01-24 DIAGNOSIS — N17.9 ACUTE KIDNEY FAILURE, UNSPECIFIED (HCC): Primary | ICD-10-CM

## 2022-01-24 DIAGNOSIS — E66.3 OVER WEIGHT: ICD-10-CM

## 2022-01-24 DIAGNOSIS — I10 ESSENTIAL HYPERTENSION: ICD-10-CM

## 2022-01-24 DIAGNOSIS — M10.9 GOUT, UNSPECIFIED: ICD-10-CM

## 2022-01-24 LAB
ALBUMIN SERPL-MCNC: 3.2 G/DL (ref 3.4–5)
ANION GAP SERPL CALC-SCNC: 3 MMOL/L (ref 0–18)
BILIRUB UR QL STRIP.AUTO: NEGATIVE
BUN BLD-MCNC: 17 MG/DL (ref 7–18)
CALCIUM BLD-MCNC: 8.8 MG/DL (ref 8.5–10.1)
CHLORIDE SERPL-SCNC: 108 MMOL/L (ref 98–112)
CO2 SERPL-SCNC: 25 MMOL/L (ref 21–32)
COLOR UR AUTO: YELLOW
CREAT BLD-MCNC: 0.94 MG/DL
ERYTHROCYTE [DISTWIDTH] IN BLOOD BY AUTOMATED COUNT: 15.6 %
FASTING STATUS PATIENT QL REPORTED: YES
GLUCOSE BLD-MCNC: 107 MG/DL (ref 70–99)
GLUCOSE UR STRIP.AUTO-MCNC: NEGATIVE MG/DL
HCT VFR BLD AUTO: 28.4 %
HGB BLD-MCNC: 8.8 G/DL
KETONES UR STRIP.AUTO-MCNC: NEGATIVE MG/DL
MAGNESIUM SERPL-MCNC: 1.9 MG/DL (ref 1.6–2.6)
MCH RBC QN AUTO: 31.8 PG (ref 26–34)
MCHC RBC AUTO-ENTMCNC: 31 G/DL (ref 31–37)
MCV RBC AUTO: 102.5 FL
NITRITE UR QL STRIP.AUTO: NEGATIVE
OSMOLALITY SERPL CALC.SUM OF ELEC: 284 MOSM/KG (ref 275–295)
PH UR STRIP.AUTO: 5 [PH] (ref 5–8)
PHOSPHATE SERPL-MCNC: 3.1 MG/DL (ref 2.5–4.9)
PLATELET # BLD AUTO: 81 10(3)UL (ref 150–450)
PLATELET MORPHOLOGY: NORMAL
POTASSIUM SERPL-SCNC: 3.9 MMOL/L (ref 3.5–5.1)
PROT UR STRIP.AUTO-MCNC: 30 MG/DL
PTH-INTACT SERPL-MCNC: 43.4 PG/ML (ref 18.5–88)
RBC # BLD AUTO: 2.77 X10(6)UL
RBC UR QL AUTO: NEGATIVE
SODIUM SERPL-SCNC: 136 MMOL/L (ref 136–145)
SP GR UR STRIP.AUTO: 1.02 (ref 1–1.03)
URATE SERPL-MCNC: 3.4 MG/DL
UROBILINOGEN UR STRIP.AUTO-MCNC: 2 MG/DL
WBC # BLD AUTO: 2.8 X10(3) UL (ref 4–11)

## 2022-01-24 PROCEDURE — 82040 ASSAY OF SERUM ALBUMIN: CPT

## 2022-01-24 PROCEDURE — 80048 BASIC METABOLIC PNL TOTAL CA: CPT

## 2022-01-24 PROCEDURE — 36415 COLL VENOUS BLD VENIPUNCTURE: CPT

## 2022-01-24 PROCEDURE — 83970 ASSAY OF PARATHORMONE: CPT

## 2022-01-24 PROCEDURE — 84100 ASSAY OF PHOSPHORUS: CPT

## 2022-01-24 PROCEDURE — 85027 COMPLETE CBC AUTOMATED: CPT

## 2022-01-24 PROCEDURE — 84550 ASSAY OF BLOOD/URIC ACID: CPT

## 2022-01-24 PROCEDURE — 83735 ASSAY OF MAGNESIUM: CPT

## 2022-01-24 PROCEDURE — 81001 URINALYSIS AUTO W/SCOPE: CPT

## 2022-01-25 ENCOUNTER — OFFICE VISIT (OUTPATIENT)
Dept: HEMATOLOGY/ONCOLOGY | Facility: HOSPITAL | Age: 72
End: 2022-01-25
Attending: INTERNAL MEDICINE
Payer: MEDICARE

## 2022-01-25 VITALS
WEIGHT: 164 LBS | OXYGEN SATURATION: 98 % | DIASTOLIC BLOOD PRESSURE: 77 MMHG | TEMPERATURE: 98 F | SYSTOLIC BLOOD PRESSURE: 169 MMHG | BODY MASS INDEX: 33.96 KG/M2 | HEART RATE: 101 BPM | RESPIRATION RATE: 18 BRPM | HEIGHT: 58.43 IN

## 2022-01-25 DIAGNOSIS — C25.7 MALIGNANT NEOPLASM OF OTHER PARTS OF PANCREAS (HCC): Primary | ICD-10-CM

## 2022-01-25 LAB
BASOPHILS # BLD AUTO: 0.02 X10(3) UL (ref 0–0.2)
BASOPHILS NFR BLD AUTO: 0.7 %
EOSINOPHIL # BLD AUTO: 0.06 X10(3) UL (ref 0–0.7)
EOSINOPHIL NFR BLD AUTO: 2.1 %
ERYTHROCYTE [DISTWIDTH] IN BLOOD BY AUTOMATED COUNT: 15.8 %
HCT VFR BLD AUTO: 26.6 %
HGB BLD-MCNC: 8.1 G/DL
IMM GRANULOCYTES # BLD AUTO: 0.13 X10(3) UL (ref 0–1)
IMM GRANULOCYTES NFR BLD: 4.5 %
LYMPHOCYTES # BLD AUTO: 0.55 X10(3) UL (ref 1–4)
LYMPHOCYTES NFR BLD AUTO: 18.8 %
MCH RBC QN AUTO: 30.8 PG (ref 26–34)
MCHC RBC AUTO-ENTMCNC: 30.5 G/DL (ref 31–37)
MCV RBC AUTO: 101.1 FL
MONOCYTES # BLD AUTO: 0.5 X10(3) UL (ref 0.1–1)
MONOCYTES NFR BLD AUTO: 17.1 %
NEUTROPHILS # BLD AUTO: 1.66 X10 (3) UL (ref 1.5–7.7)
NEUTROPHILS # BLD AUTO: 1.66 X10(3) UL (ref 1.5–7.7)
NEUTROPHILS NFR BLD AUTO: 56.8 %
PLATELET # BLD AUTO: 73 10(3)UL (ref 150–450)
RBC # BLD AUTO: 2.63 X10(6)UL
WBC # BLD AUTO: 2.9 X10(3) UL (ref 4–11)

## 2022-01-25 PROCEDURE — 96367 TX/PROPH/DG ADDL SEQ IV INF: CPT

## 2022-01-25 PROCEDURE — 96375 TX/PRO/DX INJ NEW DRUG ADDON: CPT

## 2022-01-25 PROCEDURE — 96417 CHEMO IV INFUS EACH ADDL SEQ: CPT

## 2022-01-25 PROCEDURE — 96413 CHEMO IV INFUSION 1 HR: CPT

## 2022-01-25 PROCEDURE — 85025 COMPLETE CBC W/AUTO DIFF WBC: CPT

## 2022-01-25 NOTE — PROGRESS NOTES
Pt here for C5D15 Abraxane/Gemzar.   Arrives Ambulating independently, accompanied by Self           Pregnancy screening: Denies possibility of pregnancy    Modifications in dose or schedule: yes - gemzar dose-reduced     Frequency of blood return and site

## 2022-02-01 NOTE — TELEPHONE ENCOUNTER
Future appt: Your appointments     Date & Time Appointment Department [place] campus)    Feb 08, 2022 10:00 AM CST CHEMO INFUSION CHEMOTHERAPY with [address] ([address])        Feb 08, 2022 10:30 AM CST Follow-Up Visit with Kyara Cui MD [address] [facility] in [place] ([address])        Feb 08, 2022 10:30 AM CST [facility] Hematology Oncology Dietary [place] [address] [place] [address] in [place] ([address])    Your appointment is on the [address] [place] [address] Rd in the [facility]. The address is [place] [address], [facility], [address], [place]. Please register at the [address] [place] [address] on the second floor. Feb 08, 2022 10:30 AM CST [facility] Hematology Oncology Dietary Follow Up with [address] [place] [address] Outpatient Nutrition [address])    Your appointment is on the [address] [place] [address] Rd in the [facility]. The address is [place] [address], [facility], [address], [place]. Please register at the [address] [place] [address] on the second floor. Feb 08, 2022 10:30 AM CST [facility] Hematology Oncology Dietary Follow Up with  HEM/ONC KATIE Nina  [address]. in [place] ([facility])    Your appointment is on the [address] [place] [address] Rd in the [facility]. The address is [address], [place]. Please register at the [address] [place] [address] on the second floor.         Feb 15, 2022 10:00 AM CST CHEMO INFUSION CHEMOTHERAPY with [address])        Apr 11, 2022 10:30 AM CDT Follow Up Visit with Lucina Arnold MD [address] [facility] in [place] [address]  [address] [address]  [facility]  [address] [place] [address] [address]  [address]  [address] [place] [address] [address]  [address] [address]  [address] [facility] [place]  [id number]  [phone]        Last Appointment with provider:   1/10/2022 for follow up, malignant neoplasm  Last appointment at [facility]:  1/10/2022  Cholesterol, Total (mg/dL)   Date Value   10/21/2019 214 (H)     HDL Cholesterol (mg/dL)   Date Value   10/21/2019 68 (H)     LDL Cholesterol (mg/dL)   Date Value   10/21/2019 116 (H)     Triglycerides (mg/dL)   Date Value   10/21/2019 150 (H)     No results found for: EAG, A1C  Lab Results   Component Value Date    T4F 0.8 10/21/2019    TSH 4.100 (H) 10/21/2019       No follow-ups on file.

## 2022-02-02 RX ORDER — LEVOTHYROXINE SODIUM 0.07 MG/1
TABLET ORAL
Qty: 30 TABLET | Refills: 3 | Status: SHIPPED | OUTPATIENT
Start: 2022-02-02

## 2022-02-08 ENCOUNTER — OFFICE VISIT (OUTPATIENT)
Dept: HEMATOLOGY/ONCOLOGY | Facility: HOSPITAL | Age: 72
End: 2022-02-08
Attending: INTERNAL MEDICINE
Payer: MEDICARE

## 2022-02-08 VITALS
HEIGHT: 58.43 IN | RESPIRATION RATE: 18 BRPM | WEIGHT: 166.81 LBS | TEMPERATURE: 99 F | SYSTOLIC BLOOD PRESSURE: 152 MMHG | BODY MASS INDEX: 34.54 KG/M2 | HEART RATE: 102 BPM | DIASTOLIC BLOOD PRESSURE: 90 MMHG | OXYGEN SATURATION: 97 %

## 2022-02-08 DIAGNOSIS — C25.7 MALIGNANT NEOPLASM OF OTHER PARTS OF PANCREAS (HCC): Primary | ICD-10-CM

## 2022-02-08 LAB
ALBUMIN SERPL-MCNC: 3.4 G/DL (ref 3.4–5)
ALBUMIN/GLOB SERPL: 1 {RATIO} (ref 1–2)
ALP LIVER SERPL-CCNC: 61 U/L
ALT SERPL-CCNC: 27 U/L
ANION GAP SERPL CALC-SCNC: 5 MMOL/L (ref 0–18)
AST SERPL-CCNC: 22 U/L (ref 15–37)
BASOPHILS # BLD AUTO: 0.04 X10(3) UL (ref 0–0.2)
BASOPHILS NFR BLD AUTO: 0.6 %
BILIRUB SERPL-MCNC: 0.4 MG/DL (ref 0.1–2)
BUN BLD-MCNC: 11 MG/DL (ref 7–18)
CALCIUM BLD-MCNC: 9.1 MG/DL (ref 8.5–10.1)
CANCER AG19-9 SERPL-ACNC: ABNORMAL U/ML (ref ?–37)
CHLORIDE SERPL-SCNC: 108 MMOL/L (ref 98–112)
CO2 SERPL-SCNC: 26 MMOL/L (ref 21–32)
CREAT BLD-MCNC: 0.89 MG/DL
EOSINOPHIL # BLD AUTO: 0.27 X10(3) UL (ref 0–0.7)
EOSINOPHIL NFR BLD AUTO: 4.3 %
ERYTHROCYTE [DISTWIDTH] IN BLOOD BY AUTOMATED COUNT: 16.6 %
GLOBULIN PLAS-MCNC: 3.5 G/DL (ref 2.8–4.4)
GLUCOSE BLD-MCNC: 153 MG/DL (ref 70–99)
HCT VFR BLD AUTO: 29.3 %
HGB BLD-MCNC: 9.4 G/DL
IMM GRANULOCYTES # BLD AUTO: 0.06 X10(3) UL (ref 0–1)
IMM GRANULOCYTES NFR BLD: 1 %
LYMPHOCYTES # BLD AUTO: 0.74 X10(3) UL (ref 1–4)
LYMPHOCYTES NFR BLD AUTO: 11.9 %
MCH RBC QN AUTO: 32 PG (ref 26–34)
MCHC RBC AUTO-ENTMCNC: 32.1 G/DL (ref 31–37)
MCV RBC AUTO: 99.7 FL
MONOCYTES # BLD AUTO: 1.13 X10(3) UL (ref 0.1–1)
MONOCYTES NFR BLD AUTO: 18.2 %
NEUTROPHILS # BLD AUTO: 3.98 X10 (3) UL (ref 1.5–7.7)
NEUTROPHILS # BLD AUTO: 3.98 X10(3) UL (ref 1.5–7.7)
NEUTROPHILS NFR BLD AUTO: 64 %
OSMOLALITY SERPL CALC.SUM OF ELEC: 290 MOSM/KG (ref 275–295)
PLATELET # BLD AUTO: 302 10(3)UL (ref 150–450)
POTASSIUM SERPL-SCNC: 3.6 MMOL/L (ref 3.5–5.1)
PROT SERPL-MCNC: 6.9 G/DL (ref 6.4–8.2)
RBC # BLD AUTO: 2.94 X10(6)UL
SODIUM SERPL-SCNC: 139 MMOL/L (ref 136–145)
WBC # BLD AUTO: 6.2 X10(3) UL (ref 4–11)

## 2022-02-08 PROCEDURE — 96375 TX/PRO/DX INJ NEW DRUG ADDON: CPT

## 2022-02-08 PROCEDURE — 85025 COMPLETE CBC W/AUTO DIFF WBC: CPT

## 2022-02-08 PROCEDURE — 80053 COMPREHEN METABOLIC PANEL: CPT

## 2022-02-08 PROCEDURE — 86301 IMMUNOASSAY TUMOR CA 19-9: CPT

## 2022-02-08 PROCEDURE — 96413 CHEMO IV INFUSION 1 HR: CPT

## 2022-02-08 PROCEDURE — 96417 CHEMO IV INFUS EACH ADDL SEQ: CPT

## 2022-02-08 PROCEDURE — 96367 TX/PROPH/DG ADDL SEQ IV INF: CPT

## 2022-02-08 PROCEDURE — 99214 OFFICE O/P EST MOD 30 MIN: CPT | Performed by: INTERNAL MEDICINE

## 2022-02-08 RX ORDER — MORPHINE SULFATE 15 MG/1
15 TABLET, FILM COATED, EXTENDED RELEASE ORAL EVERY 12 HOURS SCHEDULED
Qty: 60 TABLET | Refills: 0 | Status: SHIPPED | OUTPATIENT
Start: 2022-02-08 | End: 2022-03-08

## 2022-02-08 NOTE — PROGRESS NOTES
Outpatient Oncology Care Plan   Problem list:   knowledge deficit   Problems related to:   chemotherapy   Interventions:   provided general teaching   Expected outcomes:   understands plan of care   Progress towards outcome: making progress     Education Record   Learner: Patient   Barriers / Limitations: None   Method: Discussion   Outcome: Shows understanding   Comments:  Patient here for follow-up and planned C6D1 treatment. States energy is great. Denies any pain, fevers, nausea, or weight loss.

## 2022-02-08 NOTE — PROGRESS NOTES
Pt here for C6D1 abraxane/gem.   Arrives Ambulating independently, accompanied by Self           Pregnancy screening: Not applicable    Modifications in dose or schedule: No     Frequency of blood return and site check throughout administration: Prior to administration, Prior to each drug and At completion of therapy   Discharged to Home, Ambulating independently, accompanied by:Self    Outpatient Oncology Care Plan  Problem list:  fatigue  Problems related to:    chemotherapy  side effect of treatment  Interventions:  chemotherapy teaching  monitor effect of therapy  monitor lab values  promoted rest  Expected outcomes:  optimal lab values  safe in environment  symptoms relieved/minimized  understands plan of care  Progress towards outcome:  making progress    Education Record    Learner:  Patient  Barriers / Limitations:  None  Method:  Brief focused and Discussion  Outcome:  Verbalized understanding  Comments:

## 2022-02-10 RX ORDER — HYDROCODONE BITARTRATE AND ACETAMINOPHEN 10; 325 MG/1; MG/1
1-2 TABLET ORAL EVERY 4 HOURS PRN
Qty: 90 TABLET | Refills: 0 | Status: SHIPPED | OUTPATIENT
Start: 2022-02-10 | End: 2022-03-08

## 2022-02-15 ENCOUNTER — OFFICE VISIT (OUTPATIENT)
Dept: HEMATOLOGY/ONCOLOGY | Facility: HOSPITAL | Age: 72
End: 2022-02-15
Attending: INTERNAL MEDICINE
Payer: MEDICARE

## 2022-02-15 VITALS
SYSTOLIC BLOOD PRESSURE: 192 MMHG | TEMPERATURE: 98 F | WEIGHT: 166.38 LBS | HEIGHT: 58.43 IN | DIASTOLIC BLOOD PRESSURE: 77 MMHG | BODY MASS INDEX: 34.45 KG/M2 | RESPIRATION RATE: 18 BRPM | HEART RATE: 96 BPM | OXYGEN SATURATION: 97 %

## 2022-02-15 DIAGNOSIS — C25.7 MALIGNANT NEOPLASM OF OTHER PARTS OF PANCREAS (HCC): Primary | ICD-10-CM

## 2022-02-15 LAB
BASOPHILS # BLD: 0 X10(3) UL (ref 0–0.2)
BASOPHILS NFR BLD: 0 %
EOSINOPHIL # BLD: 0.19 X10(3) UL (ref 0–0.7)
EOSINOPHIL NFR BLD: 5 %
HCT VFR BLD AUTO: 28.1 %
HGB BLD-MCNC: 9 G/DL
LYMPHOCYTES NFR BLD: 0.96 X10(3) UL (ref 1–4)
LYMPHOCYTES NFR BLD: 26 %
MCH RBC QN AUTO: 32.5 PG (ref 26–34)
MCHC RBC AUTO-ENTMCNC: 32 G/DL (ref 31–37)
MCV RBC AUTO: 101.4 FL
METAMYELOCYTES # BLD: 0.04 X10(3) UL
METAMYELOCYTES NFR BLD: 1 %
MONOCYTES # BLD: 0.48 X10(3) UL (ref 0.1–1)
MONOCYTES NFR BLD: 13 %
MYELOCYTES # BLD: 0.04 X10(3) UL
MYELOCYTES NFR BLD: 1 %
NEUTROPHILS # BLD AUTO: 1.75 X10 (3) UL (ref 1.5–7.7)
NEUTROPHILS NFR BLD: 52 %
NEUTS BAND NFR BLD: 2 %
PLATELET # BLD AUTO: 187 10(3)UL (ref 150–450)
PLATELET MORPHOLOGY: NORMAL
RBC # BLD AUTO: 2.77 X10(6)UL
TOTAL CELLS COUNTED BLD: 100
WBC # BLD AUTO: 3.7 X10(3) UL (ref 4–11)

## 2022-02-15 PROCEDURE — 96413 CHEMO IV INFUSION 1 HR: CPT

## 2022-02-15 PROCEDURE — 96417 CHEMO IV INFUS EACH ADDL SEQ: CPT

## 2022-02-15 PROCEDURE — 85027 COMPLETE CBC AUTOMATED: CPT

## 2022-02-15 PROCEDURE — 96367 TX/PROPH/DG ADDL SEQ IV INF: CPT

## 2022-02-15 PROCEDURE — 96375 TX/PRO/DX INJ NEW DRUG ADDON: CPT

## 2022-02-15 PROCEDURE — 85007 BL SMEAR W/DIFF WBC COUNT: CPT

## 2022-02-15 PROCEDURE — 85025 COMPLETE CBC W/AUTO DIFF WBC: CPT

## 2022-02-15 NOTE — PROGRESS NOTES
Pt here for C6D8 Abraxane/Gemzar.   Arrives Ambulating independently, accompanied by Self           Pregnancy screening: Denies possibility of pregnancy    Modifications in dose or schedule: No     Frequency of blood return and site check throughout administration: Prior to administration and At completion of therapy   Discharged to Home, Ambulating independently, accompanied by:Self    Outpatient Oncology Care Plan  Problem list:  knowledge deficit  Problems related to:    chemotherapy  disease/disease progression  Interventions:  maintain safe environment  provided general teaching  Expected outcomes:  safe in environment  understands plan of care  Progress towards outcome:  making progress    Education Record    Learner:  Patient  Barriers / Limitations:  None  Method:  Brief focused and Reinforcement  Outcome:  Shows understanding  Comments:

## 2022-02-22 ENCOUNTER — OFFICE VISIT (OUTPATIENT)
Dept: HEMATOLOGY/ONCOLOGY | Facility: HOSPITAL | Age: 72
End: 2022-02-22
Attending: INTERNAL MEDICINE
Payer: MEDICARE

## 2022-02-22 VITALS
HEIGHT: 58.43 IN | WEIGHT: 162.81 LBS | BODY MASS INDEX: 33.72 KG/M2 | HEART RATE: 98 BPM | SYSTOLIC BLOOD PRESSURE: 173 MMHG | OXYGEN SATURATION: 97 % | RESPIRATION RATE: 18 BRPM | DIASTOLIC BLOOD PRESSURE: 85 MMHG

## 2022-02-22 DIAGNOSIS — C25.7 MALIGNANT NEOPLASM OF OTHER PARTS OF PANCREAS (HCC): Primary | ICD-10-CM

## 2022-02-22 LAB
BASOPHILS # BLD AUTO: 0.03 X10(3) UL (ref 0–0.2)
BASOPHILS NFR BLD AUTO: 0.9 %
EOSINOPHIL # BLD AUTO: 0.06 X10(3) UL (ref 0–0.7)
EOSINOPHIL NFR BLD AUTO: 1.9 %
ERYTHROCYTE [DISTWIDTH] IN BLOOD BY AUTOMATED COUNT: 16.4 %
HCT VFR BLD AUTO: 28.6 %
HGB BLD-MCNC: 9.4 G/DL
IMM GRANULOCYTES # BLD AUTO: 0.07 X10(3) UL (ref 0–1)
IMM GRANULOCYTES NFR BLD: 2.2 %
LYMPHOCYTES # BLD AUTO: 0.59 X10(3) UL (ref 1–4)
LYMPHOCYTES NFR BLD AUTO: 18.6 %
MCH RBC QN AUTO: 31.8 PG (ref 26–34)
MCHC RBC AUTO-ENTMCNC: 32.9 G/DL (ref 31–37)
MCV RBC AUTO: 96.6 FL
MONOCYTES # BLD AUTO: 0.52 X10(3) UL (ref 0.1–1)
MONOCYTES NFR BLD AUTO: 16.4 %
NEUTROPHILS # BLD AUTO: 1.91 X10 (3) UL (ref 1.5–7.7)
NEUTROPHILS # BLD AUTO: 1.91 X10(3) UL (ref 1.5–7.7)
NEUTROPHILS NFR BLD AUTO: 60 %
PLATELET # BLD AUTO: 77 10(3)UL (ref 150–450)
RBC # BLD AUTO: 2.96 X10(6)UL
WBC # BLD AUTO: 3.2 X10(3) UL (ref 4–11)

## 2022-02-22 PROCEDURE — 96375 TX/PRO/DX INJ NEW DRUG ADDON: CPT

## 2022-02-22 PROCEDURE — 99215 OFFICE O/P EST HI 40 MIN: CPT | Performed by: NURSE PRACTITIONER

## 2022-02-22 PROCEDURE — 96413 CHEMO IV INFUSION 1 HR: CPT

## 2022-02-22 PROCEDURE — 96367 TX/PROPH/DG ADDL SEQ IV INF: CPT

## 2022-02-22 PROCEDURE — 85025 COMPLETE CBC W/AUTO DIFF WBC: CPT

## 2022-02-22 PROCEDURE — 96417 CHEMO IV INFUS EACH ADDL SEQ: CPT

## 2022-02-22 NOTE — PROGRESS NOTES
Education Record    Learner:  Patient    Disease / Diagnosis:pancreatic     Barriers / Limitations:  None   Comments:    Method:  Discussion   Comments:    General Topics:  Plan of care reviewed   Comments:    Outcome:  Shows understanding   Comments:    Patient here for C6D15 gem/abraxane. Dose reduced per APN for platelet count. All pre-medications administered as ordered.

## 2022-02-22 NOTE — PROGRESS NOTES
Patient presents with: Follow - Up: apn assessment    Patient here for apn assessment C6 Abraxane/Gemzar is expected. Patient is eating and drinking small amount with no issue occasional nausea but using antiemetic with relief. Patient does have tightening in both calves but using compression stockings with relief.     Education Record    Learner:  Patient    Disease / Diagnosis:     Barriers / Limitations:  None   Comments:    Method:  Brief focused   Comments:    General Topics:     Comments:    Outcome:  Shows understanding   Comments:

## 2022-03-08 ENCOUNTER — OFFICE VISIT (OUTPATIENT)
Dept: HEMATOLOGY/ONCOLOGY | Facility: HOSPITAL | Age: 72
End: 2022-03-08
Attending: INTERNAL MEDICINE
Payer: MEDICARE

## 2022-03-08 VITALS
WEIGHT: 168.5 LBS | OXYGEN SATURATION: 96 % | HEART RATE: 86 BPM | SYSTOLIC BLOOD PRESSURE: 186 MMHG | TEMPERATURE: 98 F | RESPIRATION RATE: 16 BRPM | BODY MASS INDEX: 34.89 KG/M2 | DIASTOLIC BLOOD PRESSURE: 93 MMHG | HEIGHT: 58.43 IN

## 2022-03-08 DIAGNOSIS — K86.89 PANCREATIC MASS: ICD-10-CM

## 2022-03-08 DIAGNOSIS — C25.7 MALIGNANT NEOPLASM OF OTHER PARTS OF PANCREAS (HCC): Primary | ICD-10-CM

## 2022-03-08 LAB
ALBUMIN SERPL-MCNC: 3.3 G/DL (ref 3.4–5)
ALBUMIN/GLOB SERPL: 1 {RATIO} (ref 1–2)
ALP LIVER SERPL-CCNC: 57 U/L
ALT SERPL-CCNC: 27 U/L
ANION GAP SERPL CALC-SCNC: 4 MMOL/L (ref 0–18)
AST SERPL-CCNC: 23 U/L (ref 15–37)
BASOPHILS # BLD AUTO: 0.04 X10(3) UL (ref 0–0.2)
BASOPHILS NFR BLD AUTO: 0.8 %
BILIRUB SERPL-MCNC: 0.4 MG/DL (ref 0.1–2)
BUN BLD-MCNC: 14 MG/DL (ref 7–18)
CALCIUM BLD-MCNC: 9 MG/DL (ref 8.5–10.1)
CANCER AG19-9 SERPL-ACNC: ABNORMAL U/ML (ref ?–37)
CHLORIDE SERPL-SCNC: 112 MMOL/L (ref 98–112)
CO2 SERPL-SCNC: 26 MMOL/L (ref 21–32)
CREAT BLD-MCNC: 0.86 MG/DL
EOSINOPHIL # BLD AUTO: 0.28 X10(3) UL (ref 0–0.7)
EOSINOPHIL NFR BLD AUTO: 5.7 %
ERYTHROCYTE [DISTWIDTH] IN BLOOD BY AUTOMATED COUNT: 17.1 %
FASTING STATUS PATIENT QL REPORTED: NO
GLOBULIN PLAS-MCNC: 3.4 G/DL (ref 2.8–4.4)
GLUCOSE BLD-MCNC: 127 MG/DL (ref 70–99)
HCT VFR BLD AUTO: 27.8 %
HGB BLD-MCNC: 9 G/DL
IMM GRANULOCYTES # BLD AUTO: 0.04 X10(3) UL (ref 0–1)
IMM GRANULOCYTES NFR BLD: 0.8 %
LYMPHOCYTES # BLD AUTO: 0.76 X10(3) UL (ref 1–4)
LYMPHOCYTES NFR BLD AUTO: 15.4 %
MCH RBC QN AUTO: 32.3 PG (ref 26–34)
MCHC RBC AUTO-ENTMCNC: 32.4 G/DL (ref 31–37)
MCV RBC AUTO: 99.6 FL
MONOCYTES # BLD AUTO: 1 X10(3) UL (ref 0.1–1)
MONOCYTES NFR BLD AUTO: 20.2 %
NEUTROPHILS # BLD AUTO: 2.82 X10 (3) UL (ref 1.5–7.7)
NEUTROPHILS # BLD AUTO: 2.82 X10(3) UL (ref 1.5–7.7)
NEUTROPHILS NFR BLD AUTO: 57.1 %
OSMOLALITY SERPL CALC.SUM OF ELEC: 296 MOSM/KG (ref 275–295)
PLATELET # BLD AUTO: 286 10(3)UL (ref 150–450)
POTASSIUM SERPL-SCNC: 3.7 MMOL/L (ref 3.5–5.1)
PROT SERPL-MCNC: 6.7 G/DL (ref 6.4–8.2)
RBC # BLD AUTO: 2.79 X10(6)UL
SODIUM SERPL-SCNC: 142 MMOL/L (ref 136–145)
WBC # BLD AUTO: 4.9 X10(3) UL (ref 4–11)

## 2022-03-08 PROCEDURE — 96367 TX/PROPH/DG ADDL SEQ IV INF: CPT

## 2022-03-08 PROCEDURE — 80053 COMPREHEN METABOLIC PANEL: CPT

## 2022-03-08 PROCEDURE — 96413 CHEMO IV INFUSION 1 HR: CPT

## 2022-03-08 PROCEDURE — 96375 TX/PRO/DX INJ NEW DRUG ADDON: CPT

## 2022-03-08 PROCEDURE — 85025 COMPLETE CBC W/AUTO DIFF WBC: CPT

## 2022-03-08 PROCEDURE — 99214 OFFICE O/P EST MOD 30 MIN: CPT | Performed by: INTERNAL MEDICINE

## 2022-03-08 PROCEDURE — 86301 IMMUNOASSAY TUMOR CA 19-9: CPT

## 2022-03-08 PROCEDURE — 96417 CHEMO IV INFUS EACH ADDL SEQ: CPT

## 2022-03-08 RX ORDER — MORPHINE SULFATE 15 MG/1
15 TABLET, FILM COATED, EXTENDED RELEASE ORAL EVERY 12 HOURS SCHEDULED
Qty: 60 TABLET | Refills: 0 | Status: SHIPPED | OUTPATIENT
Start: 2022-03-08 | End: 2022-04-07

## 2022-03-08 RX ORDER — HYDROCODONE BITARTRATE AND ACETAMINOPHEN 10; 325 MG/1; MG/1
1-2 TABLET ORAL EVERY 4 HOURS PRN
Qty: 90 TABLET | Refills: 0 | Status: SHIPPED | OUTPATIENT
Start: 2022-03-08

## 2022-03-08 NOTE — PROGRESS NOTES
Pt here for C7D1.   Arrives Ambulating independently, accompanied by Self           Pregnancy screening: Not applicable    Modifications in dose or schedule: No     Frequency of blood return and site check throughout administration: Prior to administration and At completion of therapy   Discharged to Home, Ambulating independently, accompanied by:Self    Outpatient Oncology Care Plan  Problem list:  fatigue  nausea and vomiting  Problems related to:    chemotherapy  disease/disease progression  side effect of treatment  Interventions:  maintain safe environment  monitor effect of therapy  promoted rest  provided general teaching  Expected outcomes:  adequate sleep/rest  free of infection  optimal lab values  safe in environment  symptoms relieved/minimized  understands plan of care  Progress towards outcome:  making progress    Education Record    Learner:  Patient  Barriers / Limitations:  None  Method:  Brief focused  Outcome:  Shows understanding  Comments:

## 2022-03-08 NOTE — PROGRESS NOTES
Outpatient Oncology Care Plan   Problem list:   knowledge deficit   Problems related to:   chemotherapy   Interventions:   provided general teaching   Expected outcomes:   understands plan of care   Progress towards outcome: making progress     Education Record   Learner: Patient   Barriers / Limitations: None   Method: Discussion   Outcome: Shows understanding   Comments:  Patient here for follow-up and planned C7D1 treatment. Having some numbness to both hands. Denies any fevers, nausea, unmanaged pain, bowel issues, or appetite changes. Swelling remains unchanged to BLE.

## 2022-03-15 ENCOUNTER — OFFICE VISIT (OUTPATIENT)
Dept: HEMATOLOGY/ONCOLOGY | Facility: HOSPITAL | Age: 72
End: 2022-03-15
Attending: INTERNAL MEDICINE
Payer: MEDICARE

## 2022-03-15 VITALS
RESPIRATION RATE: 20 BRPM | WEIGHT: 160.63 LBS | SYSTOLIC BLOOD PRESSURE: 151 MMHG | TEMPERATURE: 98 F | HEIGHT: 58.43 IN | BODY MASS INDEX: 33.26 KG/M2 | DIASTOLIC BLOOD PRESSURE: 83 MMHG | OXYGEN SATURATION: 98 % | HEART RATE: 84 BPM

## 2022-03-15 DIAGNOSIS — C25.7 MALIGNANT NEOPLASM OF OTHER PARTS OF PANCREAS (HCC): Primary | ICD-10-CM

## 2022-03-15 LAB
BASOPHILS # BLD AUTO: 0.05 X10(3) UL (ref 0–0.2)
BASOPHILS NFR BLD AUTO: 1.1 %
EOSINOPHIL # BLD AUTO: 0.08 X10(3) UL (ref 0–0.7)
EOSINOPHIL NFR BLD AUTO: 1.8 %
ERYTHROCYTE [DISTWIDTH] IN BLOOD BY AUTOMATED COUNT: 16 %
HCT VFR BLD AUTO: 29.9 %
HGB BLD-MCNC: 9.6 G/DL
IMM GRANULOCYTES # BLD AUTO: 0.11 X10(3) UL (ref 0–1)
IMM GRANULOCYTES NFR BLD: 2.5 %
LYMPHOCYTES # BLD AUTO: 0.94 X10(3) UL (ref 1–4)
LYMPHOCYTES NFR BLD AUTO: 21.6 %
MCH RBC QN AUTO: 31.8 PG (ref 26–34)
MCHC RBC AUTO-ENTMCNC: 32.1 G/DL (ref 31–37)
MCV RBC AUTO: 99 FL
MONOCYTES # BLD AUTO: 0.88 X10(3) UL (ref 0.1–1)
MONOCYTES NFR BLD AUTO: 20.2 %
NEUTROPHILS # BLD AUTO: 2.3 X10 (3) UL (ref 1.5–7.7)
NEUTROPHILS # BLD AUTO: 2.3 X10(3) UL (ref 1.5–7.7)
NEUTROPHILS NFR BLD AUTO: 52.8 %
PLATELET # BLD AUTO: 203 10(3)UL (ref 150–450)
RBC # BLD AUTO: 3.02 X10(6)UL
WBC # BLD AUTO: 4.4 X10(3) UL (ref 4–11)

## 2022-03-15 PROCEDURE — 96417 CHEMO IV INFUS EACH ADDL SEQ: CPT

## 2022-03-15 PROCEDURE — 96367 TX/PROPH/DG ADDL SEQ IV INF: CPT

## 2022-03-15 PROCEDURE — 96413 CHEMO IV INFUSION 1 HR: CPT

## 2022-03-15 PROCEDURE — 85025 COMPLETE CBC W/AUTO DIFF WBC: CPT

## 2022-03-15 PROCEDURE — 96375 TX/PRO/DX INJ NEW DRUG ADDON: CPT

## 2022-03-15 NOTE — PROGRESS NOTES
Pt here for C7D8.   Arrives Ambulating independently, accompanied by Self           Pregnancy screening: Not applicable    Modifications in dose or schedule: No     Frequency of blood return and site check throughout administration: Prior to administration   Discharged to Home, Ambulating independently, accompanied by:Self    Outpatient Oncology Care Plan  Problem list:  knowledge deficit  Problems related to:    chemotherapy  Interventions:  maintain safe environment  Expected outcomes:  understands plan of care  Progress towards outcome:  making progress    Education Record    Learner:  Patient  Barriers / Limitations:  None  Method:  Brief focused  Outcome:  Shows understanding  Comments:

## 2022-03-19 ENCOUNTER — HOSPITAL ENCOUNTER (OUTPATIENT)
Dept: CT IMAGING | Facility: HOSPITAL | Age: 72
Discharge: HOME | End: 2022-03-19
Attending: INTERNAL MEDICINE
Payer: MEDICARE

## 2022-03-19 DIAGNOSIS — C25.7 MALIGNANT NEOPLASM OF OTHER PARTS OF PANCREAS (HCC): ICD-10-CM

## 2022-03-19 PROCEDURE — 71260 CT THORAX DX C+: CPT | Performed by: INTERNAL MEDICINE

## 2022-03-19 PROCEDURE — 74170 CT ABD WO CNTRST FLWD CNTRST: CPT | Performed by: INTERNAL MEDICINE

## 2022-03-19 RX ORDER — IOHEXOL 350 MG/ML
80 INJECTION, SOLUTION INTRAVENOUS
Status: COMPLETED | OUTPATIENT
Start: 2022-03-19 | End: 2022-03-19

## 2022-03-19 RX ADMIN — IOHEXOL 80 ML: 350 INJECTION, SOLUTION INTRAVENOUS at 11:10:00

## 2022-03-21 ENCOUNTER — TELEPHONE (OUTPATIENT)
Dept: HEMATOLOGY/ONCOLOGY | Facility: HOSPITAL | Age: 72
End: 2022-03-21

## 2022-03-21 NOTE — TELEPHONE ENCOUNTER
Called to review the most recent CT. Unfortunately, she shows signs of progression. Will need to switch regimens. She still has a good performance status. Will try modified FOLFIRINOX. If not tolerated, drop oxaliplatin for FOLFIRI only.

## 2022-03-21 NOTE — TELEPHONE ENCOUNTER
Patient is seeing Lawrence Darby tomorrow, 3/22/22. Patients wants to know if she can bring her three adult children to the chemo education. Please call patient at [phone].

## 2022-03-21 NOTE — TELEPHONE ENCOUNTER
Spoke with patient. Reminded her that per covid guidelines and hospital policy, we are only allowing one visitor per patient. She verbalized understanding.

## 2022-03-22 ENCOUNTER — APPOINTMENT (OUTPATIENT)
Dept: HEMATOLOGY/ONCOLOGY | Facility: HOSPITAL | Age: 72
End: 2022-03-22
Attending: INTERNAL MEDICINE
Payer: MEDICARE

## 2022-03-22 DIAGNOSIS — Z71.9 HEALTH EDUCATION/COUNSELING: ICD-10-CM

## 2022-03-22 DIAGNOSIS — C25.7 MALIGNANT NEOPLASM OF OTHER PARTS OF PANCREAS (HCC): Primary | ICD-10-CM

## 2022-03-22 PROCEDURE — 99215 OFFICE O/P EST HI 40 MIN: CPT | Performed by: NURSE PRACTITIONER

## 2022-03-22 NOTE — PROGRESS NOTES
Patient presents with:  Pt Ed: Chemo Education    Patient presents to clinic with spouse for [place]. Patient is scheduled to start treatment on 4/5/22. Medication list reviewed with patient.     Education Record    Learner:  Patient and Spouse    Disease / Diagnosis:     Barriers / Limitations:  None   Comments:    Method:  Brief focused   Comments:    General Topics:  Medication   Comments:    Outcome:  Shows understanding   Comments:

## 2022-03-29 ENCOUNTER — OFFICE VISIT (OUTPATIENT)
Dept: HEMATOLOGY/ONCOLOGY | Facility: HOSPITAL | Age: 72
End: 2022-03-29
Attending: INTERNAL MEDICINE
Payer: MEDICARE

## 2022-03-29 ENCOUNTER — SOCIAL WORK SERVICES (OUTPATIENT)
Dept: HEMATOLOGY/ONCOLOGY | Facility: HOSPITAL | Age: 72
End: 2022-03-29

## 2022-03-29 VITALS
RESPIRATION RATE: 18 BRPM | SYSTOLIC BLOOD PRESSURE: 149 MMHG | OXYGEN SATURATION: 96 % | DIASTOLIC BLOOD PRESSURE: 72 MMHG | TEMPERATURE: 99 F | BODY MASS INDEX: 33.06 KG/M2 | HEIGHT: 58.43 IN | HEART RATE: 79 BPM | WEIGHT: 159.63 LBS

## 2022-03-29 DIAGNOSIS — T50.905A ITCHING DUE TO DRUG: ICD-10-CM

## 2022-03-29 DIAGNOSIS — C25.7 MALIGNANT NEOPLASM OF OTHER PARTS OF PANCREAS (HCC): Primary | ICD-10-CM

## 2022-03-29 DIAGNOSIS — L29.8 ITCHING DUE TO DRUG: ICD-10-CM

## 2022-03-29 LAB
ALBUMIN SERPL-MCNC: 3.6 G/DL (ref 3.4–5)
ALP LIVER SERPL-CCNC: 67 U/L
ALT SERPL-CCNC: 27 U/L
AST SERPL-CCNC: 22 U/L (ref 15–37)
BASOPHILS # BLD AUTO: 0.05 X10(3) UL (ref 0–0.2)
BASOPHILS NFR BLD AUTO: 1 %
BILIRUB SERPL-MCNC: 0.4 MG/DL (ref 0.1–2)
BUN BLD-MCNC: 13 MG/DL (ref 7–18)
CALCIUM BLD-MCNC: 9.2 MG/DL (ref 8.5–10.1)
CANCER AG19-9 SERPL-ACNC: ABNORMAL U/ML (ref ?–37)
CHLORIDE SERPL-SCNC: 106 MMOL/L (ref 98–112)
CO2 SERPL-SCNC: 26 MMOL/L (ref 21–32)
CREAT BLD-MCNC: 0.96 MG/DL
EOSINOPHIL NFR BLD AUTO: 3.6 %
ERYTHROCYTE [DISTWIDTH] IN BLOOD BY AUTOMATED COUNT: 16.7 %
GLOBULIN PLAS-MCNC: 3.4 G/DL (ref 2.8–4.4)
GLUCOSE BLD-MCNC: 124 MG/DL (ref 70–99)
HCT VFR BLD AUTO: 30.9 %
HGB BLD-MCNC: 9.8 G/DL
IMM GRANULOCYTES # BLD AUTO: 0.03 X10(3) UL (ref 0–1)
IMM GRANULOCYTES NFR BLD: 0.6 %
LYMPHOCYTES # BLD AUTO: 0.81 X10(3) UL (ref 1–4)
LYMPHOCYTES NFR BLD AUTO: 16.1 %
MCH RBC QN AUTO: 31.5 PG (ref 26–34)
MCHC RBC AUTO-ENTMCNC: 31.7 G/DL (ref 31–37)
MCV RBC AUTO: 99.4 FL
MONOCYTES # BLD AUTO: 1.07 X10(3) UL (ref 0.1–1)
MONOCYTES NFR BLD AUTO: 21.2 %
NEUTROPHILS # BLD AUTO: 2.9 X10 (3) UL (ref 1.5–7.7)
NEUTROPHILS # BLD AUTO: 2.9 X10(3) UL (ref 1.5–7.7)
NEUTROPHILS NFR BLD AUTO: 57.5 %
OSMOLALITY SERPL CALC.SUM OF ELEC: 290 MOSM/KG (ref 275–295)
PLATELET # BLD AUTO: 297 10(3)UL (ref 150–450)
POTASSIUM SERPL-SCNC: 3.5 MMOL/L (ref 3.5–5.1)
PROT SERPL-MCNC: 7 G/DL (ref 6.4–8.2)
RBC # BLD AUTO: 3.11 X10(6)UL
SODIUM SERPL-SCNC: 139 MMOL/L (ref 136–145)
WBC # BLD AUTO: 5 X10(3) UL (ref 4–11)

## 2022-03-29 PROCEDURE — 96368 THER/DIAG CONCURRENT INF: CPT

## 2022-03-29 PROCEDURE — 96549 UNLISTED CHEMOTHERAPY PX: CPT

## 2022-03-29 PROCEDURE — 96367 TX/PROPH/DG ADDL SEQ IV INF: CPT

## 2022-03-29 PROCEDURE — 96415 CHEMO IV INFUSION ADDL HR: CPT

## 2022-03-29 PROCEDURE — 99213 OFFICE O/P EST LOW 20 MIN: CPT | Performed by: INTERNAL MEDICINE

## 2022-03-29 PROCEDURE — 80053 COMPREHEN METABOLIC PANEL: CPT

## 2022-03-29 PROCEDURE — 85025 COMPLETE CBC W/AUTO DIFF WBC: CPT

## 2022-03-29 PROCEDURE — 96375 TX/PRO/DX INJ NEW DRUG ADDON: CPT

## 2022-03-29 PROCEDURE — 96413 CHEMO IV INFUSION 1 HR: CPT

## 2022-03-29 PROCEDURE — 86301 IMMUNOASSAY TUMOR CA 19-9: CPT | Performed by: INTERNAL MEDICINE

## 2022-03-29 RX ORDER — ATROPINE SULFATE 0.4 MG/ML
0.2 AMPUL (ML) INJECTION ONCE
Status: CANCELLED | OUTPATIENT
Start: 2022-03-29

## 2022-03-29 RX ORDER — DIPHENHYDRAMINE HYDROCHLORIDE 50 MG/ML
50 INJECTION INTRAMUSCULAR; INTRAVENOUS ONCE
Status: COMPLETED | OUTPATIENT
Start: 2022-03-29 | End: 2022-03-29

## 2022-03-29 RX ORDER — ATROPINE SULFATE 0.4 MG/ML
0.2 AMPUL (ML) INJECTION ONCE
Status: COMPLETED | OUTPATIENT
Start: 2022-03-29 | End: 2022-03-29

## 2022-03-29 RX ORDER — FLUOROURACIL 50 MG/ML
2400 INJECTION, SOLUTION INTRAVENOUS CONTINUOUS
Status: CANCELLED | OUTPATIENT
Start: 2022-03-29

## 2022-03-29 RX ORDER — FLUOROURACIL 50 MG/ML
2400 INJECTION, SOLUTION INTRAVENOUS CONTINUOUS
Status: DISCONTINUED | OUTPATIENT
Start: 2022-03-29 | End: 2022-03-29

## 2022-03-29 RX ADMIN — ATROPINE SULFATE 0.2 MG: 0.4 MG/ML AMPUL (ML) INJECTION at 11:38:00

## 2022-03-29 RX ADMIN — FLUOROURACIL 4000 MG: 50 INJECTION, SOLUTION INTRAVENOUS at 15:06:00

## 2022-03-29 RX ADMIN — DIPHENHYDRAMINE HYDROCHLORIDE 50 MG: 50 INJECTION INTRAMUSCULAR; INTRAVENOUS at 12:41:00

## 2022-03-29 NOTE — PROGRESS NOTES
Pt here for C1D1 FOLFIRINOX. Arrives Ambulating independently, accompanied by Spouse           Pregnancy screening: Denies possibility of pregnancy    Modifications in dose or schedule: No    Medication involved - FOLFIRINOX    Grade of reaction, patient symptoms at time of reaction - generalized itching- no hives or flushing. No other symptoms. MD/BRENT/PA called to room, orders received - APN ordered 50mg IV benadryl with good relief. Outcome of reaction - re-challenged patient  Patient tolerated rest of infusion well. No issues.        Frequency of blood return and site check throughout administration: Prior to administration, Prior to each drug, Every 2-3 ml IVP and At completion of therapy   Discharged to Home, Ambulating independently, accompanied by:Spouse    Outpatient Oncology Care Plan  Problem list:  knowledge deficit  Problems related to:    chemotherapy  Interventions:  patient/family supported  spiritual support given  promoted rest  provided general teaching  Expected outcomes:  patient supported/coping well  safe in environment  understands plan of care  Progress towards outcome:  making progress    Education Record    Learner:  Patient  Barriers / Limitations:  None  Method:  Discussion  Outcome:  Shows understanding  Comments:

## 2022-03-29 NOTE — PROGRESS NOTES
Outpatient Oncology Care Plan   Problem list:   knowledge deficit   Problems related to:   chemotherapy   Interventions:   provided general teaching   Expected outcomes:   understands plan of care   Progress towards outcome: making progress     Education Record   Learner: Patient   Barriers / Limitations: None   Method: Discussion   Outcome: Shows understanding   Comments:  Patient here for follow-up and planned C1D1 treatment. Having increased nausea and abdominal discomfort. Energy levels are somewhat low. Increased appetite loss.

## 2022-03-30 ENCOUNTER — TELEPHONE (OUTPATIENT)
Dept: HEMATOLOGY/ONCOLOGY | Facility: HOSPITAL | Age: 72
End: 2022-03-30

## 2022-03-30 NOTE — TELEPHONE ENCOUNTER
Toxicities: C1 D1 Irinotecan/Leucovorin/Oxaliplatin on 3/30/2022    Mahendra Belle said she had a headache after she got home. It lasted during the night. Today she feels better. She also has some slight nausea. She to zofran and it resolved. I encouraged Mahendra Belle to please call the office if she is not feeling well or has any questions for concerns. She agreed and thanked me for checking on her.

## 2022-03-31 ENCOUNTER — OFFICE VISIT (OUTPATIENT)
Dept: HEMATOLOGY/ONCOLOGY | Facility: HOSPITAL | Age: 72
End: 2022-03-31
Attending: INTERNAL MEDICINE
Payer: MEDICARE

## 2022-03-31 VITALS
OXYGEN SATURATION: 95 % | RESPIRATION RATE: 16 BRPM | HEIGHT: 58.43 IN | DIASTOLIC BLOOD PRESSURE: 79 MMHG | SYSTOLIC BLOOD PRESSURE: 150 MMHG | WEIGHT: 161.38 LBS | HEART RATE: 81 BPM | TEMPERATURE: 97 F | BODY MASS INDEX: 33.42 KG/M2

## 2022-03-31 DIAGNOSIS — C25.7 MALIGNANT NEOPLASM OF OTHER PARTS OF PANCREAS (HCC): Primary | ICD-10-CM

## 2022-03-31 PROCEDURE — 96372 THER/PROPH/DIAG INJ SC/IM: CPT

## 2022-03-31 NOTE — PROGRESS NOTES
Education Record    Learner:  Patient    Disease / Diagnosis: pancreatic cancer: pump disconnect and ziextenzo    Barriers / Limitations:  None   Comments:    Method:  Brief focused and Discussion   Comments:    General Topics:  Infection, Medication, Precautions, Side effects and symptom management and Plan of care reviewed   Comments:    Outcome:  Shows understanding   Comments:  Patient here for pump disconnect. Pt reports some mild nausea and fatigue, now resolved. Writer educated patient regarding ziextenzo. Injection given and pt discharged in stable condition.

## 2022-04-05 ENCOUNTER — OFFICE VISIT (OUTPATIENT)
Dept: HEMATOLOGY/ONCOLOGY | Facility: HOSPITAL | Age: 72
End: 2022-04-05
Attending: INTERNAL MEDICINE
Payer: MEDICARE

## 2022-04-05 VITALS
OXYGEN SATURATION: 95 % | DIASTOLIC BLOOD PRESSURE: 85 MMHG | WEIGHT: 155.81 LBS | SYSTOLIC BLOOD PRESSURE: 193 MMHG | BODY MASS INDEX: 32.26 KG/M2 | HEIGHT: 58.43 IN | HEART RATE: 105 BPM | RESPIRATION RATE: 16 BRPM | TEMPERATURE: 97 F

## 2022-04-05 DIAGNOSIS — C25.7 MALIGNANT NEOPLASM OF OTHER PARTS OF PANCREAS (HCC): Primary | ICD-10-CM

## 2022-04-05 PROCEDURE — 99215 OFFICE O/P EST HI 40 MIN: CPT | Performed by: NURSE PRACTITIONER

## 2022-04-05 NOTE — PROGRESS NOTES
Patient presents with: Follow - Up: APN assessment - Day 8    Pt is here for follow up - Day 8 folfirinox. Pt reports that she had a rough week. Decreased appetite but still forced herself to eat; pushing hydration. Some nausea and belching; denies vomiting. Was taking miralax every other day for bowel management and had diarrhea - using imodium for relief. Tingling in hands/legs; increased fatigue and low energy; some improvement today.      Education Record    Learner:  Patient and Spouse    Disease / Diagnosis: pancreatic cancer    Barriers / Limitations:  None   Comments:    Method:  Brief focused   Comments:    General Topics:  Diet, Medication, Pain, Side effects and symptom management and Plan of care reviewed   Comments:    Outcome:  Shows understanding   Comments:

## 2022-04-08 ENCOUNTER — TELEPHONE (OUTPATIENT)
Dept: FAMILY MEDICINE CLINIC | Facility: CLINIC | Age: 72
End: 2022-04-08

## 2022-04-08 RX ORDER — MORPHINE SULFATE 15 MG/1
15 TABLET, FILM COATED, EXTENDED RELEASE ORAL EVERY 12 HOURS SCHEDULED
Qty: 60 TABLET | Refills: 0 | Status: SHIPPED | OUTPATIENT
Start: 2022-04-08 | End: 2022-05-08

## 2022-04-09 NOTE — TELEPHONE ENCOUNTER
Spoke to Dr. Juan Bennett. He is ok with a phone visit. Pt notified and said she is very appreciative. She states she has been very tired since going to the cancer center.

## 2022-04-11 ENCOUNTER — VIRTUAL PHONE E/M (OUTPATIENT)
Dept: FAMILY MEDICINE CLINIC | Facility: CLINIC | Age: 72
End: 2022-04-11
Payer: MEDICARE

## 2022-04-11 VITALS — SYSTOLIC BLOOD PRESSURE: 134 MMHG | DIASTOLIC BLOOD PRESSURE: 76 MMHG

## 2022-04-11 DIAGNOSIS — C25.7 MALIGNANT NEOPLASM OF OTHER PARTS OF PANCREAS (HCC): Primary | ICD-10-CM

## 2022-04-11 DIAGNOSIS — F41.8 ANXIETY WITH DEPRESSION: ICD-10-CM

## 2022-04-11 DIAGNOSIS — I10 BENIGN HYPERTENSION: ICD-10-CM

## 2022-04-12 ENCOUNTER — OFFICE VISIT (OUTPATIENT)
Dept: HEMATOLOGY/ONCOLOGY | Facility: HOSPITAL | Age: 72
End: 2022-04-12
Attending: INTERNAL MEDICINE
Payer: MEDICARE

## 2022-04-12 VITALS
DIASTOLIC BLOOD PRESSURE: 95 MMHG | WEIGHT: 156.19 LBS | RESPIRATION RATE: 18 BRPM | HEIGHT: 58.43 IN | BODY MASS INDEX: 32.35 KG/M2 | TEMPERATURE: 98 F | OXYGEN SATURATION: 97 % | HEART RATE: 101 BPM | SYSTOLIC BLOOD PRESSURE: 173 MMHG

## 2022-04-12 DIAGNOSIS — C25.7 MALIGNANT NEOPLASM OF OTHER PARTS OF PANCREAS (HCC): Primary | ICD-10-CM

## 2022-04-12 DIAGNOSIS — G62.0 DRUG-INDUCED PERIPHERAL NEUROPATHY (HCC): Primary | ICD-10-CM

## 2022-04-12 DIAGNOSIS — C25.7 MALIGNANT NEOPLASM OF OTHER PARTS OF PANCREAS (HCC): ICD-10-CM

## 2022-04-12 LAB
ALBUMIN SERPL-MCNC: 3.5 G/DL (ref 3.4–5)
ALBUMIN/GLOB SERPL: 1 {RATIO} (ref 1–2)
ALP LIVER SERPL-CCNC: 111 U/L
ALT SERPL-CCNC: 26 U/L
ANION GAP SERPL CALC-SCNC: 5 MMOL/L (ref 0–18)
AST SERPL-CCNC: 21 U/L (ref 15–37)
BASOPHILS # BLD AUTO: 0.08 X10(3) UL (ref 0–0.2)
BASOPHILS NFR BLD AUTO: 0.6 %
BILIRUB SERPL-MCNC: 0.3 MG/DL (ref 0.1–2)
BUN BLD-MCNC: 12 MG/DL (ref 7–18)
CALCIUM BLD-MCNC: 8.9 MG/DL (ref 8.5–10.1)
CANCER AG19-9 SERPL-ACNC: ABNORMAL U/ML (ref ?–37)
CHLORIDE SERPL-SCNC: 108 MMOL/L (ref 98–112)
CO2 SERPL-SCNC: 26 MMOL/L (ref 21–32)
CREAT BLD-MCNC: 1.01 MG/DL
EOSINOPHIL # BLD AUTO: 0.15 X10(3) UL (ref 0–0.7)
EOSINOPHIL NFR BLD AUTO: 1.1 %
ERYTHROCYTE [DISTWIDTH] IN BLOOD BY AUTOMATED COUNT: 15.9 %
FASTING STATUS PATIENT QL REPORTED: NO
GLOBULIN PLAS-MCNC: 3.6 G/DL (ref 2.8–4.4)
GLUCOSE BLD-MCNC: 138 MG/DL (ref 70–99)
HCT VFR BLD AUTO: 31.4 %
HGB BLD-MCNC: 9.8 G/DL
IMM GRANULOCYTES # BLD AUTO: 0.31 X10(3) UL (ref 0–1)
IMM GRANULOCYTES NFR BLD: 2.3 %
LYMPHOCYTES # BLD AUTO: 1.16 X10(3) UL (ref 1–4)
LYMPHOCYTES NFR BLD AUTO: 8.5 %
MCH RBC QN AUTO: 31.5 PG (ref 26–34)
MCHC RBC AUTO-ENTMCNC: 31.2 G/DL (ref 31–37)
MCV RBC AUTO: 101 FL
MONOCYTES # BLD AUTO: 1.03 X10(3) UL (ref 0.1–1)
MONOCYTES NFR BLD AUTO: 7.6 %
NEUTROPHILS # BLD AUTO: 10.89 X10 (3) UL (ref 1.5–7.7)
NEUTROPHILS # BLD AUTO: 10.89 X10(3) UL (ref 1.5–7.7)
NEUTROPHILS NFR BLD AUTO: 79.9 %
OSMOLALITY SERPL CALC.SUM OF ELEC: 290 MOSM/KG (ref 275–295)
PLATELET # BLD AUTO: 128 10(3)UL (ref 150–450)
POTASSIUM SERPL-SCNC: 3.6 MMOL/L (ref 3.5–5.1)
PROT SERPL-MCNC: 7.1 G/DL (ref 6.4–8.2)
RBC # BLD AUTO: 3.11 X10(6)UL
SODIUM SERPL-SCNC: 139 MMOL/L (ref 136–145)
WBC # BLD AUTO: 13.6 X10(3) UL (ref 4–11)

## 2022-04-12 PROCEDURE — 96368 THER/DIAG CONCURRENT INF: CPT

## 2022-04-12 PROCEDURE — 86301 IMMUNOASSAY TUMOR CA 19-9: CPT | Performed by: INTERNAL MEDICINE

## 2022-04-12 PROCEDURE — 96413 CHEMO IV INFUSION 1 HR: CPT

## 2022-04-12 PROCEDURE — 96549 UNLISTED CHEMOTHERAPY PX: CPT

## 2022-04-12 PROCEDURE — 96367 TX/PROPH/DG ADDL SEQ IV INF: CPT

## 2022-04-12 PROCEDURE — 96415 CHEMO IV INFUSION ADDL HR: CPT

## 2022-04-12 PROCEDURE — 85025 COMPLETE CBC W/AUTO DIFF WBC: CPT

## 2022-04-12 PROCEDURE — 96375 TX/PRO/DX INJ NEW DRUG ADDON: CPT

## 2022-04-12 PROCEDURE — 99215 OFFICE O/P EST HI 40 MIN: CPT | Performed by: INTERNAL MEDICINE

## 2022-04-12 PROCEDURE — 80053 COMPREHEN METABOLIC PANEL: CPT

## 2022-04-12 RX ORDER — ATROPINE SULFATE 0.4 MG/ML
0.2 AMPUL (ML) INJECTION ONCE
Status: COMPLETED | OUTPATIENT
Start: 2022-04-12 | End: 2022-04-12

## 2022-04-12 RX ORDER — FLUOROURACIL 50 MG/ML
2400 INJECTION, SOLUTION INTRAVENOUS CONTINUOUS
Status: DISCONTINUED | OUTPATIENT
Start: 2022-04-12 | End: 2022-04-12

## 2022-04-12 RX ORDER — ATROPINE SULFATE 0.4 MG/ML
0.2 AMPUL (ML) INJECTION ONCE
Status: CANCELLED | OUTPATIENT
Start: 2022-04-12

## 2022-04-12 RX ORDER — FLUOROURACIL 50 MG/ML
2400 INJECTION, SOLUTION INTRAVENOUS CONTINUOUS
Status: CANCELLED | OUTPATIENT
Start: 2022-04-12

## 2022-04-12 RX ADMIN — FLUOROURACIL 4000 MG: 50 INJECTION, SOLUTION INTRAVENOUS at 13:46:00

## 2022-04-12 RX ADMIN — ATROPINE SULFATE 0.2 MG: 0.4 MG/ML AMPUL (ML) INJECTION at 10:38:00

## 2022-04-12 NOTE — PROGRESS NOTES
Outpatient Oncology Care Plan   Problem list:   fatigue  knowledge deficit   Problems related to:   chemotherapy   Interventions:   provided general teaching   Expected outcomes:   understands plan of care   Progress towards outcome: unchanged     Education Record   Learner: Patient   Barriers / Limitations: None   Method: Discussion   Outcome: Shows understanding   Comments:  Patient here for follow-up and planned C2D1 treatment. Energy levels slowly improving. Neuropathy and cold sensitivity resolved as of now. Denies any current bowel issues. Nausea managed with anti-emetics.

## 2022-04-12 NOTE — PROGRESS NOTES
Pt here for C2D1 FOLFIRINOX. Arrives Ambulating independently, accompanied by Spouse           Pregnancy screening: Denies possibility of pregnancy    Modifications in dose or schedule: No    Medication involved - FOLFIRINOX    Grade of reaction, patient symptoms at time of reaction - patient finished Irinotecan infusion and had 18 min left of oxali/leuc when she started c/o muscle twitching in shoulders, neck, arms, and legs. Medication stopped at 1225. Patient stated sx's started 10 min. prior. Vital Signs taken - /80, HR - 82, SpO2 - 98%    MD/SREEN/PA called to room, orders received - Barbara Salinas NP    Outcome of reaction - re-challenged patient at 1318. Frequency of blood return and site check throughout administration: Prior to administration, Prior to each drug and At completion of therapy   Discharged to home, Ambulating independently, accompanied by:Spouse    Outpatient Oncology Care Plan  Problem list:  fatigue  knowledge deficit  muscle twitching  Problems related to:    chemotherapy  disease/disease progression  side effect of treatment  Interventions:  maintain safe environment  encourage activity as tolerated  promoted rest  provided general teaching  Expected outcomes:  adequate sleep/rest  safe in environment  symptoms relieved/minimized  understands plan of care  Progress towards outcome:  making progress    Education Record    Learner:  Patient and Spouse  Barriers / Limitations:  None  Method:  Brief focused and Reinforcement  Outcome:  Shows understanding  Comments: Patient said she still felt slight muscle twitching prior to leaving but they were getting better. Patient discharged with  in stable condition.

## 2022-04-14 ENCOUNTER — OFFICE VISIT (OUTPATIENT)
Dept: HEMATOLOGY/ONCOLOGY | Facility: HOSPITAL | Age: 72
End: 2022-04-14
Attending: INTERNAL MEDICINE
Payer: MEDICARE

## 2022-04-14 DIAGNOSIS — C25.7 MALIGNANT NEOPLASM OF OTHER PARTS OF PANCREAS (HCC): Primary | ICD-10-CM

## 2022-04-14 PROCEDURE — 96372 THER/PROPH/DIAG INJ SC/IM: CPT

## 2022-04-14 NOTE — PROGRESS NOTES
Pt here for C2D3 ziextenzo. Arrives Ambulating independently, accompanied by Self           Pregnancy screening: Denies possibility of pregnancy    Modifications in dose or schedule: No     Frequency of blood return and site check throughout administration: Prior to administration   Discharged to Home, Ambulating independently, accompanied by:Self    Outpatient Oncology Care Plan  Problem list:  knowledge deficit  nausea and vomiting  Problems related to:    chemotherapy  side effect of treatment  Interventions:  emotional support given  maintain safe environment  nausea/vomiting teaching  monitor effect of nausea/vomiting management  promoted rest  Expected outcomes:  adequate sleep/rest  patient supported/coping well  safe in environment  understands plan of care  Progress towards outcome:  making progress    Education Record    Learner:  Patient  Barriers / Limitations:  None  Method:  Discussion  Outcome:  Shows understanding  Comments:    Here for pump disconnect and Allyne Floro. Allyne Floro given per MD order without incident. 5FU infused without incident. Pt reports some mild nausea with some relief with antiemetics. Reviewed next appointment. Discharged home in stable condition, no new complaints.

## 2022-04-26 ENCOUNTER — SOCIAL WORK SERVICES (OUTPATIENT)
Dept: HEMATOLOGY/ONCOLOGY | Facility: HOSPITAL | Age: 72
End: 2022-04-26

## 2022-04-26 ENCOUNTER — OFFICE VISIT (OUTPATIENT)
Dept: HEMATOLOGY/ONCOLOGY | Facility: HOSPITAL | Age: 72
End: 2022-04-26
Attending: INTERNAL MEDICINE
Payer: MEDICARE

## 2022-04-26 VITALS
OXYGEN SATURATION: 96 % | TEMPERATURE: 98 F | DIASTOLIC BLOOD PRESSURE: 93 MMHG | HEART RATE: 95 BPM | RESPIRATION RATE: 18 BRPM | BODY MASS INDEX: 30.82 KG/M2 | WEIGHT: 148.81 LBS | HEIGHT: 58.43 IN | SYSTOLIC BLOOD PRESSURE: 170 MMHG

## 2022-04-26 DIAGNOSIS — C25.7 MALIGNANT NEOPLASM OF OTHER PARTS OF PANCREAS (HCC): Primary | ICD-10-CM

## 2022-04-26 DIAGNOSIS — R11.0 NAUSEA: ICD-10-CM

## 2022-04-26 DIAGNOSIS — C25.9 MALIGNANT NEOPLASM OF PANCREAS, UNSPECIFIED LOCATION OF MALIGNANCY (HCC): ICD-10-CM

## 2022-04-26 LAB
ALBUMIN SERPL-MCNC: 3.5 G/DL (ref 3.4–5)
ALBUMIN/GLOB SERPL: 0.9 {RATIO} (ref 1–2)
ALP LIVER SERPL-CCNC: 117 U/L
ALT SERPL-CCNC: 24 U/L
ANION GAP SERPL CALC-SCNC: 7 MMOL/L (ref 0–18)
AST SERPL-CCNC: 17 U/L (ref 15–37)
BASOPHILS # BLD: 0 X10(3) UL (ref 0–0.2)
BASOPHILS NFR BLD: 0 %
BILIRUB SERPL-MCNC: 0.2 MG/DL (ref 0.1–2)
BUN BLD-MCNC: 22 MG/DL (ref 7–18)
CALCIUM BLD-MCNC: 8.9 MG/DL (ref 8.5–10.1)
CANCER AG19-9 SERPL-ACNC: ABNORMAL U/ML (ref ?–37)
CHLORIDE SERPL-SCNC: 109 MMOL/L (ref 98–112)
CO2 SERPL-SCNC: 24 MMOL/L (ref 21–32)
CREAT BLD-MCNC: 1.24 MG/DL
EOSINOPHIL # BLD: 0 X10(3) UL (ref 0–0.7)
EOSINOPHIL NFR BLD: 0 %
ERYTHROCYTE [DISTWIDTH] IN BLOOD BY AUTOMATED COUNT: 15.7 %
FASTING STATUS PATIENT QL REPORTED: NO
GLOBULIN PLAS-MCNC: 3.7 G/DL (ref 2.8–4.4)
GLUCOSE BLD-MCNC: 117 MG/DL (ref 70–99)
HCT VFR BLD AUTO: 30.3 %
HGB BLD-MCNC: 9.7 G/DL
LYMPHOCYTES NFR BLD: 0.52 X10(3) UL (ref 1–4)
LYMPHOCYTES NFR BLD: 4 %
MCH RBC QN AUTO: 31.5 PG (ref 26–34)
MCHC RBC AUTO-ENTMCNC: 32 G/DL (ref 31–37)
MCV RBC AUTO: 98.4 FL
MONOCYTES # BLD: 1.43 X10(3) UL (ref 0.1–1)
MONOCYTES NFR BLD: 11 %
MORPHOLOGY: NORMAL
NEUTROPHILS # BLD AUTO: 9.98 X10 (3) UL (ref 1.5–7.7)
NEUTROPHILS NFR BLD: 82 %
NEUTS BAND NFR BLD: 3 %
NEUTS HYPERSEG # BLD: 11.05 X10(3) UL (ref 1.5–7.7)
OSMOLALITY SERPL CALC.SUM OF ELEC: 294 MOSM/KG (ref 275–295)
PLATELET # BLD AUTO: 168 10(3)UL (ref 150–450)
PLATELET MORPHOLOGY: NORMAL
POTASSIUM SERPL-SCNC: 3.7 MMOL/L (ref 3.5–5.1)
PROT SERPL-MCNC: 7.2 G/DL (ref 6.4–8.2)
RBC # BLD AUTO: 3.08 X10(6)UL
SODIUM SERPL-SCNC: 140 MMOL/L (ref 136–145)
TOTAL CELLS COUNTED BLD: 100
WBC # BLD AUTO: 13 X10(3) UL (ref 4–11)

## 2022-04-26 PROCEDURE — 80053 COMPREHEN METABOLIC PANEL: CPT

## 2022-04-26 PROCEDURE — 85007 BL SMEAR W/DIFF WBC COUNT: CPT

## 2022-04-26 PROCEDURE — 85027 COMPLETE CBC AUTOMATED: CPT

## 2022-04-26 PROCEDURE — 96549 UNLISTED CHEMOTHERAPY PX: CPT

## 2022-04-26 PROCEDURE — 96375 TX/PRO/DX INJ NEW DRUG ADDON: CPT

## 2022-04-26 PROCEDURE — 85025 COMPLETE CBC W/AUTO DIFF WBC: CPT

## 2022-04-26 PROCEDURE — 99215 OFFICE O/P EST HI 40 MIN: CPT | Performed by: INTERNAL MEDICINE

## 2022-04-26 PROCEDURE — 96367 TX/PROPH/DG ADDL SEQ IV INF: CPT

## 2022-04-26 PROCEDURE — 96361 HYDRATE IV INFUSION ADD-ON: CPT

## 2022-04-26 PROCEDURE — 96415 CHEMO IV INFUSION ADDL HR: CPT

## 2022-04-26 PROCEDURE — 96368 THER/DIAG CONCURRENT INF: CPT

## 2022-04-26 PROCEDURE — 86301 IMMUNOASSAY TUMOR CA 19-9: CPT | Performed by: INTERNAL MEDICINE

## 2022-04-26 PROCEDURE — 96413 CHEMO IV INFUSION 1 HR: CPT

## 2022-04-26 RX ORDER — FLUOROURACIL 50 MG/ML
2400 INJECTION, SOLUTION INTRAVENOUS CONTINUOUS
Status: CANCELLED | OUTPATIENT
Start: 2022-04-26

## 2022-04-26 RX ORDER — DIPHENOXYLATE HYDROCHLORIDE AND ATROPINE SULFATE 2.5; .025 MG/1; MG/1
1-2 TABLET ORAL 4 TIMES DAILY PRN
Qty: 30 TABLET | Refills: 0 | Status: SHIPPED | OUTPATIENT
Start: 2022-04-26

## 2022-04-26 RX ORDER — ONDANSETRON HYDROCHLORIDE 8 MG/1
8 TABLET, FILM COATED ORAL EVERY 8 HOURS PRN
Qty: 90 TABLET | Refills: 3 | Status: SHIPPED | OUTPATIENT
Start: 2022-04-26

## 2022-04-26 RX ORDER — ATROPINE SULFATE 0.4 MG/ML
0.4 AMPUL (ML) INJECTION ONCE
Status: COMPLETED | OUTPATIENT
Start: 2022-04-26 | End: 2022-04-26

## 2022-04-26 RX ORDER — FLUOROURACIL 50 MG/ML
2400 INJECTION, SOLUTION INTRAVENOUS CONTINUOUS
Status: DISCONTINUED | OUTPATIENT
Start: 2022-04-26 | End: 2022-04-26

## 2022-04-26 RX ADMIN — FLUOROURACIL 4000 MG: 50 INJECTION, SOLUTION INTRAVENOUS at 16:06:00

## 2022-04-26 RX ADMIN — ATROPINE SULFATE 0.4 MG: 0.4 MG/ML AMPUL (ML) INJECTION at 13:49:00

## 2022-04-26 NOTE — PROGRESS NOTES
Pt here for C3D1 FOLFIRINOX. Arrives Ambulating independently, accompanied by Spouse           Pregnancy screening: Denies possibility of pregnancy    Modifications in dose or schedule: No     Frequency of blood return and site check throughout administration: Prior to administration and At completion of therapy   Discharged to Home, Ambulating independently, accompanied by:Spouse    Outpatient Oncology Care Plan  Problem list:  diarrhea  ineffective patient coping  fatigue  knowledge deficit  nausea and vomiting  peripheral neuropathy   Muscle twitching  Problems related to:    chemotherapy  disease/disease progression  side effect of treatment  Interventions:  emotional support given  maintain safe environment  patient/family supported  encourage activity as tolerated  monitor effect of nausea/vomiting management  monitor lab values  promoted rest  provided general teaching  Expected outcomes:  adequate sleep/rest  patient supported/coping well  safe in environment  symptoms relieved/minimized  understands plan of care  Progress towards outcome:  making progress    Education Record    Learner:  Patient  Barriers / Limitations:  None  Method:  Brief focused and Reinforcement  Outcome:  Shows understanding  Comments: During treatment patient c/o mild muscle twitching but stated not as bad as last time and they minimized when treatment was stopped. Otherwise, patient tolerated treatment and discharged in stable condition. Patient will return on Thursday for pump disconnect, ziextenzo injection, and IVF with Zofran/Dex per Dr. Larissa Carver.

## 2022-04-26 NOTE — PROGRESS NOTES
Pt here for follow up and chemo tx. Pt states after her last chemo tx, she was very sick. Pt c/o nausea, vomiting, fatigue and diarrhea.

## 2022-04-28 ENCOUNTER — OFFICE VISIT (OUTPATIENT)
Dept: HEMATOLOGY/ONCOLOGY | Facility: HOSPITAL | Age: 72
End: 2022-04-28
Attending: INTERNAL MEDICINE
Payer: MEDICARE

## 2022-04-28 VITALS
TEMPERATURE: 98 F | HEART RATE: 87 BPM | BODY MASS INDEX: 30.82 KG/M2 | DIASTOLIC BLOOD PRESSURE: 83 MMHG | RESPIRATION RATE: 18 BRPM | WEIGHT: 148.81 LBS | SYSTOLIC BLOOD PRESSURE: 148 MMHG | OXYGEN SATURATION: 96 % | HEIGHT: 58.43 IN

## 2022-04-28 DIAGNOSIS — C25.7 MALIGNANT NEOPLASM OF OTHER PARTS OF PANCREAS (HCC): Primary | ICD-10-CM

## 2022-04-28 PROCEDURE — 96374 THER/PROPH/DIAG INJ IV PUSH: CPT

## 2022-04-28 PROCEDURE — 96372 THER/PROPH/DIAG INJ SC/IM: CPT

## 2022-04-28 PROCEDURE — 96361 HYDRATE IV INFUSION ADD-ON: CPT

## 2022-04-28 NOTE — PROGRESS NOTES
Education Record    Learner:  Patient    Disease / Diagnosis:IV fluids +ZOf/Dex    Barriers / Limitations:  None   Comments:    Method:  Brief focused   Comments:    General Topics:  Infection, Medication, Pain, Precautions, Procedure, Side effects and symptom management, Plan of care reviewed and Fall risk and prevention   Comments:    Outcome:  Shows understanding   Comments:

## 2022-05-03 ENCOUNTER — TELEPHONE (OUTPATIENT)
Dept: HEMATOLOGY/ONCOLOGY | Facility: HOSPITAL | Age: 72
End: 2022-05-03

## 2022-05-03 ENCOUNTER — OFFICE VISIT (OUTPATIENT)
Dept: HEMATOLOGY/ONCOLOGY | Facility: HOSPITAL | Age: 72
End: 2022-05-03
Attending: INTERNAL MEDICINE
Payer: MEDICARE

## 2022-05-03 VITALS
DIASTOLIC BLOOD PRESSURE: 70 MMHG | HEART RATE: 129 BPM | SYSTOLIC BLOOD PRESSURE: 106 MMHG | RESPIRATION RATE: 18 BRPM | WEIGHT: 139.38 LBS | OXYGEN SATURATION: 96 % | BODY MASS INDEX: 28.86 KG/M2 | HEIGHT: 58.43 IN | TEMPERATURE: 97 F

## 2022-05-03 DIAGNOSIS — R11.2 CINV (CHEMOTHERAPY-INDUCED NAUSEA AND VOMITING): ICD-10-CM

## 2022-05-03 DIAGNOSIS — C25.9 MALIGNANT NEOPLASM OF PANCREAS, UNSPECIFIED LOCATION OF MALIGNANCY (HCC): Primary | ICD-10-CM

## 2022-05-03 DIAGNOSIS — R19.7 DIARRHEA, UNSPECIFIED TYPE: ICD-10-CM

## 2022-05-03 DIAGNOSIS — E86.0 DEHYDRATION: ICD-10-CM

## 2022-05-03 DIAGNOSIS — G89.3 NEOPLASM RELATED PAIN: ICD-10-CM

## 2022-05-03 DIAGNOSIS — R11.0 NAUSEA: ICD-10-CM

## 2022-05-03 DIAGNOSIS — T45.1X5A CINV (CHEMOTHERAPY-INDUCED NAUSEA AND VOMITING): ICD-10-CM

## 2022-05-03 DIAGNOSIS — C25.7 MALIGNANT NEOPLASM OF OTHER PARTS OF PANCREAS (HCC): ICD-10-CM

## 2022-05-03 LAB
ALBUMIN SERPL-MCNC: 3.7 G/DL (ref 3.4–5)
ALBUMIN/GLOB SERPL: 0.9 {RATIO} (ref 1–2)
ALP LIVER SERPL-CCNC: 126 U/L
ALT SERPL-CCNC: 34 U/L
ANION GAP SERPL CALC-SCNC: 7 MMOL/L (ref 0–18)
AST SERPL-CCNC: 20 U/L (ref 15–37)
BASOPHILS # BLD: 0 X10(3) UL (ref 0–0.2)
BASOPHILS NFR BLD: 0 %
BILIRUB SERPL-MCNC: 0.4 MG/DL (ref 0.1–2)
BUN BLD-MCNC: 30 MG/DL (ref 7–18)
CALCIUM BLD-MCNC: 9.1 MG/DL (ref 8.5–10.1)
CHLORIDE SERPL-SCNC: 108 MMOL/L (ref 98–112)
CO2 SERPL-SCNC: 21 MMOL/L (ref 21–32)
CREAT BLD-MCNC: 1.35 MG/DL
EOSINOPHIL # BLD: 0 X10(3) UL (ref 0–0.7)
EOSINOPHIL NFR BLD: 0 %
ERYTHROCYTE [DISTWIDTH] IN BLOOD BY AUTOMATED COUNT: 15.5 %
GLOBULIN PLAS-MCNC: 4 G/DL (ref 2.8–4.4)
GLUCOSE BLD-MCNC: 133 MG/DL (ref 70–99)
HCT VFR BLD AUTO: 31.8 %
HGB BLD-MCNC: 10.3 G/DL
LYMPHOCYTES NFR BLD: 0.97 X10(3) UL (ref 1–4)
LYMPHOCYTES NFR BLD: 18 %
MAGNESIUM SERPL-MCNC: 1.9 MG/DL (ref 1.6–2.6)
MCH RBC QN AUTO: 31.1 PG (ref 26–34)
MCHC RBC AUTO-ENTMCNC: 32.4 G/DL (ref 31–37)
MCV RBC AUTO: 96.1 FL
MONOCYTES # BLD: 0 X10(3) UL (ref 0.1–1)
MONOCYTES NFR BLD: 0 %
NEUTROPHILS # BLD AUTO: 4.34 X10 (3) UL (ref 1.5–7.7)
NEUTROPHILS NFR BLD: 82 %
NEUTS HYPERSEG # BLD: 4.43 X10(3) UL (ref 1.5–7.7)
OSMOLALITY SERPL CALC.SUM OF ELEC: 290 MOSM/KG (ref 275–295)
PLATELET # BLD AUTO: 69 10(3)UL (ref 150–450)
PLATELET MORPHOLOGY: NORMAL
POTASSIUM SERPL-SCNC: 3.7 MMOL/L (ref 3.5–5.1)
PROT SERPL-MCNC: 7.7 G/DL (ref 6.4–8.2)
RBC # BLD AUTO: 3.31 X10(6)UL
SODIUM SERPL-SCNC: 136 MMOL/L (ref 136–145)
TOTAL CELLS COUNTED BLD: 100
WBC # BLD AUTO: 5.4 X10(3) UL (ref 4–11)

## 2022-05-03 PROCEDURE — 99215 OFFICE O/P EST HI 40 MIN: CPT | Performed by: NURSE PRACTITIONER

## 2022-05-03 PROCEDURE — 85025 COMPLETE CBC W/AUTO DIFF WBC: CPT

## 2022-05-03 PROCEDURE — 85027 COMPLETE CBC AUTOMATED: CPT

## 2022-05-03 PROCEDURE — 96374 THER/PROPH/DIAG INJ IV PUSH: CPT

## 2022-05-03 PROCEDURE — 85007 BL SMEAR W/DIFF WBC COUNT: CPT

## 2022-05-03 PROCEDURE — 96375 TX/PRO/DX INJ NEW DRUG ADDON: CPT

## 2022-05-03 PROCEDURE — 96361 HYDRATE IV INFUSION ADD-ON: CPT

## 2022-05-03 PROCEDURE — 83735 ASSAY OF MAGNESIUM: CPT

## 2022-05-03 PROCEDURE — 80053 COMPREHEN METABOLIC PANEL: CPT

## 2022-05-03 RX ORDER — HYDROMORPHONE HYDROCHLORIDE 1 MG/ML
0.5 INJECTION, SOLUTION INTRAMUSCULAR; INTRAVENOUS; SUBCUTANEOUS ONCE
Status: CANCELLED
Start: 2022-05-03 | End: 2022-05-03

## 2022-05-03 RX ORDER — HYDROMORPHONE HYDROCHLORIDE 1 MG/ML
0.5 INJECTION, SOLUTION INTRAMUSCULAR; INTRAVENOUS; SUBCUTANEOUS ONCE
Status: COMPLETED | OUTPATIENT
Start: 2022-05-03 | End: 2022-05-03

## 2022-05-03 RX ORDER — OLANZAPINE 5 MG/1
5 TABLET ORAL NIGHTLY
Qty: 30 TABLET | Refills: 2 | Status: SHIPPED | OUTPATIENT
Start: 2022-05-03 | End: 2022-05-03

## 2022-05-03 RX ORDER — FLUCONAZOLE 2 MG/ML
200 INJECTION INTRAVENOUS ONCE
Status: CANCELLED
Start: 2022-05-03 | End: 2022-05-03

## 2022-05-03 RX ORDER — FLUCONAZOLE 2 MG/ML
200 INJECTION INTRAVENOUS ONCE
Status: COMPLETED | OUTPATIENT
Start: 2022-05-03 | End: 2022-05-03

## 2022-05-03 RX ORDER — ONDANSETRON 8 MG/1
8 TABLET, ORALLY DISINTEGRATING ORAL EVERY 8 HOURS PRN
Qty: 15 TABLET | Refills: 1 | Status: SHIPPED | OUTPATIENT
Start: 2022-05-03

## 2022-05-03 RX ORDER — OLANZAPINE 5 MG/1
5 TABLET ORAL NIGHTLY
Qty: 30 TABLET | Refills: 2 | Status: SHIPPED | OUTPATIENT
Start: 2022-05-03

## 2022-05-03 RX ADMIN — HYDROMORPHONE HYDROCHLORIDE 0.5 MG: 1 INJECTION, SOLUTION INTRAMUSCULAR; INTRAVENOUS; SUBCUTANEOUS at 14:50:00

## 2022-05-03 RX ADMIN — FLUCONAZOLE 200 MG: 2 INJECTION INTRAVENOUS at 14:32:00

## 2022-05-03 NOTE — TELEPHONE ENCOUNTER
Charimonserrat Leni called she is having vomiting and not able to hold down medications including her pain meds, vomiting immediately after taking but not sure if meds in vomit. Denies fevers, but has had chills, no sob or chest pain, nausea-had not taken her antiemetics-instructed to try to take one now. Having diarrhea x 4 in last 24 hours using Imodium or Lomotil, not sure if improving yet. Not eating much and trying to drink but drinking this am caused vomiting  Denies lightheadedness.  pm today in [place].

## 2022-05-03 NOTE — PATIENT INSTRUCTIONS
Change: Zofran from the pill to the dissolve tablet. Next dose at 10 pm tonight    Start: olanzapine 5 mg at bedtime. This will make it harder for your brain to feel nausea. Take 2 tabs of the donated medication. Next dose at 10 pm tonight    Continue: compazine every 6 hours as needed.    Next dose at 6 pm tonight      Morphine: next dose due at 4 am (should not be taken before 11:30 pm)    Try ACT dry mouth lozenges for bad taste in mouth    Restart: nystatin swish and spit    Next dose tomorrow morning

## 2022-05-03 NOTE — PROGRESS NOTES
Patient presents with:  Nausea/vomiting: APN assessment - sick call  Diarrhea    Pt is here for a sick call - nausea/vomiting/diarrhea. She was last treated on 04/26/2022 C3 D1 folfirinox. Unable to eat or drink due to side effects; weight is down 9 lbs since pump disconnect on Thurs 4/28/22. Persistent nausea with vomiting; has had 4+ diarrhea stools today - taking imodium and lomotil with some relief. Chills but no recorded temperature at home; afebrile in office. Pt appears unwell today.     Education Record    Learner:  Patient and Spouse    Disease / Diagnosis: pancreatic cancer    Barriers / Limitations:  None   Comments:    Method:  Brief focused   Comments:    General Topics:  Diet, Infection, Medication, Pain, Side effects and symptom management and Plan of care reviewed   Comments:    Outcome:  Shows understanding   Comments:

## 2022-05-03 NOTE — PROGRESS NOTES
Education Record    Learner:  Patient    Disease / Diagnosis: Acute care visit. Vomiting throughout the day and unable to keep pills/food/drink down. Positive for orthostatic BP. HR elevated. Patient had thrush on her tongue- IV diflucan ordered. Zofran/dex ordered. Fluids ordered. IV dilaudid ordered. At 1530, patient stated she feels much better than she did before she arrived. She was able to take her oral pain medicine at this time and said it was the \"easiest pill she has taken in days\". APN made aware of this.  Patient will return tomorrow for IVF for 1h.    Barriers / Limitations:  None   Comments:    Method:  Discussion   Comments:    General Topics:  Side effects and symptom management   Comments:    Outcome:  Shows understanding   Comments:

## 2022-05-04 ENCOUNTER — OFFICE VISIT (OUTPATIENT)
Dept: HEMATOLOGY/ONCOLOGY | Facility: HOSPITAL | Age: 72
End: 2022-05-04
Attending: INTERNAL MEDICINE
Payer: MEDICARE

## 2022-05-04 VITALS
SYSTOLIC BLOOD PRESSURE: 118 MMHG | HEART RATE: 90 BPM | TEMPERATURE: 98 F | OXYGEN SATURATION: 98 % | RESPIRATION RATE: 18 BRPM | DIASTOLIC BLOOD PRESSURE: 72 MMHG

## 2022-05-04 DIAGNOSIS — C25.7 MALIGNANT NEOPLASM OF OTHER PARTS OF PANCREAS (HCC): Primary | ICD-10-CM

## 2022-05-04 PROCEDURE — 96361 HYDRATE IV INFUSION ADD-ON: CPT

## 2022-05-04 PROCEDURE — 96360 HYDRATION IV INFUSION INIT: CPT

## 2022-05-06 ENCOUNTER — LAB ENCOUNTER (OUTPATIENT)
Dept: LAB | Facility: HOSPITAL | Age: 72
End: 2022-05-06
Attending: INTERNAL MEDICINE
Payer: MEDICARE

## 2022-05-06 ENCOUNTER — OFFICE VISIT (OUTPATIENT)
Dept: HEMATOLOGY/ONCOLOGY | Facility: HOSPITAL | Age: 72
End: 2022-05-06
Attending: INTERNAL MEDICINE
Payer: MEDICARE

## 2022-05-06 VITALS
HEART RATE: 78 BPM | TEMPERATURE: 98 F | OXYGEN SATURATION: 98 % | DIASTOLIC BLOOD PRESSURE: 82 MMHG | SYSTOLIC BLOOD PRESSURE: 134 MMHG | RESPIRATION RATE: 18 BRPM

## 2022-05-06 DIAGNOSIS — E86.0 DEHYDRATION: ICD-10-CM

## 2022-05-06 DIAGNOSIS — R19.7 DIARRHEA, UNSPECIFIED TYPE: Primary | ICD-10-CM

## 2022-05-06 DIAGNOSIS — C25.7 MALIGNANT NEOPLASM OF OTHER PARTS OF PANCREAS (HCC): Primary | ICD-10-CM

## 2022-05-06 DIAGNOSIS — C25.7 MALIGNANT NEOPLASM OF OTHER PARTS OF PANCREAS (HCC): ICD-10-CM

## 2022-05-06 LAB
ANION GAP SERPL CALC-SCNC: 6 MMOL/L (ref 0–18)
BUN BLD-MCNC: 18 MG/DL (ref 7–18)
CALCIUM BLD-MCNC: 8.2 MG/DL (ref 8.5–10.1)
CHLORIDE SERPL-SCNC: 113 MMOL/L (ref 98–112)
CO2 SERPL-SCNC: 22 MMOL/L (ref 21–32)
CREAT BLD-MCNC: 0.88 MG/DL
GLUCOSE BLD-MCNC: 83 MG/DL (ref 70–99)
MAGNESIUM SERPL-MCNC: 1.9 MG/DL (ref 1.6–2.6)
OSMOLALITY SERPL CALC.SUM OF ELEC: 293 MOSM/KG (ref 275–295)
POTASSIUM SERPL-SCNC: 3.5 MMOL/L (ref 3.5–5.1)
SODIUM SERPL-SCNC: 141 MMOL/L (ref 136–145)

## 2022-05-06 PROCEDURE — 96360 HYDRATION IV INFUSION INIT: CPT

## 2022-05-06 PROCEDURE — 99215 OFFICE O/P EST HI 40 MIN: CPT | Performed by: CLINICAL NURSE SPECIALIST

## 2022-05-06 PROCEDURE — 83735 ASSAY OF MAGNESIUM: CPT | Performed by: CLINICAL NURSE SPECIALIST

## 2022-05-06 PROCEDURE — 80048 BASIC METABOLIC PNL TOTAL CA: CPT | Performed by: CLINICAL NURSE SPECIALIST

## 2022-05-06 RX ORDER — CHOLESTYRAMINE LIGHT 4 G/5.7G
4 POWDER, FOR SUSPENSION ORAL 4 TIMES DAILY PRN
Qty: 30 PACKET | Refills: 1 | Status: SHIPPED | OUTPATIENT
Start: 2022-05-06

## 2022-05-06 NOTE — PROGRESS NOTES
Education Record    Learner:  Patient    Disease / Diagnosis: Pt here for IVF. Barriers / Limitations:  None    Method:  Brief focused, printed material and  reinforcement    General Topics:  Plan of care reviewed    Outcome:  Shows understanding    Pt reports worsening diarrhea over the past few days. Pt also reports fatigue. Pt states she has been trying to alternate anti-diarrheal medications with minimal relief. Pt requested to speak with NP about concerns. NP Indigo notified. Per order, labs were drawn during fluid administration. Pt instructed to bring in stool sample tomorrow. Unable to obtain stool sample from pt today. Pt was cleared by NP. Appts made for fluids for sat &sun. If pt feels better, she is able to cancel. Pt left in stable condition.

## 2022-05-07 ENCOUNTER — LAB ENCOUNTER (OUTPATIENT)
Dept: LAB | Facility: HOSPITAL | Age: 72
End: 2022-05-07
Attending: INTERNAL MEDICINE
Payer: MEDICARE

## 2022-05-07 ENCOUNTER — OFFICE VISIT (OUTPATIENT)
Dept: HEMATOLOGY/ONCOLOGY | Facility: HOSPITAL | Age: 72
End: 2022-05-07
Attending: INTERNAL MEDICINE
Payer: MEDICARE

## 2022-05-07 VITALS
RESPIRATION RATE: 18 BRPM | HEART RATE: 90 BPM | TEMPERATURE: 99 F | OXYGEN SATURATION: 98 % | DIASTOLIC BLOOD PRESSURE: 92 MMHG | SYSTOLIC BLOOD PRESSURE: 173 MMHG

## 2022-05-07 DIAGNOSIS — C25.7 MALIGNANT NEOPLASM OF OTHER PARTS OF PANCREAS (HCC): Primary | ICD-10-CM

## 2022-05-07 DIAGNOSIS — R19.7 DIARRHEA, UNSPECIFIED TYPE: ICD-10-CM

## 2022-05-07 PROCEDURE — 96360 HYDRATION IV INFUSION INIT: CPT

## 2022-05-07 PROCEDURE — 96366 THER/PROPH/DIAG IV INF ADDON: CPT

## 2022-05-07 PROCEDURE — 87493 C DIFF AMPLIFIED PROBE: CPT

## 2022-05-07 PROCEDURE — 87493 C DIFF AMPLIFIED PROBE: CPT | Performed by: CLINICAL NURSE SPECIALIST

## 2022-05-07 NOTE — PROGRESS NOTES
Education Record    Learner:  Patient    Disease / Diagnosis: Dehydration    Barriers / Limitations:  None   Comments:    Method:  Discussion   Comments:    General Topics:  Plan of care reviewed   Comments:    Outcome:  Shows understanding   Comments: Pt tolerated ivf well. Will continue to monitor.

## 2022-05-08 ENCOUNTER — OFFICE VISIT (OUTPATIENT)
Dept: HEMATOLOGY/ONCOLOGY | Facility: HOSPITAL | Age: 72
End: 2022-05-08
Attending: INTERNAL MEDICINE
Payer: MEDICARE

## 2022-05-08 VITALS
OXYGEN SATURATION: 96 % | TEMPERATURE: 98 F | DIASTOLIC BLOOD PRESSURE: 90 MMHG | HEART RATE: 104 BPM | SYSTOLIC BLOOD PRESSURE: 163 MMHG | RESPIRATION RATE: 18 BRPM

## 2022-05-08 DIAGNOSIS — C25.7 MALIGNANT NEOPLASM OF OTHER PARTS OF PANCREAS (HCC): Primary | ICD-10-CM

## 2022-05-08 LAB — C DIFF TOX B STL QL: NEGATIVE

## 2022-05-08 PROCEDURE — 96360 HYDRATION IV INFUSION INIT: CPT

## 2022-05-08 NOTE — PROGRESS NOTES
Education Record    Learner:  Patient    Disease / Diagnosis:IV fluids     Barriers / Limitations:  None   Comments:    Method:  Brief focused   Comments:    General Topics:  Infection, Medication, Pain, Precautions, Procedure, Side effects and symptom management, Plan of care reviewed and Fall risk and prevention   Comments:    Outcome:  Shows understanding   Comments:

## 2022-05-10 ENCOUNTER — SOCIAL WORK SERVICES (OUTPATIENT)
Dept: HEMATOLOGY/ONCOLOGY | Facility: HOSPITAL | Age: 72
End: 2022-05-10

## 2022-05-10 ENCOUNTER — OFFICE VISIT (OUTPATIENT)
Dept: HEMATOLOGY/ONCOLOGY | Facility: HOSPITAL | Age: 72
End: 2022-05-10
Attending: INTERNAL MEDICINE
Payer: MEDICARE

## 2022-05-10 VITALS
DIASTOLIC BLOOD PRESSURE: 80 MMHG | OXYGEN SATURATION: 96 % | HEART RATE: 112 BPM | HEIGHT: 58.43 IN | WEIGHT: 143.19 LBS | TEMPERATURE: 98 F | SYSTOLIC BLOOD PRESSURE: 150 MMHG | BODY MASS INDEX: 29.65 KG/M2 | RESPIRATION RATE: 16 BRPM

## 2022-05-10 DIAGNOSIS — C25.7 MALIGNANT NEOPLASM OF OTHER PARTS OF PANCREAS (HCC): Primary | ICD-10-CM

## 2022-05-10 DIAGNOSIS — E87.6 HYPOKALEMIA: ICD-10-CM

## 2022-05-10 DIAGNOSIS — K86.89 PANCREATIC MASS: ICD-10-CM

## 2022-05-10 LAB
ALBUMIN SERPL-MCNC: 3.2 G/DL (ref 3.4–5)
ALBUMIN/GLOB SERPL: 0.9 {RATIO} (ref 1–2)
ALP LIVER SERPL-CCNC: 109 U/L
ALT SERPL-CCNC: 30 U/L
ANION GAP SERPL CALC-SCNC: 6 MMOL/L (ref 0–18)
AST SERPL-CCNC: 21 U/L (ref 15–37)
BASOPHILS # BLD AUTO: 0.03 X10(3) UL (ref 0–0.2)
BASOPHILS NFR BLD AUTO: 0.2 %
BILIRUB SERPL-MCNC: 0.3 MG/DL (ref 0.1–2)
BUN BLD-MCNC: 7 MG/DL (ref 7–18)
CALCIUM BLD-MCNC: 8.7 MG/DL (ref 8.5–10.1)
CANCER AG19-9 SERPL-ACNC: ABNORMAL U/ML (ref ?–37)
CHLORIDE SERPL-SCNC: 108 MMOL/L (ref 98–112)
CO2 SERPL-SCNC: 26 MMOL/L (ref 21–32)
CREAT BLD-MCNC: 1.12 MG/DL
EOSINOPHIL # BLD AUTO: 0 X10(3) UL (ref 0–0.7)
EOSINOPHIL NFR BLD AUTO: 0 %
ERYTHROCYTE [DISTWIDTH] IN BLOOD BY AUTOMATED COUNT: 16.4 %
GLOBULIN PLAS-MCNC: 3.4 G/DL (ref 2.8–4.4)
GLUCOSE BLD-MCNC: 138 MG/DL (ref 70–99)
HCT VFR BLD AUTO: 28.5 %
HGB BLD-MCNC: 9.2 G/DL
IMM GRANULOCYTES # BLD AUTO: 0.3 X10(3) UL (ref 0–1)
IMM GRANULOCYTES NFR BLD: 2.5 %
LYMPHOCYTES # BLD AUTO: 0.9 X10(3) UL (ref 1–4)
LYMPHOCYTES NFR BLD AUTO: 7.5 %
MCH RBC QN AUTO: 31.3 PG (ref 26–34)
MCHC RBC AUTO-ENTMCNC: 32.3 G/DL (ref 31–37)
MCV RBC AUTO: 96.9 FL
MONOCYTES # BLD AUTO: 0.97 X10(3) UL (ref 0.1–1)
MONOCYTES NFR BLD AUTO: 8 %
NEUTROPHILS # BLD AUTO: 9.85 X10 (3) UL (ref 1.5–7.7)
NEUTROPHILS # BLD AUTO: 9.85 X10(3) UL (ref 1.5–7.7)
NEUTROPHILS NFR BLD AUTO: 81.8 %
OSMOLALITY SERPL CALC.SUM OF ELEC: 290 MOSM/KG (ref 275–295)
PLATELET # BLD AUTO: 139 10(3)UL (ref 150–450)
POTASSIUM SERPL-SCNC: 2.9 MMOL/L (ref 3.5–5.1)
PROT SERPL-MCNC: 6.6 G/DL (ref 6.4–8.2)
RBC # BLD AUTO: 2.94 X10(6)UL
SODIUM SERPL-SCNC: 140 MMOL/L (ref 136–145)
WBC # BLD AUTO: 12.1 X10(3) UL (ref 4–11)

## 2022-05-10 PROCEDURE — 96365 THER/PROPH/DIAG IV INF INIT: CPT

## 2022-05-10 PROCEDURE — 80053 COMPREHEN METABOLIC PANEL: CPT

## 2022-05-10 PROCEDURE — 99214 OFFICE O/P EST MOD 30 MIN: CPT | Performed by: INTERNAL MEDICINE

## 2022-05-10 PROCEDURE — 86301 IMMUNOASSAY TUMOR CA 19-9: CPT | Performed by: INTERNAL MEDICINE

## 2022-05-10 PROCEDURE — 96366 THER/PROPH/DIAG IV INF ADDON: CPT

## 2022-05-10 PROCEDURE — 85025 COMPLETE CBC W/AUTO DIFF WBC: CPT

## 2022-05-10 RX ORDER — POTASSIUM CHLORIDE 750 MG/1
10 TABLET, EXTENDED RELEASE ORAL DAILY
Qty: 30 TABLET | Refills: 3 | Status: SHIPPED | OUTPATIENT
Start: 2022-05-10

## 2022-05-10 RX ORDER — HYDROCODONE BITARTRATE AND ACETAMINOPHEN 10; 325 MG/1; MG/1
1-2 TABLET ORAL EVERY 4 HOURS PRN
Qty: 120 TABLET | Refills: 0 | Status: SHIPPED | OUTPATIENT
Start: 2022-05-10

## 2022-05-10 RX ORDER — MORPHINE SULFATE 15 MG/1
15 TABLET, FILM COATED, EXTENDED RELEASE ORAL EVERY 12 HOURS SCHEDULED
Qty: 60 TABLET | Refills: 0 | Status: SHIPPED | OUTPATIENT
Start: 2022-05-10 | End: 2022-06-09

## 2022-05-10 RX ORDER — POTASSIUM CHLORIDE 750 MG/1
20 TABLET, EXTENDED RELEASE ORAL ONCE
Status: CANCELLED
Start: 2022-05-10 | End: 2022-05-10

## 2022-05-10 RX ORDER — FLUCONAZOLE 100 MG/1
TABLET ORAL
Qty: 8 TABLET | Refills: 0 | Status: SHIPPED | OUTPATIENT
Start: 2022-05-10

## 2022-05-10 RX ORDER — POTASSIUM CHLORIDE 20 MEQ/1
20 TABLET, EXTENDED RELEASE ORAL ONCE
Status: COMPLETED | OUTPATIENT
Start: 2022-05-10 | End: 2022-05-10

## 2022-05-10 RX ADMIN — POTASSIUM CHLORIDE 20 MEQ: 20 TABLET, EXTENDED RELEASE ORAL at 13:20:00

## 2022-05-10 NOTE — PROGRESS NOTES
Education Record    Learner:  Patient    Disease / Diagnosis: NO treatment today. Critical potassium of 2.9. Patient will get IVF with potassium today. She will return in 1 week for IVF and return in 2 weeks for labs, MD, possible chemo.      Barriers / Limitations:  None   Comments:    Method:  Discussion   Comments:    General Topics:  Plan of care reviewed   Comments:    Outcome:  Shows understanding   Comments:

## 2022-05-10 NOTE — PROGRESS NOTES
Outpatient Oncology Care Plan   Problem list:   knowledge deficit   Problems related to:   self care   Interventions:   provided general teaching   Expected outcomes:   understands plan of care   Progress towards outcome: making progress     Education Record   Learner: Patient   Barriers / Limitations: None   Method: Discussion   Outcome: Shows understanding   Comments:  Patient here for follow-up and planned C4D1 treatment. States she has felt poorly this past week. She has no energy or appetite. Pain is becoming more severe and not well managed with norco and morphine at this point. She has nausea regularly as well as headaches. She states she wishes to not pursue treatment today.

## 2022-05-10 NOTE — PROGRESS NOTES
met with patient to provide support and information on Hospice Care. Patient shared that she is having more bad days than good, and will be getting a CT Scan to see if things have progressed or not. Patient is very realistic with her prognosis and is prepared for end of life comfort care. She shared how she is still emotional about everything, and has taken the last couple of days to herself to come to terms. She has a very supportive family, especially in her . She shared her concerns for his well being after she passes, sharing the things she has done to prepare him for life with out her. Support and encouragement provided. Patient asked about Hospice Care; provided in depth explanation of services, equipment, support, and service area, answering all questions. Patient stated that she feels more reassured after the conversation, and is more comfortable with the decision when it is time for that.  continued to provide support and company while patient received fluids, and appeared to be in an uplifting and positive mood. She is aware to reach out to  at any time.

## 2022-05-17 ENCOUNTER — OFFICE VISIT (OUTPATIENT)
Dept: HEMATOLOGY/ONCOLOGY | Facility: HOSPITAL | Age: 72
End: 2022-05-17
Attending: INTERNAL MEDICINE
Payer: MEDICARE

## 2022-05-17 VITALS
BODY MASS INDEX: 29.9 KG/M2 | TEMPERATURE: 98 F | WEIGHT: 144.38 LBS | DIASTOLIC BLOOD PRESSURE: 79 MMHG | RESPIRATION RATE: 16 BRPM | OXYGEN SATURATION: 95 % | SYSTOLIC BLOOD PRESSURE: 172 MMHG | HEIGHT: 58.43 IN

## 2022-05-17 DIAGNOSIS — C25.7 MALIGNANT NEOPLASM OF OTHER PARTS OF PANCREAS (HCC): ICD-10-CM

## 2022-05-17 DIAGNOSIS — E87.6 HYPOKALEMIA: Primary | ICD-10-CM

## 2022-05-17 PROCEDURE — 96360 HYDRATION IV INFUSION INIT: CPT

## 2022-05-17 PROCEDURE — 96361 HYDRATE IV INFUSION ADD-ON: CPT

## 2022-05-17 NOTE — PROGRESS NOTES
Education Record    Learner:  Patient    Disease / Diagnosis: IV Fluids. Pt is feeling much better.     Barriers / Limitations:  None   Comments:    Method:  Discussion   Comments:    General Topics:  Plan of care reviewed   Comments:    Outcome:  Shows understanding   Comments:

## 2022-05-23 ENCOUNTER — HOSPITAL ENCOUNTER (OUTPATIENT)
Dept: CT IMAGING | Facility: HOSPITAL | Age: 72
Discharge: HOME | End: 2022-05-23
Attending: INTERNAL MEDICINE
Payer: MEDICARE

## 2022-05-23 DIAGNOSIS — C25.7 MALIGNANT NEOPLASM OF OTHER PARTS OF PANCREAS (HCC): ICD-10-CM

## 2022-05-23 PROCEDURE — 74177 CT ABD & PELVIS W/CONTRAST: CPT | Performed by: INTERNAL MEDICINE

## 2022-05-23 PROCEDURE — 71260 CT THORAX DX C+: CPT | Performed by: INTERNAL MEDICINE

## 2022-05-24 ENCOUNTER — OFFICE VISIT (OUTPATIENT)
Dept: HEMATOLOGY/ONCOLOGY | Facility: HOSPITAL | Age: 72
End: 2022-05-24
Attending: INTERNAL MEDICINE
Payer: MEDICARE

## 2022-05-24 ENCOUNTER — SOCIAL WORK SERVICES (OUTPATIENT)
Dept: HEMATOLOGY/ONCOLOGY | Facility: HOSPITAL | Age: 72
End: 2022-05-24

## 2022-05-24 VITALS
BODY MASS INDEX: 28.33 KG/M2 | DIASTOLIC BLOOD PRESSURE: 90 MMHG | TEMPERATURE: 97 F | WEIGHT: 136.81 LBS | RESPIRATION RATE: 18 BRPM | SYSTOLIC BLOOD PRESSURE: 171 MMHG | HEART RATE: 97 BPM | OXYGEN SATURATION: 95 % | HEIGHT: 58.43 IN

## 2022-05-24 DIAGNOSIS — C25.7 MALIGNANT NEOPLASM OF OTHER PARTS OF PANCREAS (HCC): Primary | ICD-10-CM

## 2022-05-24 LAB
ALBUMIN SERPL-MCNC: 3.1 G/DL (ref 3.4–5)
ALBUMIN/GLOB SERPL: 0.8 {RATIO} (ref 1–2)
ALP LIVER SERPL-CCNC: 89 U/L
ALT SERPL-CCNC: 17 U/L
ANION GAP SERPL CALC-SCNC: 9 MMOL/L (ref 0–18)
AST SERPL-CCNC: 16 U/L (ref 15–37)
BASOPHILS # BLD AUTO: 0.08 X10(3) UL (ref 0–0.2)
BASOPHILS NFR BLD AUTO: 1 %
BILIRUB SERPL-MCNC: 0.3 MG/DL (ref 0.1–2)
BUN BLD-MCNC: 10 MG/DL (ref 7–18)
CALCIUM BLD-MCNC: 9.3 MG/DL (ref 8.5–10.1)
CANCER AG19-9 SERPL-ACNC: ABNORMAL U/ML (ref ?–37)
CHLORIDE SERPL-SCNC: 102 MMOL/L (ref 98–112)
CO2 SERPL-SCNC: 28 MMOL/L (ref 21–32)
CREAT BLD-MCNC: 0.96 MG/DL
EOSINOPHIL # BLD AUTO: 0.15 X10(3) UL (ref 0–0.7)
EOSINOPHIL NFR BLD AUTO: 1.9 %
ERYTHROCYTE [DISTWIDTH] IN BLOOD BY AUTOMATED COUNT: 16.2 %
FASTING STATUS PATIENT QL REPORTED: NO
GLOBULIN PLAS-MCNC: 4.1 G/DL (ref 2.8–4.4)
GLUCOSE BLD-MCNC: 122 MG/DL (ref 70–99)
HCT VFR BLD AUTO: 30.9 %
HGB BLD-MCNC: 9.4 G/DL
IMM GRANULOCYTES # BLD AUTO: 0.04 X10(3) UL (ref 0–1)
IMM GRANULOCYTES NFR BLD: 0.5 %
LYMPHOCYTES # BLD AUTO: 1.8 X10(3) UL (ref 1–4)
LYMPHOCYTES NFR BLD AUTO: 23 %
MCH RBC QN AUTO: 30.1 PG (ref 26–34)
MCHC RBC AUTO-ENTMCNC: 30.4 G/DL (ref 31–37)
MCV RBC AUTO: 99 FL
MONOCYTES # BLD AUTO: 1.14 X10(3) UL (ref 0.1–1)
MONOCYTES NFR BLD AUTO: 14.6 %
NEUTROPHILS # BLD AUTO: 4.6 X10 (3) UL (ref 1.5–7.7)
NEUTROPHILS # BLD AUTO: 4.6 X10(3) UL (ref 1.5–7.7)
NEUTROPHILS NFR BLD AUTO: 59 %
OSMOLALITY SERPL CALC.SUM OF ELEC: 288 MOSM/KG (ref 275–295)
PLATELET # BLD AUTO: 298 10(3)UL (ref 150–450)
POTASSIUM SERPL-SCNC: 3.4 MMOL/L (ref 3.5–5.1)
PROT SERPL-MCNC: 7.2 G/DL (ref 6.4–8.2)
RBC # BLD AUTO: 3.12 X10(6)UL
SODIUM SERPL-SCNC: 139 MMOL/L (ref 136–145)
WBC # BLD AUTO: 7.8 X10(3) UL (ref 4–11)

## 2022-05-24 PROCEDURE — 36591 DRAW BLOOD OFF VENOUS DEVICE: CPT

## 2022-05-24 PROCEDURE — 80053 COMPREHEN METABOLIC PANEL: CPT

## 2022-05-24 PROCEDURE — 86301 IMMUNOASSAY TUMOR CA 19-9: CPT

## 2022-05-24 PROCEDURE — 85025 COMPLETE CBC W/AUTO DIFF WBC: CPT

## 2022-05-24 NOTE — PROGRESS NOTES
met with patient,  Tiff Mathews, and Daughter Cassy Canela in exam room. Patient has decided to move forward with Hospice Care at this time. Provided support and encouragement, as well as education for family. Patient has been prepared for decision for awhile and is more concerned for her family to have proper support. Agreed for referral to be sent to [facility] (J#[phone] F#[phone]); Yvonne Pack made aware and will contact daughter Cassy Canela today to coordinate services.  will remain available.

## 2022-05-24 NOTE — PROGRESS NOTES
Education Record    Learner:  Patient    Disease / Diagnosis: Pancreatic Cancer    Barriers / Limitations:  None   Comments:    Method:  Brief focused   Comments:    General Topics:  Plan of care reviewed   Comments:    Outcome:  Shows understanding   Comments:

## 2022-05-24 NOTE — PROGRESS NOTES
Pt here for Md visit and chemo treatment. Pt states due to her pain, she took 2 norco last night. Pt states she is sleeping ok and only eats because she has too. Pt want to change some of her medication to luquid do to swallowing issues.

## 2023-01-26 NOTE — TELEPHONE ENCOUNTER
Future appt: Your appointments     Date & Time Appointment Department [facility])    Apr 02, 2019 10:15 AM CDT Laboratory Visit with [facility] ([facility])        [facility]  [facility]  [address] [address] [address]  [phone]         Last Appointment:  4/10/2018; Return in 1 year (on 4/10/2019). Cholesterol, Total (mg/dL)   Date Value   04/10/2018 204 (H)     HDL Cholesterol (mg/dL)   Date Value   04/10/2018 60     LDL Cholesterol (mg/dL)   Date Value   04/10/2018 115     Triglycerides (mg/dL)   Date Value   04/10/2018 144     No results found for: EAG, A1C  Lab Results   Component Value Date    TSH 1.880 04/10/2018     Last RF:  11/20/2017    No Follow-up on file. Alida

## (undated) DIAGNOSIS — K86.89 PANCREATIC MASS: ICD-10-CM

## (undated) DIAGNOSIS — R11.0 NAUSEA: ICD-10-CM

## (undated) DIAGNOSIS — C25.9 MALIGNANT NEOPLASM OF PANCREAS, UNSPECIFIED LOCATION OF MALIGNANCY (HCC): Primary | ICD-10-CM

## (undated) NOTE — MR AVS SNAPSHOT
[address]  [place] [address] [id number]  [phone]               Thank you for choosing us for your health care visit with Margie Saunders MD.  We are glad to serve you and happy to provide you with this summary of Now link in the [employer]. Enter your [employer] Activation Code exactly as it appears below along with your Zip Code and Date of Birth to complete the sign-up process. If you do not sign up before the expiration date, you must request a new code.     Elias Victor priority on exercise in your life                    Visit [facility] online at  [email or web address]

## (undated) NOTE — MR AVS SNAPSHOT
[address]  [place] [address] [id number]  [phone]               Thank you for choosing us for your health care visit with Geoff Chaves MD.  We are glad to serve you and happy to provide you with this summary of Phone:  [phone]    - febuxostat 40 MG Tabs            MyChart     Visit [employer]  You can access your MyChart to more actively manage your health care and view more details from this visit by going to [email or web address].   If you've recently

## (undated) NOTE — MR AVS SNAPSHOT
[address]  [place] [id number]  [phone]               Thank you for choosing us for your health care visit with Ana Arriaza MD.  We are glad to serve you and happy to provide you with this summary of Take 1 tablet by mouth daily.            Levothyroxine Sodium 50 MCG Tabs   Take 1 tablet by mouth every morning before breakfast.   Commonly known as:  SYNTHROID                   MyChart     Visit [employer]  You can access your MyChart to more actively [facility] [place]

## (undated) NOTE — LETTER
Printed: 2021    Patient Name: Latosha Hernandez  : 1950   Medical Record #: [id number]    Consent to [address], understand that I have been diagnosed with pancreatic cancer.     I understand that the mehdi satisfaction. I understand that I can contact my oncologist or my Cancer Care Team at any time if I have questions, by calling [phone]. Additional written information will be given to me prior to start of therapy.     Additionally, I will receive a